# Patient Record
Sex: MALE | Race: BLACK OR AFRICAN AMERICAN | NOT HISPANIC OR LATINO | Employment: FULL TIME | URBAN - METROPOLITAN AREA
[De-identification: names, ages, dates, MRNs, and addresses within clinical notes are randomized per-mention and may not be internally consistent; named-entity substitution may affect disease eponyms.]

---

## 2017-04-11 ENCOUNTER — OFFICE VISIT (OUTPATIENT)
Dept: SLEEP MEDICINE | Facility: CLINIC | Age: 37
End: 2017-04-11
Payer: COMMERCIAL

## 2017-04-11 VITALS
HEART RATE: 71 BPM | WEIGHT: 315 LBS | SYSTOLIC BLOOD PRESSURE: 154 MMHG | HEIGHT: 69 IN | DIASTOLIC BLOOD PRESSURE: 94 MMHG | BODY MASS INDEX: 46.65 KG/M2

## 2017-04-11 DIAGNOSIS — G47.30 SLEEP APNEA, UNSPECIFIED TYPE: Primary | ICD-10-CM

## 2017-04-11 PROCEDURE — 99204 OFFICE O/P NEW MOD 45 MIN: CPT | Mod: S$GLB,,, | Performed by: PSYCHIATRY & NEUROLOGY

## 2017-04-11 PROCEDURE — 99999 PR PBB SHADOW E&M-NEW PATIENT-LVL III: CPT | Mod: PBBFAC,,, | Performed by: PSYCHIATRY & NEUROLOGY

## 2017-04-11 RX ORDER — OLMESARTAN MEDOXOMIL 40 MG/1
TABLET ORAL
COMMUNITY
Start: 2017-04-04 | End: 2017-10-09 | Stop reason: ALTCHOICE

## 2017-04-11 NOTE — MR AVS SNAPSHOT
"    [facility]  [address]  [facility] [address]  Phone: [phone]                  Nain Jain   2017 9:20 AM   Office Visit    Description:  Male : 1980   Provider:  Sandra Olivia MD   Department:  [facility]           Reason for Visit     Sleep Apnea     Snoring           Diagnoses this Visit        Comments    Sleep apnea, unspecified type    -  Primary            To Do List           Goals (5 Years of Data)     None      [facility]     [facility] Care [facility] -  Assistance  Unless otherwise directed by your provider, please contact [facility], our nurse care line that is available for  assistance.     Registered nurses in the [facility] provide: appointment scheduling, clinical advisement, health education, and other advisory services.  Call: [phone] (toll free)               Medications           Message regarding Medications     Verify the changes and/or additions to your medication regime listed below are the same as discussed with your clinician today.  If any of these changes or additions are incorrect, please notify your healthcare provider.             Verify that the below list of medications is an accurate representation of the medications you are currently taking.  If none reported, the list may be blank. If incorrect, please contact your healthcare provider. Carry this list with you in case of emergency.           Current Medications     olmesartan (BENICAR) 40 MG tablet            Clinical Reference Information           Your Vitals Were     BP Pulse Height Weight BMI    154/94 (BP Location: Left arm, Patient Position: Sitting, BP Method: Automatic) 71 5' 9" (1.753 m) 158.9 kg (350 lb 5 oz) 51.73 kg/m2      Blood Pressure          Most Recent Value    BP  (!)  154/94      Allergies as of 2017     No Known Allergies      Immunizations Administered on Date of Encounter - 2017  "    None      Orders Placed During Today's Visit     Future Labs/Procedures Expected by Expires    Home Sleep Studies  As directed 4/11/2018      [facility] Sign-Up     Activating your [facility] account is as easy as 1-2-3!     1) Visit [email or web address], select Sign Up Now, enter this activation code and your date of birth, then select Next.  [email or web address]  Expires: 5/26/2017 10:39 AM      2) Create a username and password to use when you visit [facility] in the future and select a security question in case you lose your password and select Next.    3) Enter your e-mail address and click Sign Up!    Additional Information  If you have questions, please e-mail [email or web address] or call [phone] to talk to our [facility] staff. Remember, [facility] is NOT to be used for urgent needs. For medical emergencies, dial 911.         [place]    [facility] ([facility]) will contact you to schedule HOME the sleep study. Their number is [phone] (ext 1). Please call them if you do not hear from them in 10 business days from now.  [facility] is located on 7th floor of the [facility].    SLEEP CLINIC (my assistant) will call you when the sleep study results are ready - if you have not heard from us by 2 weeks from the date of the study, please call [phone] (ext 2) or you can use [facility] to contact me.    You are advised to abstain from driving should you feel sleepy or drowsy.         Language Assistance Services     ATTENTION: Language assistance services are available, free of charge. Please call [phone].      ATENCIÓN: Si habla español, tiene a johansen disposición servicios gratuitos de asistencia lingüística. Llame al [phone].     CHÚ Ý: N?u b?n nói Ti?ng Vi?t, có các d?ch v? h? tr? ngôn ng? mi?n phí dành cho b?n. G?i s? [phone].         [facility] complies with applicable Federal civil rights laws and does not discriminate on the basis of race, color,  national origin, age, disability, or sex.

## 2017-04-11 NOTE — PROGRESS NOTES
Nain Jain  was seen at the request of  Self, Aaareferral for sleep evaluation.    04/11/2017:  INITIAL HISTORY OF PRESENT ILLNESS:  Nain Jain is a 36 y.o. male is here to be evaluated for a sleep disorder.       CHIEF COMPLAINT:      The patient's complaints include snoring and interrupted sleep. Denied fatigue, sleepiness, witnessed apneas.    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    Denies  dry mouth and sore throat  Denies nasal congestion   Denies  morning headaches  Denies  interrupted sleep  Denies frequent leg movements  Denies symptoms concerning for parasomnia    The ESS ([place] Sleepiness Score) taken on initial visit is 1 /24    The patient never had tonsillectomy, adenoidectomy or UPPP      SLEEP ROUTINE AND LIFESTYLE 04/11/2017 :    Occupation:    Bed partner:      Time to bed - wake up time on a workday : 1 AM to 8 AM  Time to bed - wake up time on a day off: 11pm to  8AM  Sleep onset latency: 30 min  Disruptions or awakenings: 1  Time to fall back into sleep: 10 min   Perceived sleep quality: 4/5  Perceived total sleep time:  7  hours.  Daytime naps: 0    Exercise routine: sometimes       PREVIOUS SLEEP STUDIES:   None      DME:       PAST MEDICAL HISTORY:    Active Ambulatory Problems     Diagnosis Date Noted    No Active Ambulatory Problems     Resolved Ambulatory Problems     Diagnosis Date Noted    No Resolved Ambulatory Problems     No Additional Past Medical History                PAST SURGICAL HISTORY:    No past surgical history on file.      FAMILY HISTORY:                No family history on file.    SOCIAL HISTORY:          Tobacco:   History   Smoking Status    Never Smoker   Smokeless Tobacco    Not on file       alcohol use:    History   Alcohol use Not on file                   ALLERGIES:  Review of patient's allergies indicates:  No Known Allergies    CURRENT MEDICATIONS:    Current Outpatient Prescriptions   Medication  "Sig Dispense Refill    olmesartan (BENICAR) 40 MG tablet        No current facility-administered medications for this visit.                       REVIEW OF SYSTEMS:   Sleep related symptoms as per HPI    denies weight gain  Denies dyspnea  Denies palpitations  Denies acid reflux   Reports polyuria  Denies  mood diturbance  Denies  anemia  Denies  muscle pain  Denies  Gait imbalance    Otherwise, a balance of 10 systems reviewed is negative.    PHYSICAL EXAM:  BP (!) 154/94 (BP Location: Left arm, Patient Position: Sitting, BP Method: Automatic)  Pulse 71  Ht 5' 9" (1.753 m)  Wt (!) 158.9 kg (350 lb 5 oz)  BMI 51.73 kg/m2  GENERAL: Normal development, well groomed.  HEENT:   HEENT:  Conjunctivae are non-erythematous; Pupils equal, round, and reactive to light; Nose is symmetrical; Nasal mucosa is pink and moist; Septum is midline; Inferior turbinates are hypertrophied; Nasal airflow is diminished on the right; Posterior pharynx is pink; Modified Mallampati:IV; Posterior palate is very low; Tonsils +2 - +3; Uvula is wide and elongated;Tongue is enlarged; Dentition is fair; No TMJ tenderness; Jaw opening and protrusion without click and without discomfort.  NECK: Supple. Neck circumference is 20 inches. No thyromegaly. No palpable nodes.     SKIN: On face and neck: No abrasions, no rashes, no lesions.  No subcutaneous nodules are palpable.  RESPIRATORY: Chest is clear to auscultation.  Normal chest expansion and non-labored breathing at rest.  CARDIOVASCULAR: Normal S1, S2.  No murmurs, gallops or rubs. No carotid bruits bilaterally.  No edema. No clubbing. No cyanosis.    NEURO: Oriented to time, place and person. Normal attention span and concentration. Gait normal.    PSYCH: Affect is full. Mood is normal  MUSCULOSKELETAL: Moves 4 extremities. Gait normal.         Using [facility]:      ASSESSMENT:    1. Sleep Apnea NEC. The patient symptomatically has  snoring and interrupted sleep  with exam findings of "a " crowded oral airway and elevated body mass index. The patient has medical co-morbidities of elevated blood pressure,  which can be worsened by WALTER. This warrants further investigation for possible obstructive sleep apnea. Scored 5/8 on STOP BANG scale.          PLAN:    Diagnostic: Polysomnogram HOME (BCBS).        More than 25 minutes of this 45 minutes visit was spent in counseling: during our discussion today, we talked about the etiology of  WALTER as well as the potential ramifications of untreated sleep apnea, which could include daytime sleepiness, hypertension, heart disease and/or stroke.  We discussed potential treatment options, which could include weight loss, body positioning, continuous positive airway pressure (CPAP), or referral for surgical consideration. Meanwhile, he  is urged to avoid supine sleep, weight gain and alcoholic beverages since all of these can worsen WALTER.     Precautions: The patient was advised to abstain from driving should he feel sleepy or drowsy.    Follow up: MD/NP  after the sleep study has been completed.     Thank you for allowing me the opportunity to participate in the care of your patient.    This visit summary will be sent to referring provider via inbasket

## 2017-04-11 NOTE — PATIENT INSTRUCTIONS
SLEEP LAB ([facility] or [facility]) will contact you to schedule HOME the sleep study. Their number is [phone] (ext 1). Please call them if you do not hear from them in 10 business days from now.  [facility] is located on 7th floor of the [facility].    SLEEP CLINIC (my assistant) will call you when the sleep study results are ready - if you have not heard from us by 2 weeks from the date of the study, please call [phone] (ext 2) or you can use [facility] to contact me.    You are advised to abstain from driving should you feel sleepy or drowsy.

## 2017-04-13 ENCOUNTER — TELEPHONE (OUTPATIENT)
Dept: SLEEP MEDICINE | Facility: OTHER | Age: 37
End: 2017-04-13

## 2017-04-18 ENCOUNTER — DOCUMENTATION ONLY (OUTPATIENT)
Dept: BARIATRICS | Facility: CLINIC | Age: 37
End: 2017-04-18

## 2017-04-18 NOTE — PROGRESS NOTES
Bariatric Surgery Online Course Form Submission  Someone has submitted a Bariatric Surgery Online Course Form Submission on this page.  Date: 2017-04-17 - 14:28  Patient's Name: Nain Jain  YOB: 1980 Email: [email or web address]  Phone: [phone]   Patient Address: [address] [place] [id number]  Preferred Surgical Location: [facility]   I certify that I am 18 years of age or older:   Confirmation Code: Inspire [id number]  Verification of Bariatric Seminar: y  Insurance Information  Insurance or Self Pay? Insurance - Fill out fields below  Insurance Company Name: Blue Cross Blue Shield   Type of Coverage/Coverage Plan (i.e. PPO, HMO): PPO   Group Name:   Subscriber Name: Nain Jain   Member Name (patient's name)   Member ID/Policy #:[id number]   Plan Effective Date: 01/24/2016  Card Issuance date:

## 2017-04-20 ENCOUNTER — HOSPITAL ENCOUNTER (OUTPATIENT)
Dept: CARDIOLOGY | Facility: CLINIC | Age: 37
Discharge: HOME | End: 2017-04-20
Attending: SURGERY
Payer: COMMERCIAL

## 2017-04-20 ENCOUNTER — DOCUMENTATION ONLY (OUTPATIENT)
Dept: BARIATRICS | Facility: CLINIC | Age: 37
End: 2017-04-20

## 2017-04-20 ENCOUNTER — HOSPITAL ENCOUNTER (OUTPATIENT)
Dept: RADIOLOGY | Facility: HOSPITAL | Age: 37
Discharge: HOME | End: 2017-04-20
Attending: SURGERY
Payer: COMMERCIAL

## 2017-04-20 ENCOUNTER — INITIAL CONSULT (OUTPATIENT)
Dept: BARIATRICS | Facility: CLINIC | Age: 37
End: 2017-04-20
Payer: COMMERCIAL

## 2017-04-20 VITALS
HEIGHT: 70 IN | DIASTOLIC BLOOD PRESSURE: 90 MMHG | BODY MASS INDEX: 45.1 KG/M2 | SYSTOLIC BLOOD PRESSURE: 140 MMHG | WEIGHT: 315 LBS | HEART RATE: 78 BPM

## 2017-04-20 DIAGNOSIS — E66.9 DIABETES MELLITUS TYPE 2 IN OBESE: ICD-10-CM

## 2017-04-20 DIAGNOSIS — E66.01 MORBID OBESITY WITH BMI OF 50.0-59.9, ADULT: ICD-10-CM

## 2017-04-20 DIAGNOSIS — R06.02 SOB (SHORTNESS OF BREATH): ICD-10-CM

## 2017-04-20 DIAGNOSIS — I10 ESSENTIAL HYPERTENSION: ICD-10-CM

## 2017-04-20 DIAGNOSIS — E78.5 HYPERLIPIDEMIA, UNSPECIFIED HYPERLIPIDEMIA TYPE: ICD-10-CM

## 2017-04-20 DIAGNOSIS — E11.69 DIABETES MELLITUS TYPE 2 IN OBESE: ICD-10-CM

## 2017-04-20 DIAGNOSIS — E66.01 MORBID OBESITY WITH BMI OF 50.0-59.9, ADULT: Primary | ICD-10-CM

## 2017-04-20 PROCEDURE — 3080F DIAST BP >= 90 MM HG: CPT | Mod: S$GLB,,, | Performed by: PHYSICIAN ASSISTANT

## 2017-04-20 PROCEDURE — 4010F ACE/ARB THERAPY RXD/TAKEN: CPT | Mod: S$GLB,,, | Performed by: PHYSICIAN ASSISTANT

## 2017-04-20 PROCEDURE — 99999 PR PBB SHADOW E&M-EST. PATIENT-LVL III: CPT | Mod: PBBFAC,,, | Performed by: PHYSICIAN ASSISTANT

## 2017-04-20 PROCEDURE — 3046F HEMOGLOBIN A1C LEVEL >9.0%: CPT | Mod: 8P,S$GLB,, | Performed by: PHYSICIAN ASSISTANT

## 2017-04-20 PROCEDURE — 1160F RVW MEDS BY RX/DR IN RCRD: CPT | Mod: S$GLB,,, | Performed by: PHYSICIAN ASSISTANT

## 2017-04-20 PROCEDURE — 99205 OFFICE O/P NEW HI 60 MIN: CPT | Mod: S$GLB,,, | Performed by: PHYSICIAN ASSISTANT

## 2017-04-20 PROCEDURE — 93000 ELECTROCARDIOGRAM COMPLETE: CPT | Mod: S$GLB,,, | Performed by: INTERNAL MEDICINE

## 2017-04-20 PROCEDURE — 71020 XR CHEST PA AND LATERAL: CPT | Mod: 26,,, | Performed by: RADIOLOGY

## 2017-04-20 PROCEDURE — 71020 XR CHEST PA AND LATERAL: CPT | Mod: TC

## 2017-04-20 PROCEDURE — 3077F SYST BP >= 140 MM HG: CPT | Mod: S$GLB,,, | Performed by: PHYSICIAN ASSISTANT

## 2017-04-20 RX ORDER — METFORMIN HYDROCHLORIDE EXTENDED-RELEASE TABLETS 500 MG/1
500 TABLET, FILM COATED, EXTENDED RELEASE ORAL
COMMUNITY
End: 2018-01-31 | Stop reason: SDUPTHER

## 2017-04-20 RX ORDER — ROSUVASTATIN CALCIUM 40 MG/1
10 TABLET, COATED ORAL NIGHTLY
COMMUNITY
End: 2018-01-31 | Stop reason: SDUPTHER

## 2017-04-20 NOTE — PROGRESS NOTES
BARIATRIC NEW PATIENT EVALUATION    CHIEF COMPLAINT:   Morbid obesity, Body mass index is 50.36 kg/(m^2). and inability to lose weight.    HPI:  Nain Jani is a 36 y.o. male presenting for morbid obesity, Body mass index is 50.36 kg/(m^2). and inability to lose weight. The patient has tried diet modification and juicing.  The most weight lost was 10 lbs with diet modification.  He has been overweight since his late 20's after a divorce.  His highest weight is his current weight, 350 lbs.  He has already cut out fast food and beer.  He is not able to be as active as he wants.  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  He eats very large portionS.  He wants a Sleeve Gastrectomy with Dr. Massey.    PAST MEDICAL HISTORY:  Past Medical History:   Diagnosis Date    Diabetes mellitus     Hyperlipidemia     Hypertension          PAST SURGICAL HISTORY:  History reviewed. No pertinent surgical history.    FAMILY HISTORY:  Family History   Problem Relation Age of Onset    Diabetes Mother     Obesity Mother     Diabetes Maternal Grandmother           SOCIAL HISTORY:   reports that he has never smoked. He has never used smokeless tobacco. He reports that he drinks about 1.2 oz of alcohol per week  He reports that he does not use illicit drugs.  He works full time as a  for [employer].  He is  with 6 kids and 1 grandchild.      MEDICATIONS:  Medications have been reviewed.    ALLERGIES:  Allergies have been reviewed.    Review of Systems   Constitutional: Negative for chills, fever and malaise/fatigue.   Eyes: Negative for blurred vision and double vision.   Respiratory: Positive for shortness of breath (every once in a while with extra exertion, can do 3 flights of stairs without sob). Negative for cough and hemoptysis.    Cardiovascular: Positive for leg swelling. Negative for chest pain and palpitations.   Gastrointestinal: Positive for blood in stool  (hemrrhoids, constipation). Negative for abdominal pain, constipation, diarrhea, heartburn, melena, nausea and vomiting.   Genitourinary: Negative for dysuria and hematuria.   Musculoskeletal: Negative for back pain, falls, joint pain, myalgias and neck pain.   Skin: Negative for rash.   Neurological: Negative for dizziness, tingling, weakness and headaches.   Endo/Heme/Allergies: Negative for environmental allergies. Does not bruise/bleed easily.   Psychiatric/Behavioral: Negative for depression, hallucinations, memory loss, substance abuse and suicidal ideas. The patient is not nervous/anxious and does not have insomnia.        Vitals:    04/20/17 1352   BP: (!) 140/90   Pulse: 78       Physical Exam   Constitutional: He is oriented to person, place, and time and well-developed, well-nourished, and in no distress.   Morbidly obese   HENT:   Head: Normocephalic and atraumatic.   Eyes: No scleral icterus.   Cardiovascular: Normal rate, regular rhythm, normal heart sounds and intact distal pulses.  Exam reveals no gallop and no friction rub.    No murmur heard.  Pulmonary/Chest: Effort normal and breath sounds normal. No respiratory distress. He has no wheezes. He has no rales. He exhibits no tenderness.   Abdominal: Soft. Bowel sounds are normal. He exhibits no distension and no mass. There is no tenderness. There is no rebound and no guarding.   Musculoskeletal: He exhibits no edema.   Neurological: He is alert and oriented to person, place, and time.   Skin: Skin is warm and dry. No rash noted. No erythema. No pallor.   Psychiatric: Mood, memory, affect and judgment normal.   Nursing note and vitals reviewed.      DIAGNOSIS:  1. Morbid Obesity, Body mass index is 50.36 kg/(m^2). and inability to lose weight.  2. Co-morbidities: diabetes mellitus, dyslipidemia, hypertension and obstructive sleep apnea    PLAN:  The patient is a good candidate for Bariatric Surgery. The patient is interested in sleeve gastrectomy.  The surgery and post-op care was discussed in detail with the patient. All questions were answered.    The patient understands that bariatric surgery is a tool to aid in weight loss and that they need to be committed to the diet and exercise post-operatively for successful weight loss. Discussed with patient that bariatric surgery is not the easy way out and that it will take plenty of dedication on the patient's part to be successful. Also discussed the possibility of weight regain if the patient strays from the diet guidelines or exercise requirements. Patient verbalized understanding and wishes to proceed with the work-up.    Estimated Goal weight is 256 lbs, approximately 50% of their excess weight.      **I certify that I have personally reviewed the patient's blue intake packet with the patient.  All information has been added into epic.        ORDERS:  1. Sleep Study, Chest X-Ray, EKG and Stress Echo  2. Psychological Consult, Bariatric Dietician Consult, PCP Clearance and Sleep Medicine Consult  3. Bariatric Labs: BMP, CBC, Folate Serum, H. pylori, HgA1C, Hepatic Panel/LFT, Iron & TIBC, Lipid Profile, Magnesium, Phosphate, T3, T4, TSH, Free T4, Vitamin B12, and Vitamin B1.    Referring Physician: Primary Doctor No  RTC: As scheduled.

## 2017-04-20 NOTE — PATIENT INSTRUCTIONS
The following tests will be required for your Bariatric Surgery work-up.  There may be additional tests added.      - Lab work  - Chest X-ray  - EKG  - Cardiac Stress Test  - Nutrition Consult and Clearance.  3 months with Nutrition per insurance.    - 2015 & 2016 DOT Physical to give to insurance.  - Psychological Consult Online ([email or web address])  - Primary Care Doctor Clearance (This needs to be done within 30 days of surgery date)      In preparation for bariatric surgery, please complete the following:   · Discuss your current medications with your primary care provider, remember your medications will need to be crushed, chewable, or in liquid form for the first 3 months after a Sleeve.    · If you take a blood thinner such as: Coumadin (warfarin), Pradaxa (dabigatran), or Plavix (clopidogrel), you will need to speak with your prescribing provider on how or if this medication can be stopped before surgery.   · If you take a medication for depression or anxiety, you will need to begin crushing or opening the capsule 1-3 months prior to surgery.  Remember to discuss this with the psychologist or psychiatrist that you see.   · If you take medication for arthritis on a daily basis that is considered a non-steroidal anti-inflammatory (NSAID), please discuss with your prescribing physician an alternative medication.  After having gastric bypass or gastric sleeve, this group of medications is not appropriate to take due to increased risk of bleeding stomach ulcers.      NO SHOW POLICY      DEFINITIONS  Appointments: Pre-scheduled meetings or consultations with any physician, advanced practice provider, dietitian, or psychologist, and labs, imaging studies, sleep studies, etc.   Late cancellation: Cancelling an appointment 24-48 hours prior to scheduled time.  No-Show appointment:  is when    You do NOT arrive to your appointment at the time its scheduled.   You call to cancel or cancel via  [facility] less than 24 hours in advance of your scheduled appointment   You show up 15 minutes AFTER your scheduled appointment time without any notification of being late.     POLICY  1. You are allowed up to 3 cancellations for appointments.    After 3 cancellations your case will be placed on hold for 2 months and after that time you can resume the program.   2. You are allowed only 1 no-show for an appointment.    You will be re-scheduled one time and if there is a 2nd no-show at any point, your case will be placed on hold for 3 months.  After 3 months you can resume the program.     3. Upon resuming the program after being placed on hold for either above mentioned reasons, if you have a late cancel or no show for any appointment, the bariatric team will review if youre an appropriate candidate for surgery at the monthly meeting.

## 2017-04-20 NOTE — MR AVS SNAPSHOT
[facility] - Bariatric Surgery  [address]  [facility] [address]  Phone: [phone]  Fax: [phone]                  Nain Jain   2017 2:20 PM   Initial consult    Description:  Male : 1980   Provider:  Yessica Dejesus PA-C   Department:  [facility] - Bariatric Surgery           Reason for Visit     Consult           Diagnoses this Visit        Comments    Morbid obesity with BMI of 50.0-59.9, adult    -  Primary     Essential hypertension         Diabetes mellitus type 2 in obese         Hyperlipidemia, unspecified hyperlipidemia type         SOB (shortness of breath)                To Do List           Future Appointments        Provider Department Dept Phone    2017 5:30 PM ONLINE BARIATRIC SEMINAR [facility] - Bariatric Surgery [phone]    2017 1:00 PM HOME STUDY, SLEEP [facility] [place], operated [facility] [phone]      Goals (5 Years of Data)     None      [facility]     [facility] Nurse Care Line -  Assistance  Unless otherwise directed by your provider, please contact [facility], our nurse care line that is available for  assistance.     Registered nurses in the [facility] Center provide: appointment scheduling, clinical advisement, health education, and other advisory services.  Call: [phone] (toll free)               Medications           Message regarding Medications     Verify the changes and/or additions to your medication regime listed below are the same as discussed with your clinician today.  If any of these changes or additions are incorrect, please notify your healthcare provider.             Verify that the below list of medications is an accurate representation of the medications you are currently taking.  If none reported, the list may be blank. If incorrect, please contact your healthcare provider. Carry this list with you in case of emergency.           Current Medications     metformin (FORTAMET) 500 mg 24 hr  "tablet Take 500 mg by mouth daily with breakfast.    olmesartan (BENICAR) 40 MG tablet     rosuvastatin (CRESTOR) 40 MG Tab Take 10 mg by mouth every evening.           Clinical Reference Information           Your Vitals Were     BP Pulse Height Weight BMI    140/90 78 5' 10" (1.778 m) 159.2 kg (350 lb 15.6 oz) 50.36 kg/m2      Blood Pressure          Most Recent Value    BP  (!)  140/90      Allergies as of 4/20/2017     No Known Allergies      Immunizations Administered on Date of Encounter - 4/20/2017     None      Orders Placed During Today's Visit     Future Labs/Procedures Expected by Expires    BMP  4/20/2017 6/19/2018    CBC w/ Auto Differential  4/20/2017 6/19/2018    CXR  4/20/2017 4/20/2018    Folate Serum  4/20/2017 6/19/2018    Free T4  4/20/2017 6/19/2018    H. Pylori Antibody, IGG  4/20/2017 6/19/2018    Hepatic Panel  4/20/2017 6/19/2018    Hg A1c  4/20/2017 6/19/2018    Iron & TIBC  4/20/2017 6/19/2018    Lipid Profile  4/20/2017 6/19/2018    Magnesium  4/20/2017 6/19/2018    Phosphorus  4/20/2017 6/19/2018    T3  4/20/2017 6/19/2018    T4  4/20/2017 6/19/2018    TSH  4/20/2017 6/19/2018    Vitamin B12  4/20/2017 6/19/2018    Vitamin B1  4/20/2017 6/19/2018    Vitamin D 25 Hydroxy  4/20/2017 6/19/2018    EKG  As directed 4/20/2018    Pharmacological stress echo  As directed 4/20/2018      [facility] Sign-Up     Activating your [facility] account is as easy as 1-2-3!     1) Visit [email or web address], select Sign Up Now, enter this activation code and your date of birth, then select Next.  [email or web address]  Expires: 5/26/2017 10:39 AM      2) Create a username and password to use when you visit [facility] in the future and select a security question in case you lose your password and select Next.    3) Enter your e-mail address and click Sign Up!    Additional Information  If you have questions, please e-mail [email or web address] or call [phone] to talk to our [facility] staff. Remember, [facility] is " NOT to be used for urgent needs. For medical emergencies, dial 911.         Instructions    The following tests will be required for your Bariatric Surgery work-up.  There may be additional tests added.      - Lab work  - Chest X-ray  - EKG  - Cardiac Stress Test  - Nutrition Consult and Clearance.  3 months with Nutrition per insurance.    - 2015 & 2016 DOT Physical to give to insurance.  - Psychological Consult Online ([email or web address])  - Primary Care Doctor Clearance (This needs to be done within 30 days of surgery date)      In preparation for bariatric surgery, please complete the following:   · Discuss your current medications with your primary care provider, remember your medications will need to be crushed, chewable, or in liquid form for the first 3 months after a Sleeve.    · If you take a blood thinner such as: Coumadin (warfarin), Pradaxa (dabigatran), or Plavix (clopidogrel), you will need to speak with your prescribing provider on how or if this medication can be stopped before surgery.   · If you take a medication for depression or anxiety, you will need to begin crushing or opening the capsule 1-3 months prior to surgery.  Remember to discuss this with the psychologist or psychiatrist that you see.   · If you take medication for arthritis on a daily basis that is considered a non-steroidal anti-inflammatory (NSAID), please discuss with your prescribing physician an alternative medication.  After having gastric bypass or gastric sleeve, this group of medications is not appropriate to take due to increased risk of bleeding stomach ulcers.      NO SHOW POLICY      DEFINITIONS  Appointments: Pre-scheduled meetings or consultations with any physician, advanced practice provider, dietitian, or psychologist, and labs, imaging studies, sleep studies, etc.   Late cancellation: Cancelling an appointment 24-48 hours prior to scheduled time.  No-Show appointment:  is when    You do NOT arrive  to your appointment at the time its scheduled.   You call to cancel or cancel via [facility] less than 24 hours in advance of your scheduled appointment   You show up 15 minutes AFTER your scheduled appointment time without any notification of being late.     POLICY  1. You are allowed up to 3 cancellations for appointments.    After 3 cancellations your case will be placed on hold for 2 months and after that time you can resume the program.   2. You are allowed only 1 no-show for an appointment.    You will be re-scheduled one time and if there is a 2nd no-show at any point, your case will be placed on hold for 3 months.  After 3 months you can resume the program.     3. Upon resuming the program after being placed on hold for either above mentioned reasons, if you have a late cancel or no show for any appointment, the bariatric team will review if youre an appropriate candidate for surgery at the monthly meeting.           Language Assistance Services     ATTENTION: Language assistance services are available, free of charge. Please call [phone].      ATENCIÓN: Si habla carlieañol, tiene a johansen disposición servicios gratuitos de asistencia lingüística. Llame al [phone].     CHÚ Ý: N?u b?n nói Ti?ng Vi?t, có các d?ch v? h? tr? ngôn ng? mi?n phí dành cho b?n. G?i s? [phone].         [facility] complies with applicable Federal civil rights laws and does not discriminate on the basis of race, color, national origin, age, disability, or sex.

## 2017-04-21 ENCOUNTER — TELEPHONE (OUTPATIENT)
Dept: BARIATRICS | Facility: CLINIC | Age: 37
End: 2017-04-21

## 2017-04-21 DIAGNOSIS — E66.01 MORBID OBESITY WITH BMI OF 50.0-59.9, ADULT: Primary | ICD-10-CM

## 2017-04-21 NOTE — TELEPHONE ENCOUNTER
----- Message from Yi Ross sent at 4/21/2017  9:26 AM CDT -----  Contact: nancy ext [phone]  Nancy States that they need a call returned by a nurse in reference to a referral being put in for a evaluation . Please call Nancy @ ext [phone]    . Thanks :)

## 2017-05-03 ENCOUNTER — TELEPHONE (OUTPATIENT)
Dept: INTERNAL MEDICINE | Facility: CLINIC | Age: 37
End: 2017-05-03

## 2017-05-03 ENCOUNTER — PATIENT OUTREACH (OUTPATIENT)
Dept: ADMINISTRATIVE | Facility: HOSPITAL | Age: 37
End: 2017-05-03

## 2017-05-03 NOTE — TELEPHONE ENCOUNTER
Attempted to contact patient to clarify if patient is calling about his lab appt on 05/10/2017 because patient is already scheduled for a fasting lab. Left message for patient to return call.

## 2017-05-03 NOTE — TELEPHONE ENCOUNTER
----- Message from Lakia Davenport sent at 5/3/2017  4:12 PM CDT -----  Contact: pt  _  1st Request  _  2nd Request  _  3rd Request        Who: pt    Why: pt received email regarding needing some labs but no orders yet in the system. Please call the pt regarding labs    What Number to Call Back:[phone]    When to Expect a call back: (Before the end of the day)   -- if the call is after 12:00, the call back will be tomorrow.

## 2017-05-09 ENCOUNTER — TELEPHONE (OUTPATIENT)
Dept: BARIATRICS | Facility: CLINIC | Age: 37
End: 2017-05-09

## 2017-05-09 ENCOUNTER — OFFICE VISIT (OUTPATIENT)
Dept: PSYCHIATRY | Facility: CLINIC | Age: 37
End: 2017-05-09
Payer: COMMERCIAL

## 2017-05-09 DIAGNOSIS — Z01.818 PREOP EXAMINATION: Primary | ICD-10-CM

## 2017-05-09 DIAGNOSIS — R06.02 SOB (SHORTNESS OF BREATH): ICD-10-CM

## 2017-05-09 DIAGNOSIS — I10 ESSENTIAL HYPERTENSION: ICD-10-CM

## 2017-05-09 DIAGNOSIS — E66.9 DIABETES MELLITUS TYPE 2 IN OBESE: ICD-10-CM

## 2017-05-09 DIAGNOSIS — E66.01 MORBID OBESITY DUE TO EXCESS CALORIES: ICD-10-CM

## 2017-05-09 DIAGNOSIS — E11.69 DIABETES MELLITUS TYPE 2 IN OBESE: ICD-10-CM

## 2017-05-09 PROCEDURE — 96102 PR PSYCHOLOGIC TESTING BY TECHNICIAN: CPT | Mod: 59,S$GLB,, | Performed by: PSYCHOLOGIST

## 2017-05-09 PROCEDURE — 90791 PSYCH DIAGNOSTIC EVALUATION: CPT | Mod: S$GLB,,, | Performed by: PSYCHOLOGIST

## 2017-05-09 PROCEDURE — 96101 PR PSYCHOLOGIC TESTING BY PSYCH/PHYS: CPT | Mod: S$GLB,,, | Performed by: PSYCHOLOGIST

## 2017-05-09 NOTE — Clinical Note
Psychological Testing and Psychiatry Initial Visit reports are now available in the Notes tab of Chart Review in Epic.  These reports are confidential.    There are no overt psychological contraindications for bariatric surgery.  He has limited awareness of dietary and behavioral changes, and meets with Tyler tomorrow.  He currently drinks 2 bottles of wine per week, but did not know that he should cut back for surgery and seemed very willing to do so.  Please do not hesitate to contact me with any questions or concerns.   Best Regards,  Enma Ellis, Ph.D. Clinical Psychologist

## 2017-05-09 NOTE — PSYCH TESTING
[facility] [address] [place]  [address]  [phone]    REPORT OF PSYCHOLOGICAL TESTING    NAME:  Nain Jain  OC #:  [id number]  :  1980    REFERRED BY:  Todd Massey Jr., M.D.    EVALUATED BY:  Enma Ellis, Ph.D., Clinical Psychologist  Philippe Hayes M.S., Psychometrician    DATES OF EVALUATION:  2017, 2017    EVALUATION PROCEDURES AND TIMES:  Conducted by Psychologist:  Interpretation and report of test data  Integration of information from diagnostic interview, medical record, and testing data  Conducted by Technician:  [place] Multiphasic Personality Inventory - 2 Restructured Form (MMPI-2RF)  [facility] Behavioral Medicine Diagnostic (MBMD)  CPT Codes and Time:  25157 - 2 hours; 54767 - 1 hour    EVALUATION FINDINGS:  Before this evaluation was initiated, the purposes and process of the assessment and the limits of confidentiality were discussed with the patient who expressed understanding of these issues and orally consented to proceed with the evaluation.    Mr. Nain Jain is a 36-year-old Black  male who is pursuing bariatric surgery to improve his health and quality of life.  Mr. Jain has struggled with weight since ten years ago after his first wife started cheating on him.  Factors that have contributed to his weight gain over the years include:  emotional eating, unhealthy food, and dining out.  In addition, Mr. Jain acknowledges that he is an emotional eater, with stress as his primary emotional trigger to overeat.  He is working on increasing his coping mechanisms for stress, including playing with his remote control car (3-4x/week).  He has tried many weight loss methods on his own (i.e., juicing and diet modification) with some success (10 lb. weight loss), and believes that his biggest weight loss challenge is lack of exercise.  He notes that it gets expensive to continuously purchase produce.  His motivation for  seeking surgery now is to improve self-image and health.  His postsurgical goals include shopping at regular stores, decreasing medical problems, and improving work performance.    Mr. Jain denied past psychiatric history and treatment.  He denied any history of suicidality or non-suicidal self-injury.  He did not report current psychiatric problems, adjustment issues, or eating disorder symptoms.    At his initial consultation with Yessica Dejesus PA-C, on 04/20/2017, his BMI was 50.36. His medical comorbidities include: Diabetes mellitus, dyslipidemia, hypertension and obstructive sleep apnea.  Mr. Jain has an upcoming appointment with bariatric nutritionist Tyler Alejandre RD, on 05/10/17.  He reports that he has not yet made changes to his diet since beginning the program.  He must continue meeting with Ms. Pjin until he is able to demonstrate the implementation of lifestyle changes prior to clearance for bariatric surgery.  He has a good understanding regarding the risks and benefits of the procedure and appears motivated for change.  He was fully cooperative and engaged in the assessment process.     Mr. Jain produced an interpretable MMPI-2-RF profile. Of note, validity scale results suggest Mr. Jain tended to portray himself in an extremely positive light by denying many minor faults and shortcomings that most people acknowledge, which may indicate an underestimation of problems assessed by this test.  This reported level of psychological adjustment is relatively rare in the general population.  Still, this type of responding is not an uncommon style for candidates to assume given the demands of the testing situation.  His profile will be interpreted despite this caution, and any absence of elevation on the scales is considered uninterpretable.  He does not report any somatic, cognitive, emotional, thought, or behavioral dysfunction.  He describes others as well-intentioned and trustworthy and  disavows cynical beliefs about them.  He is possibly overly trusting.    The MBMD indicated that Mr. Jain is not reporting any significant psychiatric distress at this time. He typically demonstrates conformity to medical regimens and treatment. He may deny disturbing feelings and unpleasant responsibilities.  He will likely be easy and cooperative during treatment, but there may be issues when directions and guidance are not explicit and forthcoming.  Still, testing suggests that he is quite capable of handling the psychological discomfort of surgery.  He reports good functioning despite pain and strong support from family and friends.  According to test data, psychological factors are unlikely to contribute to excessive medical complications for Mr. Jain.  His responses suggest that, compared to other patients seeking bariatric surgery, he is likely to experience an improved quality of life and functioning after surgery, and he is likely to follow through on making behavioral changes that will lead to optimal surgery results.    DIAGNOSTIC IMPRESSIONS:    ICD-10-CM ICD-9-CM   1. Preop examination Z01.818 V72.84   2. Morbid obesity due to excess calories E66.01 278.01   3. Diabetes mellitus type 2 in obese E11.9 250.00    E66.9 278.00   4. Essential hypertension I10 401.9   5. SOB (shortness of breath) R06.02 786.05   6. BMI 50.0-59.9, adult Z68.43 V85.43       SUMMARY AND RECOMMENDATIONS:  Mr. Jain has a long history of weight problems and is pursuing bariatric surgery at this time in an effort to improve his health and quality of life. Test results should be considered valid, and indicate that he reports no current psychiatric symptoms or adjustment problems. He reports good social support, demonstrates several characteristics that make him a good candidate for surgery, and testing suggests that he will benefit in multiple ways from bariatric surgery.  Mr. Jain has an upcoming appointment with Tyler  CRYSTAL Alejandre, on 05/10/17.  Due to his lack of lifestyle changes and limited nutritional awareness, it is recommended that he demonstrate commitment to the necessary behavioral changes with Ms. Pjin prior to clearance for surgery.  Given this condition has been met, test results show NO overt psychological contraindications for surgery.
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
## 2017-05-10 ENCOUNTER — CLINICAL SUPPORT (OUTPATIENT)
Dept: BARIATRICS | Facility: CLINIC | Age: 37
End: 2017-05-10
Payer: COMMERCIAL

## 2017-05-10 ENCOUNTER — DOCUMENTATION ONLY (OUTPATIENT)
Dept: BARIATRICS | Facility: CLINIC | Age: 37
End: 2017-05-10

## 2017-05-10 ENCOUNTER — HOSPITAL ENCOUNTER (OUTPATIENT)
Dept: CARDIOLOGY | Facility: CLINIC | Age: 37
Discharge: HOME | End: 2017-05-10
Payer: COMMERCIAL

## 2017-05-10 ENCOUNTER — DOCUMENTATION ONLY (OUTPATIENT)
Dept: CARDIOLOGY | Facility: CLINIC | Age: 37
End: 2017-05-10

## 2017-05-10 VITALS — BODY MASS INDEX: 49.35 KG/M2 | WEIGHT: 315 LBS

## 2017-05-10 DIAGNOSIS — E66.01 MORBID OBESITY WITH BMI OF 45.0-49.9, ADULT: ICD-10-CM

## 2017-05-10 DIAGNOSIS — I10 ESSENTIAL HYPERTENSION: ICD-10-CM

## 2017-05-10 DIAGNOSIS — E66.9 DIABETES MELLITUS TYPE 2 IN OBESE: ICD-10-CM

## 2017-05-10 DIAGNOSIS — Z71.3 DIETARY COUNSELING AND SURVEILLANCE: ICD-10-CM

## 2017-05-10 DIAGNOSIS — E11.69 DIABETES MELLITUS TYPE 2 IN OBESE: ICD-10-CM

## 2017-05-10 DIAGNOSIS — R06.02 SOB (SHORTNESS OF BREATH): ICD-10-CM

## 2017-05-10 DIAGNOSIS — E66.01 MORBID OBESITY DUE TO EXCESS CALORIES: ICD-10-CM

## 2017-05-10 LAB
DIASTOLIC DYSFUNCTION: NO
MITRAL VALVE MOBILITY: NORMAL
RETIRED EF AND QEF - SEE NOTES: 60 (ref 55–65)

## 2017-05-10 PROCEDURE — 97803 MED NUTRITION INDIV SUBSEQ: CPT | Mod: S$GLB,,, | Performed by: DIETITIAN, REGISTERED

## 2017-05-10 PROCEDURE — 93351 STRESS TTE COMPLETE: CPT | Mod: S$GLB,,, | Performed by: INTERNAL MEDICINE

## 2017-05-10 PROCEDURE — 99999 PR PBB SHADOW E&M-EST. PATIENT-LVL II: CPT | Mod: PBBFAC,,, | Performed by: DIETITIAN, REGISTERED

## 2017-05-10 PROCEDURE — 93352 ADMIN ECG CONTRAST AGENT: CPT | Mod: S$GLB,,, | Performed by: INTERNAL MEDICINE

## 2017-05-10 PROCEDURE — 99499 UNLISTED E&M SERVICE: CPT | Mod: S$GLB,,, | Performed by: DIETITIAN, REGISTERED

## 2017-05-10 PROCEDURE — 93321 DOPPLER ECHO F-UP/LMTD STD: CPT | Mod: S$GLB,,, | Performed by: INTERNAL MEDICINE

## 2017-05-10 NOTE — MR AVS SNAPSHOT
[facility]  [address]  [facility] [address]  Phone: [phone]  Fax: [phone]                  Nain Jain   5/10/2017 9:00 AM   Clinical Support    Description:  Male : 1980   Provider:  Tyler Alejandre RD   Department:  [facility]           Diagnoses this Visit        Comments    Diabetes mellitus type 2 in obese         Essential hypertension         Dietary counseling and surveillance         Morbid obesity due to excess calories         Morbid obesity with BMI of 45.0-49.9, adult                To Do List           Future Appointments        Provider Department Dept Phone    5/15/2017 8:00 AM Obed Gallardo MD [facility] [phone]    2017 1:00 PM HOME STUDY, SLEEP [facility] [phone]    2017 9:00 AM Tyler Alejandre RD [facility] [phone]      Goals (5 Years of Data)     None      Follow-Up and Disposition     Return in about 4 weeks (around 2017).      [facility]     [facility] -  Assistance  Unless otherwise directed by your provider, please contact [facility], our nurse care line that is available for  assistance.     Registered nurses in the [facility] provide: appointment scheduling, clinical advisement, health education, and other advisory services.  Call: [phone] (toll free)               Medications           Message regarding Medications     Verify the changes and/or additions to your medication regime listed below are the same as discussed with your clinician today.  If any of these changes or additions are incorrect, please notify your healthcare provider.             Verify that the below list of medications is an accurate representation of the medications you are currently taking.  If none reported, the list may be blank. If incorrect, please contact your healthcare provider. Carry this list  with you in case of emergency.           Current Medications     metformin (FORTAMET) 500 mg 24 hr tablet Take 500 mg by mouth daily with breakfast.    olmesartan (BENICAR) 40 MG tablet     rosuvastatin (CRESTOR) 40 MG Tab Take 10 mg by mouth every evening.           Clinical Reference Information           Your Vitals Were     Weight BMI             156 kg (343 lb 14.7 oz) 49.35 kg/m2         Allergies as of 5/10/2017     No Known Allergies      Immunizations Administered on Date of Encounter - 5/10/2017     None      Instructions    1200- 1500 calorie Sample meal plan   80-120g protein per day.   Protein drinks and bars: 0-4 grams sugar   Drink lots of water throughout the day and exercise!   MENU # 1   Breakfast: 2 eggs, 1 turkey sausage abigail, 1 apple   Snack: P3 protein pack  Lunch: 2 roll-ups (sliced turkey, low-fat sliced cheese, mustard), 12 baby carrots dipped in 1 Tbsp natural peanut butter   Mid-Day Snack: ¼ cup unsalted almonds, ½ cup fruit   Dinner: 1 chicken thigh simmered in tomato sauce + 2 Tbsp mozzarella cheese, ½ cup black beans, 1/2 cup steamed carrots   Evening Snack: 1/2 cup grapes with 4 cubes low-fat cheddar cheese   ___________________________________________________   MENU # 2   Breakfast: 200 calorie protein drink   Mid-morning snack : ¼ cup unsalted nuts, medium banana   Lunch: 3oz tuna or chicken salad made with 2 tbsp light guardado, over salad with tomatoes and cucumbers.   Snack: low-fat cheese stick   Dinner: 3oz hamburger abigail, slice of low-fat cheese, 1 cup yellow squash and zucchini sauteed in nonstick cookspray  Snack: 6oz light yogurt   _______________________________________________________   Menu #3   Breakfast: 6oz plain Greek yogurt + fruit (½ banana OR ½ cup fruit - pineapple, blueberries, strawberries, peach), may add Splenda to tacho.   Lunch: Grilled chicken breast w/ slice low-fat pepper alyce cheese, 1/2 cup grilled/baked asparagus and small salad with Salad Spritzer.    Mid-Day snack: 200 calorie protein drink   Dinner: 4oz Grilled fish, ½ cup white beans, ½ cup cooked spinach   Evening Snack: fudgsicle no-sugar-added   Menu # 4   Breakfast: 1 scoop vanilla protein powder + 4oz skim milk + 4oz coffee   Snack: Pure protein bar   Lunch: 2 Lettuce tacos: 3oz seasoned ground turkey wrapped in a Minesh lettuce leaves with 1 Tbsp shredded cheese and dollop low-fat sour cream   Snack: ½ cup cottage cheese, ½ cup pineapple chunks   Dinner: Shrimp omelet: 2 eggs, ½ cup shrimp, green onions, and shredded cheese   ______________________________________________________   Menu #5   Breakfast: 1 cup low-fat cottage cheese, ½ cup peaches (no sugar added)   Snack: 1 apple, 4 cubes cheese   Lunch: 3oz baked pork chop, 1 cup okra and tomato stew   Snack: 1/2 cup black beans + salsa + dollop light sour cream   Dinner: Caprese chicken salad: 3 oz chicken breast, 1oz fresh mozzarella, sliced tomato, 1 Tbsp olive oil, basil   Snack: sugar-free popsicle   _______________________________________________________  Menu #6   Breakfast: ½ cup part-skim ricotta cheese, 2 Tbsp sugar-free strawberry preserves, sprinkle of slivered almonds   Snack: 1 orange + string cheese  Lunch: 3 oz canned chicken, 1oz shredded cheddar cheese, ½ cup black beans, 2 Tbsp salsa   Snack: 200 calorie Protein drink   Dinner: 4 oz grilled salmon steak, over mixed green salad with 2 tbsp light dressing   _______________________________________________________  Menu #7  Breakfast: crust-less quiche (bake 1 egg mixed w/ low fat cheese, broccoli and low sodium ham in a muffin cup until cooked inside)  Snack: 1 light yogurt  Lunch: protein shake (1 scoop powder + 8 oz skim milk, blended w/ ice)  Snack: small apple w/ 2 tbsp PB2 (powdered peanut butter)  Dinner: 2 Turkey meatballs w/ low sugar marinara sauce, 1/2 cup spiralized zucchini (sauteed on stove top w/ nonstick cookspray)      Meal Ideas for Regular Bariatric Diet  *Recipes and  products available at [email or web address].[email or web address]      Breakfast: (15-20g protein)    - Egg white omelet: 2 egg whites or ½ cup Egg Beaters. (Optional proteins: cheese, shrimp, black beans, chicken, sliced turkey) (Optional veggies: tomatoes, salsa, spinach, mushrooms, onions, green peppers, or small slice avocado)     - Egg and sausage: 1 egg or ¼ cup Egg Beaters (any variety), with 1 abigail or 2 links of Turkey sausage or Veggie breakfast sausage (Kira Talent or CloudWalk)    - Crust-less breakfast quiche: To make a glass pie dish, mix 4oz part skim Ricotta, 1 cup skim milk, and 2 eggs as your base. Add protein: shredded cheese, sliced lean ham or turkey, turkey woods/sausage. Add veggies: tomato, onion, green onion, mushroom, green pepper, spinach, etc.    - Yogurt parfait: Mix 1 - 6oz container Dannon Light N Fit vanilla yogurt, with ¼ cup crushed unsalted nuts    - Cottage cheese and fruit: ½ cup part-skim cottage cheese or ricotta cheese topped with fresh fruit or sugar free preserves     - Dinora Pedraza's Vanilla Egg custard* (add 2 Tbsp instant coffee granules to make Cappuccino Custard*)    - Hi-Protein café latte (skim milk, decaf coffee, 1 scoop protein powder). Optional to add Sugar free syrup or extract flavoring.    - Breakfast Lox: spread fat free cream cheese on slices of smoked salmon. Serve over scrambled or egg over easy (sauteed with nonstick cookspray) OR on a cucumber slice    - Eggwhich: Scramble or cook 1 large egg over easy using nonstick cookspray. Place between 2 slices of [place] woods and low fat cheese.     Lunch: (20-30g protein)    - ½ cup Black bean soup (Homemade or Progresso), with ¼ cup shredded low-fat cheese. Top with chopped tomato or fresh salsa.     - Lean deli turkey breast and low-fat sliced cheese, mustard or light guardado to moisten, rolled up together, or wrapped in a Minesh lettuce leaf    - Chicken salad made from dinner leftovers, moisten with low-fat salad dressing or  light guardado. Also try leftover salmon, shrimp, tuna or boiled eggs. Serve ½ cup over dark green salad    - Fat-free canned refried beans, topped with ¼ cup shredded low-fat cheese. Top with chopped tomato or fresh salsa.     - Greek salad: Top mixed greens with 1-2oz grilled chicken, tomatoes, red onions, 2-3 kalamata olives, and sprinkle lightly with feta cheese. Spritz with Balsamic vinegar to taste.     - Crust-less lunch quiche: To make a glass pie dish, mix 4oz part skim Ricotta, 1 cup skim milk, and 2 eggs as your base. Add protein: shredded cheese, sliced lean ham or turkey, shrimp, chicken. Add veggies: tomato, onion, green onion, mushroom, green pepper, spinach, artichoke, broccoli, etc.    - Pizza bake: spread a  juma julius mushroom with tomato sauce, low-fat shredded mozzarella and turkey pepperoni or [place] woods. Add any veggies. Roast for 10-15 minutes, until cheese melted.     - Cucumber crab bites: Spread ¼ cup crab dip (lump crabmeat + light cream cheese and green onions) over sliced cucumber.     - Chicken with light spinach and artichoke dip*: Puree in : 6oz cooked and drained spinach, 2 cloves garlic, 1 can cannelloni beans, ½ cup chopped green onions, 1 can drained artichoke hearts (not marinated in oil), lemon juice and basil. Mix in 2oz chopped up chicken.    Supper: (20-30g protein)    - Serve grilled fish over dark green salad tossed with low-fat dressing, served with grilled asparagus doherty     - Rotisserie chicken salad: served with sliced strawberries, walnuts, fat-free feta cheese crumbles and 1 tbsp Linns Own Light Raspberry [place] Vinaigrette    - Shrimp cocktail: Dip cold boiled shrimp in homemade low-sugar cocktail sauce (1/2 cup Micaela One Carb ketchup, 2 tbsp horseradish, 1/4 tsp hot sauce, 1 tsp Worcestershire sauce, 1 tbsp freshly-squeezed lemon juice). Serve with dark green salad, walnuts, and crumbled blue cheese drizzled with olive oil and Balsamic  vinegar    - Tuna Melt: Spread tuna salad onto 2 thick slices of tomato. Top with low-fat cheese and broil until cheese is melted. May also be made with chicken salad of shrimp salad. [place] with different types of cheeses.    - Chicken or beef fajitas (no tortilla, rice, beans, chips). Top meat and veggies w/ fresh salsa, fat free sour cream.     - Homemade low-fat Chili using extra lean ground beef or ground turkey. Top with shredded cheese and salsa as desired. May add dollop fat-free sour cream if desired    - Chicken parmesan: Top chicken breast w/ low sugar marinara sauce, mozzarella cheese and bake until chicken reaches 165*.  Serve w/ spaghetti SQUASH or [place] cut green beans    - Dinner Omelet with shrimp or chicken and onion, green peppers and chives.    - No noodle lasagna: Use sliced zucchini or eggplant in place of noodles.  Layer with part skim ricotta cheese and low sugar meat sauce (use very lean ground beef or ground turkey).    - Mexican chicken bake: Bake chunks of chicken breast or thigh with taco seasoning, Pace brand enchilada sauce, green onions and low-fat cheese. Serve with ¼ cup black beans or fat free refried beans topped with chopped tomatoes or salsa.    - Yodit frozen meatballs, simmered in Classico Marinara sauce. Different flavors of salsa or spaghetti sauce create different dishes! Sprinkle with parmesan cheese. Serve with grilled or steamed veggies, or a dark green salad.    - Simmer boneless skinless chicken thigh chunks in Classico Marinara sauce or roasted salsa until tender with chopped onion, bell pepper, garlic, mushrooms, spinach, etc.     - Hamburger or veggie burger, without the bun, dressed the way you like. Served with grilled or steamed veggies.    - Eggplant parmesan: Bake slices of eggplant at 350 degrees for 15 minutes. Layer tomato sauce, sliced eggplant and low-fat mozzarella cheese in a baking dish and cover with foil. Bake 30-40 more minutes or until  bubbly. Uncover and bake at 400 degrees for about 15 more minutes, or until top is slightly crisp.    - Fish tacos: grilled/baked white fish, wrapped in Minesh lettuce leaf, topped with salsa, shredded low-fat cheese, and light coleslaw.    - Chicken rick: Sprinkle chicken w/ 1 tsp of hidden [place] ranch dip mix. Then grill chicken and top with black beans, salsa and 1 tsp fat free sour cream.     - Cauliflower pizza crust: Use cauliflower as crust (see recipe on pinterest, no flour!). Top w/ low fat cheese, turkey pepperoni and veggies and bake again    - chicken or turkey crust pizza: use ground chicken or turkey instead of cauliflower, spread in [place] and bake at 350 for about 20-30 minutes(may want to add garlic, black pepper, oregano and other herbs to ground meat mixture).  Remove and top w/ low fat cheese, turkey pepperoni and veggies and bake again for another 10 minutes or until cheese is browned.     Snacks: (100-200 calories; >5g protein)    - 1 low-fat cheese stick with 8 cherry tomatoes or 1 serving fresh fruit  - 4 thin slices fat-free turkey breast and 1 slice low-fat cheese  - 4 thin slices fat-free honey ham with wedge of melon  - 6-8 edamame pods (equivalent to about 1/4 cup edamame without pods).   - 1/4 cup unsalted nuts with ½ cup fruit  - 6-oz container Dannon Light n Fit vanilla yogurt, topped with 1oz unsalted nuts         - apple, celery or baby carrots spread with 2 Tbsp PB2  - apple slices with 1 oz slice low-fat cheese  - Apple slices dipped in 2 Tbsp of PB2  - celery, cucumber, bell pepper or baby carrots dipped in ¼ cup hummus bean spread or light spinach and artichoke dip (*recipe in lunch section)  - celery, cucumber, baby carrots dipped in high protein greek yogurt (Mix 16 oz plain greek yogurt + 1 packet of hidden valley ranch dip mix)  - Anthony Links Beef Steak - 14g protein! (similar to beef jerky)  - 2 wedges Laughing Cow - Light Herb & Garlic Cheese with sliced cucumber or  green bell pepper  - 1/2 cup low-fat cottage cheese with ¼ cup fruit or ¼ cup salsa  - RTD Protein drinks: Atkins, Low Carb Slim Fast, EAS light, Muscle Milk Light, etc.  - Homemade Protein drinks: GNC Soy95, Isopure, Nectar, UNJURY, Whey Gourmet, etc. Mix 1 scoop powder with 8oz skim/1% milk or light soymilk.  - Protein bars: Atkins, EAS, Pure Protein, Think Thin, Detour, etc. Must have 0-4 grams sugar - Read the label.    Takeout Options: No more than twice/week  Deli - Salads (no pasta or rice), meats, cheeses. Roasted chicken. Lox (salmon)    Mexican - Platters which don't include tortillas, chips, or rice. Go easy on the beans. Example: Fajitas without the tortillas. Ask the  not to bring chips to the table if they are too tempting.    Greek - Meat or fish and vegetable, but no bread or rice. Including hummus, baba ganoush, etc, is OK. Most sit-down Greek restaurants can provide you with cucumber slices for dipping instead of rosaline bread.    Fast Food (Avoid as much as possible) - Salads (no croutons and limit salad dressing to 2 tbsp), grilled chicken sandwich without the bun and ask for no guardado. Tabathas low fat chili or Taco Bell pintos and cheese.    BBQ - The meats are fine if you ask for sauces on the side, but most of the traditional side dishes are loaded with carbs. Man slaw, baked beans and BBQ sauce are typically made with sugar.    Chinese - Nothing deep-fried, no rice or noodles. Many Chinese sauces have starch and sugar in them, so you'll have to use your judgement. If you find that these sauces trigger cravings, or cause Dumping, you can ask for the sauce to be made without sugar or just use soy sauce.         Language Assistance Services     ATTENTION: Language assistance services are available, free of charge. Please call [phone].      ATENCIÓN: Si habla carlieañol, tiene a johansen disposición servicios gratuitos de asistencia lingüística. Llame al [phone].     JUAN JOSE Ý: N?u b?n nói  Ti?ng Vi?t, có các d?ch v? h? tr? ngôn ng? mi?n phí dành cho b?n. G?i s? [phone].         [facility] complies with applicable Federal civil rights laws and does not discriminate on the basis of race, color, national origin, age, disability, or sex.

## 2017-05-10 NOTE — PROGRESS NOTES
Patient verified by 2 identifiers and allergies reviewed.    Denies previous reactions to blood transfusions, and no known holes in heart.  22g IV placed to Rt Hand.  DSE & Echo Contrast explained to patient, consents obtained & testing completed.  IV flushed w/ 10cc NS pre & post contrast administration.  3cc Optison administered.  Pt tolerated testing well. IV removed post testing.  Post study discharge instructions reviewed with patient, patient verbalized understanding.  Patient discharged to home in stable condition.

## 2017-05-10 NOTE — PATIENT INSTRUCTIONS
1200- 1500 calorie Sample meal plan   80-120g protein per day.   Protein drinks and bars: 0-4 grams sugar   Drink lots of water throughout the day and exercise!   MENU # 1   Breakfast: 2 eggs, 1 turkey sausage abigail, 1 apple   Snack: P3 protein pack  Lunch: 2 roll-ups (sliced turkey, low-fat sliced cheese, mustard), 12 baby carrots dipped in 1 Tbsp natural peanut butter   Mid-Day Snack: ¼ cup unsalted almonds, ½ cup fruit   Dinner: 1 chicken thigh simmered in tomato sauce + 2 Tbsp mozzarella cheese, ½ cup black beans, 1/2 cup steamed carrots   Evening Snack: 1/2 cup grapes with 4 cubes low-fat cheddar cheese   ___________________________________________________   MENU # 2   Breakfast: 200 calorie protein drink   Mid-morning snack : ¼ cup unsalted nuts, medium banana   Lunch: 3oz tuna or chicken salad made with 2 tbsp light guardado, over salad with tomatoes and cucumbers.   Snack: low-fat cheese stick   Dinner: 3oz hamburger abigail, slice of low-fat cheese, 1 cup yellow squash and zucchini sauteed in nonstick cookspray  Snack: 6oz light yogurt   _______________________________________________________   Menu #3   Breakfast: 6oz plain Greek yogurt + fruit (½ banana OR ½ cup fruit - pineapple, blueberries, strawberries, peach), may add Splenda to tacho.   Lunch: Grilled chicken breast w/ slice low-fat pepper alyce cheese, 1/2 cup grilled/baked asparagus and small salad with Salad Spritzer.   Mid-Day snack: 200 calorie protein drink   Dinner: 4oz Grilled fish, ½ cup white beans, ½ cup cooked spinach   Evening Snack: fudgsicle no-sugar-added   Menu # 4   Breakfast: 1 scoop vanilla protein powder + 4oz skim milk + 4oz coffee   Snack: Pure protein bar   Lunch: 2 Lettuce tacos: 3oz seasoned ground turkey wrapped in a Minesh lettuce leaves with 1 Tbsp shredded cheese and dollop low-fat sour cream   Snack: ½ cup cottage cheese, ½ cup pineapple chunks   Dinner: Shrimp omelet: 2 eggs, ½ cup shrimp, green onions, and shredded  cheese   ______________________________________________________   Menu #5   Breakfast: 1 cup low-fat cottage cheese, ½ cup peaches (no sugar added)   Snack: 1 apple, 4 cubes cheese   Lunch: 3oz baked pork chop, 1 cup okra and tomato stew   Snack: 1/2 cup black beans + salsa + dollop light sour cream   Dinner: Caprese chicken salad: 3 oz chicken breast, 1oz fresh mozzarella, sliced tomato, 1 Tbsp olive oil, basil   Snack: sugar-free popsicle   _______________________________________________________  Menu #6   Breakfast: ½ cup part-skim ricotta cheese, 2 Tbsp sugar-free strawberry preserves, sprinkle of slivered almonds   Snack: 1 orange + string cheese  Lunch: 3 oz canned chicken, 1oz shredded cheddar cheese, ½ cup black beans, 2 Tbsp salsa   Snack: 200 calorie Protein drink   Dinner: 4 oz grilled salmon steak, over mixed green salad with 2 tbsp light dressing   _______________________________________________________  Menu #7  Breakfast: crust-less quiche (bake 1 egg mixed w/ low fat cheese, broccoli and low sodium ham in a muffin cup until cooked inside)  Snack: 1 light yogurt  Lunch: protein shake (1 scoop powder + 8 oz skim milk, blended w/ ice)  Snack: small apple w/ 2 tbsp PB2 (powdered peanut butter)  Dinner: 2 Turkey meatballs w/ low sugar marinara sauce, 1/2 cup spiralized zucchini (sauteed on stove top w/ nonstick cookspray)      Meal Ideas for Regular Bariatric Diet  *Recipes and products available at [email or web address].com      Breakfast: (15-20g protein)    - Egg white omelet: 2 egg whites or ½ cup Egg Beaters. (Optional proteins: cheese, shrimp, black beans, chicken, sliced turkey) (Optional veggies: tomatoes, salsa, spinach, mushrooms, onions, green peppers, or small slice avocado)     - Egg and sausage: 1 egg or ¼ cup Egg Beaters (any variety), with 1 abigail or 2 links of Turkey sausage or Veggie breakfast sausage ([employer] or [employer])    - Crust-less breakfast quiche: To make a glass pie dish,  mix 4oz part skim Ricotta, 1 cup skim milk, and 2 eggs as your base. Add protein: shredded cheese, sliced lean ham or turkey, turkey woods/sausage. Add veggies: tomato, onion, green onion, mushroom, green pepper, spinach, etc.    - Yogurt parfait: Mix 1 - 6oz container Dannon Light N Fit vanilla yogurt, with ¼ cup crushed unsalted nuts    - Cottage cheese and fruit: ½ cup part-skim cottage cheese or ricotta cheese topped with fresh fruit or sugar free preserves     - Dinora Pedraza's Vanilla Egg custard* (add 2 Tbsp instant coffee granules to make Cappuccino Custard*)    - Hi-Protein café latte (skim milk, decaf coffee, 1 scoop protein powder). Optional to add Sugar free syrup or extract flavoring.    - Breakfast Lox: spread fat free cream cheese on slices of smoked salmon. Serve over scrambled or egg over easy (sauteed with nonstick cookspray) OR on a cucumber slice    - Eggwhich: Scramble or cook 1 large egg over easy using nonstick cookspray. Place between 2 slices of [place] woods and low fat cheese.     Lunch: (20-30g protein)    - ½ cup Black bean soup (Homemade or Progresso), with ¼ cup shredded low-fat cheese. Top with chopped tomato or fresh salsa.     - Lean deli turkey breast and low-fat sliced cheese, mustard or light guardado to moisten, rolled up together, or wrapped in a Minesh lettuce leaf    - Chicken salad made from dinner leftovers, moisten with low-fat salad dressing or light guardado. Also try leftover salmon, shrimp, tuna or boiled eggs. Serve ½ cup over dark green salad    - Fat-free canned refried beans, topped with ¼ cup shredded low-fat cheese. Top with chopped tomato or fresh salsa.     - Greek salad: Top mixed greens with 1-2oz grilled chicken, tomatoes, red onions, 2-3 kalamata olives, and sprinkle lightly with feta cheese. Spritz with Balsamic vinegar to taste.     - Crust-less lunch quiche: To make a glass pie dish, mix 4oz part skim Ricotta, 1 cup skim milk, and 2 eggs as your base. Add  protein: shredded cheese, sliced lean ham or turkey, shrimp, chicken. Add veggies: tomato, onion, green onion, mushroom, green pepper, spinach, artichoke, broccoli, etc.    - Pizza bake: spread a  juma julius mushroom with tomato sauce, low-fat shredded mozzarella and turkey pepperoni or Horatio woods. Add any veggies. Roast for 10-15 minutes, until cheese melted.     - Cucumber crab bites: Spread ¼ cup crab dip (lump crabmeat + light cream cheese and green onions) over sliced cucumber.     - Chicken with light spinach and artichoke dip*: Puree in : 6oz cooked and drained spinach, 2 cloves garlic, 1 can cannelloni beans, ½ cup chopped green onions, 1 can drained artichoke hearts (not marinated in oil), lemon juice and basil. Mix in 2oz chopped up chicken.    Supper: (20-30g protein)    - Serve grilled fish over dark green salad tossed with low-fat dressing, served with grilled asparagus doherty     - Rotisserie chicken salad: served with sliced strawberries, walnuts, fat-free feta cheese crumbles and 1 tbsp Linns Own Light Raspberry [place] Vinaigrette    - Shrimp cocktail: Dip cold boiled shrimp in homemade low-sugar cocktail sauce (1/2 cup Micaela One Carb ketchup, 2 tbsp horseradish, 1/4 tsp hot sauce, 1 tsp Worcestershire sauce, 1 tbsp freshly-squeezed lemon juice). Serve with dark green salad, walnuts, and crumbled blue cheese drizzled with olive oil and Balsamic vinegar    - Tuna Melt: Spread tuna salad onto 2 thick slices of tomato. Top with low-fat cheese and broil until cheese is melted. May also be made with chicken salad of shrimp salad. [place] with different types of cheeses.    - Chicken or beef fajitas (no tortilla, rice, beans, chips). Top meat and veggies w/ fresh salsa, fat free sour cream.     - Homemade low-fat Chili using extra lean ground beef or ground turkey. Top with shredded cheese and salsa as desired. May add dollop fat-free sour cream if desired    - Chicken parmesan:  Top chicken breast w/ low sugar marinara sauce, mozzarella cheese and bake until chicken reaches 165*.  Serve w/ spaghetti SQUASH or Danish cut green beans    - Dinner Omelet with shrimp or chicken and onion, green peppers and chives.    - No noodle lasagna: Use sliced zucchini or eggplant in place of noodles.  Layer with part skim ricotta cheese and low sugar meat sauce (use very lean ground beef or ground turkey).    - Mexican chicken bake: Bake chunks of chicken breast or thigh with taco seasoning, Pace brand enchilada sauce, green onions and low-fat cheese. Serve with ¼ cup black beans or fat free refried beans topped with chopped tomatoes or salsa.    - Yodit frozen meatballs, simmered in Classico Marinara sauce. Different flavors of salsa or spaghetti sauce create different dishes! Sprinkle with parmesan cheese. Serve with grilled or steamed veggies, or a dark green salad.    - Simmer boneless skinless chicken thigh chunks in Classico Marinara sauce or roasted salsa until tender with chopped onion, bell pepper, garlic, mushrooms, spinach, etc.     - Hamburger or veggie burger, without the bun, dressed the way you like. Served with grilled or steamed veggies.    - Eggplant parmesan: Bake slices of eggplant at 350 degrees for 15 minutes. Layer tomato sauce, sliced eggplant and low-fat mozzarella cheese in a baking dish and cover with foil. Bake 30-40 more minutes or until bubbly. Uncover and bake at 400 degrees for about 15 more minutes, or until top is slightly crisp.    - Fish tacos: grilled/baked white fish, wrapped in Minesh lettuce leaf, topped with salsa, shredded low-fat cheese, and light coleslaw.    - Chicken rick: Sprinkle chicken w/ 1 tsp of hidden [place] ranch dip mix. Then grill chicken and top with black beans, salsa and 1 tsp fat free sour cream.     - Cauliflower pizza crust: Use cauliflower as crust (see recipe on anitha no flour!). Top w/ low fat cheese, turkey pepperoni and  veggies and bake again    - chicken or turkey crust pizza: use ground chicken or turkey instead of cauliflower, spread in [place] and bake at 350 for about 20-30 minutes(may want to add garlic, black pepper, oregano and other herbs to ground meat mixture).  Remove and top w/ low fat cheese, turkey pepperoni and veggies and bake again for another 10 minutes or until cheese is browned.     Snacks: (100-200 calories; >5g protein)    - 1 low-fat cheese stick with 8 cherry tomatoes or 1 serving fresh fruit  - 4 thin slices fat-free turkey breast and 1 slice low-fat cheese  - 4 thin slices fat-free honey ham with wedge of melon  - 6-8 edamame pods (equivalent to about 1/4 cup edamame without pods).   - 1/4 cup unsalted nuts with ½ cup fruit  - 6-oz container Dannon Light n Fit vanilla yogurt, topped with 1oz unsalted nuts         - apple, celery or baby carrots spread with 2 Tbsp PB2  - apple slices with 1 oz slice low-fat cheese  - Apple slices dipped in 2 Tbsp of PB2  - celery, cucumber, bell pepper or baby carrots dipped in ¼ cup hummus bean spread or light spinach and artichoke dip (*recipe in lunch section)  - celery, cucumber, baby carrots dipped in high protein greek yogurt (Mix 16 oz plain greek yogurt + 1 packet of hidden [place] ranch dip mix)  - Anthony Links Beef Steak - 14g protein! (similar to beef jerky)  - 2 wedges [place] - Light Herb & Garlic Cheese with sliced cucumber or green bell pepper  - 1/2 cup low-fat cottage cheese with ¼ cup fruit or ¼ cup salsa  - RTD Protein drinks: Atkins, Low Carb Slim Fast, EAS light, Muscle Milk Light, etc.  - Homemade Protein drinks: GNC Soy95, Isopure, Nectar, UNJURY, Whey Gourmet, etc. Mix 1 scoop powder with 8oz skim/1% milk or light soymilk.  - Protein bars: Atkins, EAS, Pure Protein, Think Thin, Detour, etc. Must have 0-4 grams sugar - Read the label.    Takeout Options: No more than twice/week  Deli - Salads (no pasta or rice), meats, cheeses. Roasted chicken.  Lox (salmon)    Mexican - Platters which don't include tortillas, chips, or rice. Go easy on the beans. Example: Fajitas without the tortillas. Ask the  not to bring chips to the table if they are too tempting.    Greek - Meat or fish and vegetable, but no bread or rice. Including hummus, baba ganoush, etc, is OK. Most sit-down Greek restaurants can provide you with cucumber slices for dipping instead of rosaline bread.    Fast Food (Avoid as much as possible) - Salads (no croutons and limit salad dressing to 2 tbsp), grilled chicken sandwich without the bun and ask for no guardado. Tabathas low fat chili or Taco Bell pintos and cheese.    BBQ - The meats are fine if you ask for sauces on the side, but most of the traditional side dishes are loaded with carbs. Man slaw, baked beans and BBQ sauce are typically made with sugar.    Chinese - Nothing deep-fried, no rice or noodles. Many Chinese sauces have starch and sugar in them, so you'll have to use your judgement. If you find that these sauces trigger cravings, or cause Dumping, you can ask for the sauce to be made without sugar or just use soy sauce.

## 2017-05-10 NOTE — PROGRESS NOTES
"NUTRITIONAL CONSULT    Referring Physician: Todd Massey M.D.  Reason for MNT Referral: Initial assessment for sleeve gastrectomy work-up    PAST MEDICAL HISTORY:   36 y.o. male presents with a BMI of Body mass index is 49.35 kg/(m^2)..  Weight history includes being overweight since his late 20's after a divorce. His highest weight is his current weight, 350 lbs. He has already cut out fast food and beer. He is not able to be as active as he wants. 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 He eats very large portions.   Dieting attempts include diet modification and juicing. The most weight lost was 10 lbs with diet modification. He reports losing about 30 lbs with juicing.     Past Medical History:   Diagnosis Date    Diabetes mellitus     Hyperlipidemia     Hypertension        CLINICAL DATA:  36 y.o.-year-old Unknown male.  Height: 5'10"   Weight: 343 lbs  IBW: 156 lbs  BMI: 49.35  Weight at Initial Consult: 350 lbs  The patient's goal weight (50% EBW): 250 lbs  Personal goal weight: 190-200 lbs    Goal for Bariatric Surgery: to improve health, to improve quality of life, to lose weight and to prevent future medical conditions "check engine light is on" Wants to be able to run with his grandson.     NUTRITION & HEALTH HISTORY:  Greatest challenge: dining out frequency, sweets, starchy CHO, portion control, irregular meal patterns, emotional eating and high-fat diet    Current diet recall:     Day #1  B: [employer]aff[employer]-- eggs + 2 servings of hashbrowns + water  Sn: BBQ Lays Chips + water  L: salad + water  Sn: peanuts + water  D: grilled fish + water    Day #2  B: bowl of cereal + water  Sn: peanuts + water  L: Burger Vasyl-- chicken sandwich + water  D: Taco Bell--2 tacos + water    Day #3  B: bowl of cereal + milk  Sn: 1 bag of chips   D: 3 pieces of chicken with fries + 1 bottle of water    Patient stated that he can go days without any sweets but when he does have " cookies that he will eat the whole box or he can go days without eating fried chicken but then when he does indulge he will eat a 5 piece box.     Current Diet:  Meal pattern: 2-3 meals a day; 1-2 snacks (mostly junk foods)  Protein supplements: Premier 2/day (one in the morning/afternoon)  Snackin-2 / day (junk foods)  Vegetables: Likes a few. Eats 2-3 times per week.  Fruits: Likes a variety. Eats 2-3 times per week.  Beverages: water, sugary beverages, sweet tea, coffee without sugar and 2% milk  Dining out: Daily. Mostly fast food and restaurants. Taco Bell, Waffle House, [employer], ticckle  Cooking at home: 4x Weekly. Mostly baked and grilled meat, fish and vegetables.    Exercise:  Past exercise: Adequate   Walking and weight lifting x 3 times a week for about 8 months    Current exercise: None  Restrictions to exercise: none    Vitamins / Minerals / Herbs:   Vitamin D-3 5000 IU  MVI  B-complex  Vitamin C     Food Allergies:   none    Social:  Works day and/or night shifts. Works as a  at night time  Lives with wife and step-daughter (9).  Grocery shopping and food prep performed by wife.  Patient believes the household will be supportive after surgery.  Alcohol: Twice a week. Merlot. .  Smoking: None.    ASSESSMENT:  · Patient reports attempts at weight loss, only to regain lost weight.  · Patient demonstrated knowledge of healthy eating behaviors and exercise patterns; admits to not eating healthy and not exercising at this point.  · Patient states willingness to change lifestyle and make behavior modifications.  · Expect good  compliance after surgery at this time.    Insurance requires 3 medically supervised diet prior to consideration for bariatric surgery.    BARIATRIC DIET DISCUSSION:  Discussed diet after surgery and related to patients food record.  Reviewed diet progression before and after surgery.  Reinforced that surgery is not a magic bullet and importance of low fat foods and no  snacking.  Stressed importance of exercise and its role in achieving weight loss goals.  Answered all questions.    RECOMMENDATIONS:  Patient is a potential candidate for bariatric surgery.    Needs 2 additional visit(s) with RD.    PLAN:  Continue to review Bariatric Nutrition Guidebook at home and call with any questions.  Work on Bariatric Nutrition Checklist.  Work on expanding variety of vegetables.  Work on gradually cutting back on starchy CHO in the diet.  Begin trying various protein supplements to determine preference.  1500-calorie diet.  5-6 meals per day.  Start including protein supplements in the diet plan daily.  Reduce frequency of dining out.  More grocery shopping and meal preparation at home.  Increase exercise.  Return to clinic.    SESSION TIME:  60 minutes

## 2017-05-11 ENCOUNTER — DOCUMENTATION ONLY (OUTPATIENT)
Dept: BARIATRICS | Facility: CLINIC | Age: 37
End: 2017-05-11

## 2017-05-15 ENCOUNTER — LAB VISIT (OUTPATIENT)
Dept: LAB | Facility: OTHER | Age: 37
End: 2017-05-15
Attending: INTERNAL MEDICINE
Payer: COMMERCIAL

## 2017-05-15 ENCOUNTER — OFFICE VISIT (OUTPATIENT)
Dept: INTERNAL MEDICINE | Facility: CLINIC | Age: 37
End: 2017-05-15
Payer: COMMERCIAL

## 2017-05-15 VITALS
HEART RATE: 80 BPM | HEIGHT: 69 IN | SYSTOLIC BLOOD PRESSURE: 130 MMHG | BODY MASS INDEX: 46.65 KG/M2 | DIASTOLIC BLOOD PRESSURE: 96 MMHG | WEIGHT: 315 LBS

## 2017-05-15 DIAGNOSIS — E78.00 PURE HYPERCHOLESTEROLEMIA: ICD-10-CM

## 2017-05-15 DIAGNOSIS — D64.9 NORMOCYTIC ANEMIA: ICD-10-CM

## 2017-05-15 DIAGNOSIS — E66.01 MORBID OBESITY WITH BMI OF 50.0-59.9, ADULT: ICD-10-CM

## 2017-05-15 DIAGNOSIS — E11.9 CONTROLLED TYPE 2 DIABETES MELLITUS WITHOUT COMPLICATION, WITHOUT LONG-TERM CURRENT USE OF INSULIN: Primary | ICD-10-CM

## 2017-05-15 DIAGNOSIS — I10 ESSENTIAL HYPERTENSION: ICD-10-CM

## 2017-05-15 LAB
BASOPHILS # BLD AUTO: 0.02 K/UL
BASOPHILS NFR BLD: 0.2 %
DIFFERENTIAL METHOD: ABNORMAL
EOSINOPHIL # BLD AUTO: 0.4 K/UL
EOSINOPHIL NFR BLD: 5 %
ERYTHROCYTE [DISTWIDTH] IN BLOOD BY AUTOMATED COUNT: 12.8 %
FERRITIN SERPL-MCNC: 74 NG/ML
HCT VFR BLD AUTO: 38.3 %
HGB BLD-MCNC: 13 G/DL
LYMPHOCYTES # BLD AUTO: 3.4 K/UL
LYMPHOCYTES NFR BLD: 40.7 %
MCH RBC QN AUTO: 30.7 PG
MCHC RBC AUTO-ENTMCNC: 33.9 %
MCV RBC AUTO: 91 FL
MONOCYTES # BLD AUTO: 0.7 K/UL
MONOCYTES NFR BLD: 8.4 %
NEUTROPHILS # BLD AUTO: 3.8 K/UL
NEUTROPHILS NFR BLD: 45.3 %
PLATELET # BLD AUTO: 363 K/UL
PMV BLD AUTO: 9.2 FL
RBC # BLD AUTO: 4.23 M/UL
RETICS/RBC NFR AUTO: 1.6 %
WBC # BLD AUTO: 8.33 K/UL

## 2017-05-15 PROCEDURE — 99205 OFFICE O/P NEW HI 60 MIN: CPT | Mod: S$GLB,,, | Performed by: INTERNAL MEDICINE

## 2017-05-15 PROCEDURE — 85045 AUTOMATED RETICULOCYTE COUNT: CPT

## 2017-05-15 PROCEDURE — 83020 HEMOGLOBIN ELECTROPHORESIS: CPT

## 2017-05-15 PROCEDURE — 4010F ACE/ARB THERAPY RXD/TAKEN: CPT | Mod: S$GLB,,, | Performed by: INTERNAL MEDICINE

## 2017-05-15 PROCEDURE — 1160F RVW MEDS BY RX/DR IN RCRD: CPT | Mod: S$GLB,,, | Performed by: INTERNAL MEDICINE

## 2017-05-15 PROCEDURE — 3044F HG A1C LEVEL LT 7.0%: CPT | Mod: S$GLB,,, | Performed by: INTERNAL MEDICINE

## 2017-05-15 PROCEDURE — 36415 COLL VENOUS BLD VENIPUNCTURE: CPT

## 2017-05-15 PROCEDURE — 3075F SYST BP GE 130 - 139MM HG: CPT | Mod: S$GLB,,, | Performed by: INTERNAL MEDICINE

## 2017-05-15 PROCEDURE — 3080F DIAST BP >= 90 MM HG: CPT | Mod: S$GLB,,, | Performed by: INTERNAL MEDICINE

## 2017-05-15 PROCEDURE — 82728 ASSAY OF FERRITIN: CPT

## 2017-05-15 PROCEDURE — 85025 COMPLETE CBC W/AUTO DIFF WBC: CPT

## 2017-05-15 PROCEDURE — 99999 PR PBB SHADOW E&M-EST. PATIENT-LVL III: CPT | Mod: PBBFAC,,, | Performed by: INTERNAL MEDICINE

## 2017-05-15 RX ORDER — AMLODIPINE BESYLATE 10 MG/1
10 TABLET ORAL DAILY
Qty: 90 TABLET | Refills: 3 | Status: SHIPPED | OUTPATIENT
Start: 2017-05-15 | End: 2018-01-31 | Stop reason: SDUPTHER

## 2017-05-15 NOTE — PROGRESS NOTES
"Subjective:       Patient ID: Nain Jain is a 36 y.o. male.    Chief Complaint: Establish Care    HPI Comments:   New pt.    Here to establish care.    Feeling well.  No c/o.    Pt is planning on getting a gastric sleeve done soon.      PMH  -DM2, dx'd approx 2 yrs ago.   -HTN, dx'd also approx 2 yrs ago.        Review of Systems   Constitutional: Negative.    HENT: Negative.    Eyes: Negative.    Respiratory: Negative.    Cardiovascular: Negative.    Gastrointestinal: Negative.    Genitourinary: Negative.    Musculoskeletal: Negative.    Skin: Negative.    Neurological: Negative.    Psychiatric/Behavioral: Negative.        Past Medical History:   Diagnosis Date    Diabetes mellitus     Hyperlipidemia     Hypertension     Sleep apnea        History reviewed. No pertinent surgical history.    Family History   Problem Relation Age of Onset    Diabetes Mother     Obesity Mother     Hypertension Mother     Diabetes Maternal Grandmother     No Known Problems Father     No Known Problems Sister     No Known Problems Brother     No Known Problems Brother     No Known Problems Sister     No Known Problems Sister        Social History     Social History    Marital status:      Spouse name: N/A    Number of children: N/A    Years of education: N/A     Occupational History          Social History Main Topics    Smoking status: Never Smoker    Smokeless tobacco: Never Used    Alcohol use 1.2 oz/week     2 Glasses of wine per week    Drug use: No    Sexual activity: Not Asked     Other Topics Concern    None     Social History Narrative    Lives w/wife and 1 child.       Objective:       Vitals:    05/15/17 0747   BP: (!) 130/96   BP Location: Left arm   Patient Position: Sitting   BP Method: Manual   Pulse: 80   Weight: (!) 158.5 kg (349 lb 6.9 oz)   Height: 5' 9" (1.753 m)     Physical Exam   Constitutional: He is oriented to person, place, and time. He appears well-developed and " well-nourished.   HENT:   Head: Normocephalic and atraumatic.   Right Ear: Tympanic membrane, external ear and ear canal normal.   Left Ear: Tympanic membrane, external ear and ear canal normal.   Mouth/Throat: Uvula is midline and oropharynx is clear and moist. No oropharyngeal exudate or posterior oropharyngeal erythema.   Eyes: Conjunctivae and EOM are normal. Pupils are equal, round, and reactive to light.   Neck: Normal range of motion. Neck supple. No thyromegaly present.   Cardiovascular: Normal rate, regular rhythm and normal heart sounds.  Exam reveals no gallop and no friction rub.    No murmur heard.  Pulmonary/Chest: Effort normal and breath sounds normal. He has no wheezes. He has no rhonchi. He has no rales.   Abdominal: Soft. Bowel sounds are normal. He exhibits no distension. There is no tenderness. There is no rebound and no guarding.   Musculoskeletal: Normal range of motion. He exhibits no edema or tenderness.   Lymphadenopathy:     He has no cervical adenopathy.   Neurological: He is alert and oriented to person, place, and time. He has normal strength and normal reflexes. No cranial nerve deficit or sensory deficit. He exhibits normal muscle tone. Coordination normal.   Skin: Skin is warm and dry. No rash noted.   Psychiatric: He has a normal mood and affect. His behavior is normal. Thought content normal.       Assessment:       1. Controlled type 2 diabetes mellitus without complication, without long-term current use of insulin    2. Normocytic anemia    3. Essential hypertension    4. Pure hypercholesterolemia    5. Morbid obesity with BMI of 50.0-59.9, adult        Plan:           DM2 - Controlled on metformin.  Cont this.     HTN - Above goal.  Cont current tx and add amlodipine. Hopefully amlodipine can be dropped after pt loses weight from surgery.      Anemia - Incidentally found on labs.  Normocytic.  Pt thinks a sister has sickle disease or trait - he isn't sure.  Will repeat CBC and  also check ferritin, retics and measure for HbS.      HLD - HDL is low.  Pt encouraged to exercise more.  Cont statin.     Obesity - Pt is planning on having gastric sleeve and is already established w/Bariatrics.

## 2017-05-15 NOTE — MR AVS SNAPSHOT
[facility]  [address]  [place] [address]  Phone: [phone]  Fax: [phone]                  Nain Jain   5/15/2017 8:00 AM   Office Visit    Description:  Male : 1980   Provider:  Obed Gallardo MD   Department:  [facility]           Reason for Visit     Establish Care           Diagnoses this Visit        Comments    Controlled type 2 diabetes mellitus without complication, without long-term current use of insulin    -  Primary     Normocytic anemia         Essential hypertension                To Do List           Future Appointments        Provider Department Dept Phone    5/15/2017 9:45 AM LAB, SAME DAY [facility] [phone]    2017 8:45 AM Saira Harrington OD [facility] [phone]    2017 1:00 PM HOME STUDY, SLEEP [facility] [phone]    2017 9:00 AM Tyler Alejandre RD [facility] - Bariatric Surgery [phone]      Goals (5 Years of Data)     None      Follow-Up and Disposition     Return in about 1 month (around 6/15/2017).       These Medications        Disp Refills Start End    amlodipine (NORVASC) 10 MG tablet 90 tablet 3 5/15/2017 5/15/2018    Take 1 tablet (10 mg total) by mouth once daily. - Oral    Pharmacy: [id number] - [place], [place] - [address] Ph #: [phone]         [facility]     [facility] Nurse Care Line -  Assistance  Unless otherwise directed by your provider, please contact [facility], our nurse care line that is available for / assistance.     Registered nurses in the [facility] provide: appointment scheduling, clinical advisement, health education, and other advisory services.  Call: [phone] (toll free)               Medications           Message regarding Medications     Verify the changes and/or additions to your medication regime listed below are the same as discussed  "with your clinician today.  If any of these changes or additions are incorrect, please notify your healthcare provider.        START taking these NEW medications        Refills    amlodipine (NORVASC) 10 MG tablet 3    Sig: Take 1 tablet (10 mg total) by mouth once daily.    Class: Normal    Route: Oral           Verify that the below list of medications is an accurate representation of the medications you are currently taking.  If none reported, the list may be blank. If incorrect, please contact your healthcare provider. Carry this list with you in case of emergency.           Current Medications     metformin (FORTAMET) 500 mg 24 hr tablet Take 500 mg by mouth daily with breakfast.    olmesartan (BENICAR) 40 MG tablet     rosuvastatin (CRESTOR) 40 MG Tab Take 10 mg by mouth every evening.    amlodipine (NORVASC) 10 MG tablet Take 1 tablet (10 mg total) by mouth once daily.           Clinical Reference Information           Your Vitals Were     BP Pulse Height Weight BMI    130/96 (BP Location: Left arm, Patient Position: Sitting, BP Method: Manual) 80 5' 9" (1.753 m) 158.5 kg (349 lb 6.9 oz) 51.6 kg/m2      Blood Pressure          Most Recent Value    BP  (!)  130/96      Allergies as of 5/15/2017     No Known Allergies      Immunizations Administered on Date of Encounter - 5/15/2017     None      Orders Placed During Today's Visit      Normal Orders This Visit    Ambulatory Referral to Optometry     Future Labs/Procedures Expected by Expires    CBC auto differential  5/15/2017 7/14/2018    Ferritin  5/15/2017 7/14/2018    HEMOGLOBIN S QUANTITATION BY ELECTROPHORESIS  5/15/2017 7/14/2018    Reticulocytes  5/15/2017 7/14/2018      Language Assistance Services     ATTENTION: Language assistance services are available, free of charge. Please call [phone].      ATENCIÓN: Si habla jaden, tiene a johansen disposición servicios gratuitos de asistencia lingüística. Llame al [phone].     JUAN JOSE Ý: N?u b?n nói Ti?ng " Vi?t, có các d?ch v? h? tr? ngôn ng? mi?n phí kevenh cho b?n. G?i s? [phone].         [facility] complies with applicable Federal civil rights laws and does not discriminate on the basis of race, color, national origin, age, disability, or sex.

## 2017-05-17 ENCOUNTER — PATIENT MESSAGE (OUTPATIENT)
Dept: INTERNAL MEDICINE | Facility: CLINIC | Age: 37
End: 2017-05-17

## 2017-05-17 ENCOUNTER — TELEPHONE (OUTPATIENT)
Dept: INTERNAL MEDICINE | Facility: CLINIC | Age: 37
End: 2017-05-17

## 2017-05-17 NOTE — TELEPHONE ENCOUNTER
----- Message from Bina Jaziel sent at 5/17/2017 11:42 AM CDT -----  _  1st Request  _  2nd Request  _  3rd Request        Who: CONSTANCE ROWLAND [[id number]]    Why: Pt called to get his lab results. Please call him     What Number to Call Back: [phone]    When to Expect a call back: (Before the end of the day)   -- if the call is after 12:00, the call back will be tomorrow.

## 2017-05-18 ENCOUNTER — PATIENT MESSAGE (OUTPATIENT)
Dept: INTERNAL MEDICINE | Facility: CLINIC | Age: 37
End: 2017-05-18

## 2017-05-18 ENCOUNTER — TELEPHONE (OUTPATIENT)
Dept: SLEEP MEDICINE | Facility: OTHER | Age: 37
End: 2017-05-18

## 2017-05-18 ENCOUNTER — OFFICE VISIT (OUTPATIENT)
Dept: OPTOMETRY | Facility: CLINIC | Age: 37
End: 2017-05-18
Payer: COMMERCIAL

## 2017-05-18 DIAGNOSIS — D64.9 NORMOCYTIC ANEMIA: Primary | ICD-10-CM

## 2017-05-18 DIAGNOSIS — H35.9 RETINAL LESION: ICD-10-CM

## 2017-05-18 DIAGNOSIS — E11.9 TYPE 2 DIABETES MELLITUS WITHOUT RETINOPATHY: Primary | ICD-10-CM

## 2017-05-18 LAB
HGB FRACT BLD ELPH-IMP: NORMAL
HGB S MFR BLD ELPH: 0 %

## 2017-05-18 PROCEDURE — 92250 FUNDUS PHOTOGRAPHY W/I&R: CPT | Mod: S$GLB,,, | Performed by: OPTOMETRIST

## 2017-05-18 PROCEDURE — 99999 PR PBB SHADOW E&M-EST. PATIENT-LVL II: CPT | Mod: PBBFAC,,, | Performed by: OPTOMETRIST

## 2017-05-18 PROCEDURE — 92004 COMPRE OPH EXAM NEW PT 1/>: CPT | Mod: S$GLB,,, | Performed by: OPTOMETRIST

## 2017-05-18 NOTE — PATIENT INSTRUCTIONS
Your eyes are very healthy; there are no signs ocular complications from diabetes.  Please continue working with your primary care doctor to manage the diabetes.  It is important you have your eyes checked every year to evaluate the health of your eyes. We will send you a reminder in 1 year for your next examination, or please contact us sooner if you need us.    Kathy Casas OD

## 2017-05-18 NOTE — TELEPHONE ENCOUNTER
Results reviewed.  Patient notified of results via [employer] message.      Please assist w/referral

## 2017-05-18 NOTE — Clinical Note
Dear Dr. Gallardo,  Thank you for referring Mr. Jain for a diabetic eye examination; there is no retinopathy. However, he has an unusual retinal lesion in the left eye and I have sent him for additional testing. Please let me know if you have questions.  Sincerely, Kathy Casas OD

## 2017-05-18 NOTE — MR AVS SNAPSHOT
[facility]  [address]  [facility] [address]  Phone: [phone]                  Nain Jain   2017 10:40 AM   Office Visit    Description:  Male : 1980   Provider:  Kathy Casas OD   Department:  [facility]           Reason for Visit     Diabetic Eye Exam                To Do List           Future Appointments        Provider Department Dept Phone    2017 1:00 PM HOME STUDY, SLEEP [facility] [place] [phone]    2017 9:00 AM CRYSTAL Vizcarra [facility] - Bariatric Surgery [phone]      Goals (5 Years of Data)     None      [facility]     [facility] Nurse Care Line -  Assistance  Unless otherwise directed by your provider, please contact [facility], our nurse care line that is available for  assistance.     Registered nurses in the [facility] provide: appointment scheduling, clinical advisement, health education, and other advisory services.  Call: [phone] (toll free)               Medications           Message regarding Medications     Verify the changes and/or additions to your medication regime listed below are the same as discussed with your clinician today.  If any of these changes or additions are incorrect, please notify your healthcare provider.             Verify that the below list of medications is an accurate representation of the medications you are currently taking.  If none reported, the list may be blank. If incorrect, please contact your healthcare provider. Carry this list with you in case of emergency.           Current Medications     amlodipine (NORVASC) 10 MG tablet Take 1 tablet (10 mg total) by mouth once daily.    metformin (FORTAMET) 500 mg 24 hr tablet Take 500 mg by mouth daily with breakfast.    olmesartan (BENICAR) 40 MG tablet     rosuvastatin (CRESTOR) 40 MG Tab Take 10 mg by mouth every evening.           Clinical Reference Information            Allergies as of 5/18/2017     No Known Allergies      Immunizations Administered on Date of Encounter - 5/18/2017     None      Instructions    Your eyes are very healthy; there are no signs ocular complications from diabetes.  Please continue working with your primary care doctor to manage the diabetes.  It is important you have your eyes checked every year to evaluate the health of your eyes. We will send you a reminder in 1 year for your next examination, or please contact us sooner if you need us.    Kathy Casas, OD        Language Assistance Services     ATTENTION: Language assistance services are available, free of charge. Please call [phone].      ATENCIÓN: Si marcla jaden, tiene a johansen disposición servicios gratuitos de asistencia lingüística. Llame al [phone].     CHÚ Ý: N?u b?n nói Ti?ng Vi?t, có các d?ch v? h? tr? ngôn ng? mi?n phí dành cho b?n. G?i s? [phone].         [facility] complies with applicable Federal civil rights laws and does not discriminate on the basis of race, color, national origin, age, disability, or sex.

## 2017-05-18 NOTE — LETTER
May 18, 2017      Obed Gallardo MD  [address]  [facility] [address]           [facility]  [address]  [facility] [address]  Phone: [phone]          Patient: Nain Jain   MR Number: [id number]   YOB: 1980   Date of Visit: 5/18/2017       Dear Dr. Obed Gallardo:    Thank you for referring Nain Jain to me for evaluation. Attached you will find relevant portions of my assessment and plan of care.    If you have questions, please do not hesitate to call me. I look forward to following Nain Jain along with you.    Sincerely,    Kathy Casas, OD    Enclosure  CC:  No Recipients    If you would like to receive this communication electronically, please contact [email or web address] or [phone] to request more information on [employer] access.    For providers and/or their staff who would like to refer a patient to [facility], please contact us through our one-stop-shop provider referral line, [facility] , at [phone].    If you feel you have received this communication in error or would no longer like to receive these types of communications, please e-mail [email or web address]

## 2017-05-18 NOTE — PROGRESS NOTES
HPI     Mr. Nain Jain was referred by Obed Gallardo MD for a diabetic   eye exam     Pt has no complaints about eyes or vision today, pt states he does not   wear any glasses. He reports clear vision all ranges without glasses and   declines refraction today.    (-)drops  (-)flashes  (-)floaters  (-)diplopia    Diabetic yes   Hemoglobin A1C       Date                     Value               Ref Range             Status                05/10/2017               6.5 (H)             4.5 - 6.2 %           Final              OCULAR HISTORY  Last Eye Exam 18 months ago   (-)eye surgery   H/o keratitis OD      FAMILY HISTORY  (-)Glaucoma none          Last edited by Kathy Casas, OD on 5/18/2017  1:11 PM.         Assessment /Plan     For exam results, see Encounter Report.    Type 2 diabetes mellitus without retinopathy   No retinopathy noted OU. Continue management of DM as directed by PCP. Monitor with DFE in 1 year.     Retinal lesion   Hypopigmented lesions superior-temporal to disc, possible slight elevation. Unknown etiology. Recommended Macula and Posterior Pole OCTs, pt agrees (unable to stay today, so scheduled for 05/26/17). Will call pt with results and refer to retina specialist if needed.   -     Posterior Segment OCT Retina-Both eyes; Future  -     Color Fundus Photography - OU - Both Eyes      RTC 05/26/17 for Macula and Posterior Pole OCTs  Next DFE due in 1 year (pending results of OCTs)

## 2017-05-19 ENCOUNTER — HOSPITAL ENCOUNTER (OUTPATIENT)
Dept: SLEEP MEDICINE | Facility: OTHER | Age: 37
Discharge: HOME | End: 2017-05-19
Attending: PSYCHIATRY & NEUROLOGY
Payer: COMMERCIAL

## 2017-05-19 DIAGNOSIS — G47.30 SLEEP APNEA, UNSPECIFIED TYPE: ICD-10-CM

## 2017-05-19 DIAGNOSIS — G47.33 OSA (OBSTRUCTIVE SLEEP APNEA): ICD-10-CM

## 2017-05-19 PROCEDURE — 95806 SLEEP STUDY UNATT&RESP EFFT: CPT | Mod: 26,,, | Performed by: PSYCHIATRY & NEUROLOGY

## 2017-05-19 PROCEDURE — 95800 SLP STDY UNATTENDED: CPT

## 2017-05-26 ENCOUNTER — CLINICAL SUPPORT (OUTPATIENT)
Dept: OPHTHALMOLOGY | Facility: CLINIC | Age: 37
End: 2017-05-26
Attending: OPTOMETRIST
Payer: COMMERCIAL

## 2017-05-26 DIAGNOSIS — H31.8 CHOROIDAL LESION: Primary | ICD-10-CM

## 2017-05-26 DIAGNOSIS — H35.9 RETINAL LESION: ICD-10-CM

## 2017-05-26 PROCEDURE — 92134 CPTRZ OPH DX IMG PST SGM RTA: CPT | Mod: S$GLB,,, | Performed by: OPTOMETRIST

## 2017-05-29 ENCOUNTER — TELEPHONE (OUTPATIENT)
Dept: OPTOMETRY | Facility: CLINIC | Age: 37
End: 2017-05-29

## 2017-05-29 DIAGNOSIS — H31.8 CHOROIDAL LESION: Primary | ICD-10-CM

## 2017-05-29 NOTE — TELEPHONE ENCOUNTER
Pt returned my call and I spoke with him directly regarding results of OCT.    OD normal, OS shows possible subretinal lesion. Recommended referral to retina specialist. Pt commented about the number of doctors appointments and referrals he has had since starting care at [facility] when all he originally came [facility] for was bariatric surgery. However, I explained to him the importance of definitively ruling out ocular pathology and he agreed to referral. Recommended he see retina specialist within 1-2 months.     Pt was scheduled with retina specialist on 07/03/17.    Kathy Casas, OD   5/29/2017

## 2017-05-29 NOTE — PROGRESS NOTES
"ADDENDUM 5/29/2017     Interpretation of Macula and Posterior Pole OCTs (05/26/17)  OD: . Normal foveal contour and macular morphology.  OS: . Normal foveal contour and macular morphology. In superior arcades (superior-temporal to macula) area of choroidal thinning/compression with associated anterior bulging of retina (overlying retina appears normal).     ASSESSMENT: Possible choroidal lesion OS.  PLAN: Will call pt with results and recommend referral to retina specialist (see Telephone Encounter 5/29/2017).    Kathy Casas, JENNIFER   5/29/2017     ------------------    ADDENDUM 7/3/2017     Pt had an appointment scheduled 7/3/2017 with retina specialist. However, pt cancelled this appointment because "My eyesight is fine. I don't want any further testing done."    Kathy Casas, OD   7/3/2017   "

## 2017-06-05 ENCOUNTER — TELEPHONE (OUTPATIENT)
Dept: SLEEP MEDICINE | Facility: CLINIC | Age: 37
End: 2017-06-05

## 2017-06-05 NOTE — TELEPHONE ENCOUNTER
----- Message from Chirag Cervantes sent at 6/5/2017  1:41 PM CDT -----  Contact: pt  x_ 1st Request   _ 2nd Request   _ 3rd Request     Who: CONSTANCE ROWLAND [[id number]]    Why: pt is requesting a call back in reference to getting his sleep study results.    What Number to Call Back: [phone]    When to Expect a call back: (Before the end of the day)   -- if call after 3:00 call back will be tomorrow.

## 2017-06-05 NOTE — TELEPHONE ENCOUNTER
Patient was advised sleep study results are not in yet. Once results come in someone from the sleep clinic will call and schedule sleep study results follow up appointment.

## 2017-06-06 ENCOUNTER — TELEPHONE (OUTPATIENT)
Dept: BARIATRICS | Facility: CLINIC | Age: 37
End: 2017-06-06

## 2017-06-09 ENCOUNTER — TELEPHONE (OUTPATIENT)
Dept: BARIATRICS | Facility: CLINIC | Age: 37
End: 2017-06-09

## 2017-06-12 ENCOUNTER — CLINICAL SUPPORT (OUTPATIENT)
Dept: BARIATRICS | Facility: CLINIC | Age: 37
End: 2017-06-12
Payer: COMMERCIAL

## 2017-06-12 VITALS — BODY MASS INDEX: 51.15 KG/M2 | WEIGHT: 315 LBS

## 2017-06-12 PROCEDURE — 99999 PR PBB SHADOW E&M-EST. PATIENT-LVL II: CPT | Mod: PBBFAC,,,

## 2017-06-12 NOTE — PATIENT INSTRUCTIONS
Nutrition Focus Goals:  1. Keep drinking 1-2 protein shakes a day.  2. Continue avoiding starchy carbohydrates.  3. On your off days, aim for some type of exercise or 10,000 steps.   4. Record some meals on your phone for review.      Ask Sleep study to be sent to the clinic :)    Keep up the great work!

## 2017-06-12 NOTE — PROGRESS NOTES
NUTRITION NOTE    Referring Physician: Todd Massey M.D.  Reason for MNT Referral: Medically Supervised Diet pending Gastric Sleeve    PAST MEDICAL HISTORY:    Patient presents for 2nd visit for MSD with 4 lb weight loss over the past month; -4 lb total weight loss by making numerous dietary and lifestyle changes.    Past Medical History:   Diagnosis Date    Diabetes mellitus     Hyperlipidemia     Hypertension     Sleep apnea        CLINICAL DATA:  36 y.o. male.    There were no vitals filed for this visit.    Current Weight: 346 lbs  Weight Change Since Initial Visit: -4 lbs  Ideal Body Weight: 153 lbs  BMI: 51.15  Weight at Initial Visit: 350 lbs     CURRENT DIET:  Regular diet.  Diet Recall: Food records are not present.    B: premier  Sn:premier protein bar  L: grilled chicken salad   D: baked fish + protein shake     Diet Includes: No fried foods, a lot of baked foods. No soda. A lot of water and Powerade Zero. A lot of salads. 2 protein shakes (premier shake) each day.   Meal Pattern: Improved.  Protein Supplements: 2 per day. Premier RTD and Premier powder.   Snacking: Adequate. Snacks include healthy choices.    Vegetables: Likes a variety. Eats daily.  Fruits: Likes a variety. Eats never.  Beverages: water and sugar-free beverages  Dining Out: Dining out: Weekly. Mostly fast food and restaurants.  Cooking at home: Daily. Mostly baked and grilled meat, fish and vegetables.    CURRENT EXERCISE:  None    Vitamins / Minerals / Herbs:   Now Francisco Javier Men's Multivitamin  Vitamin D 3,000 IU daily  [employer] Foods B-Complex     Food Allergies:   none    Social:  Works day and/or night shifts.  Alcohol: Cutting back.  Smoking: None.    ASSESSMENT:  Patient demonstrates willingness to change lifestyle habits as evidenced by weight loss, no exercise, no food logs, dietary changes, including protein drinks, increased fruits, increased vegetables, reduced dining out, better choices when dining out, more food  preparation at [employer] and healthier cooking at home.    Doing well with working on greatest challenges (dining out frequency, sweets, starchy CHO, portion control, irregular meal patterns, emotional eating and high-fat diet).    Adequate calorie intake.  Adequate protein intake.    PLAN:    Diet: Maintain diet plan.    Exercise: Maintain.    Behavior Modification: Continue to document food & activity logs daily. May use [employer] for documentation.     Weight loss prior to surgery (5-10% TBW): 18-35 lbs    Return to clinic in one month.    SESSION TIME:  30 minutes

## 2017-06-14 ENCOUNTER — TELEPHONE (OUTPATIENT)
Dept: SLEEP MEDICINE | Facility: CLINIC | Age: 37
End: 2017-06-14

## 2017-06-14 NOTE — TELEPHONE ENCOUNTER
"----- Message from Marleni Hernández MA sent at 6/14/2017 11:28 AM CDT -----  Contact: Patient himself  Pt advised note on desk   ----- Message -----  From: Skye Fountain  Sent: 6/14/2017   9:46 AM  To: Ne Flores Staff    _  1st Request  _  2nd Request  X  3rd Request    Who: Nain Jain (mrn# [id number])    Why: Patient called very upset because he has not received his sleep study results.  Says, "it is imperative he gets those result ASAP before his DOT card expires."  Please give a call back at your earliest convenience and advise.     THANKS!    What Number to Call Back:  [phone]    When to Expect a call back: (Before the end of the day)   -- if the call is after 12:00, the call back will be tomorrow.                          "

## 2017-06-26 ENCOUNTER — OFFICE VISIT (OUTPATIENT)
Dept: SLEEP MEDICINE | Facility: CLINIC | Age: 37
End: 2017-06-26
Payer: COMMERCIAL

## 2017-06-26 VITALS
HEIGHT: 69 IN | HEART RATE: 80 BPM | SYSTOLIC BLOOD PRESSURE: 136 MMHG | BODY MASS INDEX: 46.65 KG/M2 | DIASTOLIC BLOOD PRESSURE: 80 MMHG | OXYGEN SATURATION: 97 % | WEIGHT: 315 LBS

## 2017-06-26 DIAGNOSIS — G47.33 OSA (OBSTRUCTIVE SLEEP APNEA): Primary | ICD-10-CM

## 2017-06-26 DIAGNOSIS — E66.01 MORBID OBESITY WITH BMI OF 50.0-59.9, ADULT: ICD-10-CM

## 2017-06-26 PROCEDURE — 99214 OFFICE O/P EST MOD 30 MIN: CPT | Mod: S$GLB,,, | Performed by: NURSE PRACTITIONER

## 2017-06-26 PROCEDURE — 99999 PR PBB SHADOW E&M-EST. PATIENT-LVL III: CPT | Mod: PBBFAC,,, | Performed by: NURSE PRACTITIONER

## 2017-06-26 NOTE — PROGRESS NOTES
Nain Jain seen in follow-up to discuss sleep study results and potential treatment options. Last seen in clinic by Dr. Olivia 04/11/2017. This is his initial visit with me.                                  04/11/2017 Dr. Olivia:  INITIAL HISTORY OF PRESENT ILLNESS:  Nain Jain is a 36 y.o. male is here to be evaluated for a sleep disorder.       CHIEF COMPLAINT:      The patient's complaints include snoring and interrupted sleep. Denied fatigue, sleepiness, witnessed apneas.    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    Denies  dry mouth and sore throat  Denies nasal congestion   Denies  morning headaches  Denies  interrupted sleep  Denies frequent leg movements  Denies symptoms concerning for parasomnia    The ESS (Surprise Sleepiness Score) taken on initial visit is 1 /24    The patient never had tonsillectomy, adenoidectomy or UPPP      SLEEP ROUTINE AND LIFESTYLE:    Occupation:    Bed partner:      Time to bed - wake up time on a workday : 1 AM to 8 AM  Time to bed - wake up time on a day off: 11pm to  8AM  Sleep onset latency: 30 min  Disruptions or awakenings: 1  Time to fall back into sleep: 10 min   Perceived sleep quality: 4/5  Perceived total sleep time:  7  hours.  Daytime naps: 0    Exercise routine: sometimes      INTERVAL HISTORY:    06/26/2017 SONYA Jaeger NP: Discussed 05/19/2017 home sleep study results in detail. Patient complaints of snoring and interrupted sleep remain the same. Pt DOT certificate () expires 07/10/2017 and pt is eager to start PAP therapy soon, otherwise certificate won't be renewed.    Baseline Sleep Study: 05/19/2017  lb. The overall AHI was 30 and overall RDI was 35. The oxygen tahmina was 83.3% and % time < 90% SpO2 was 2.1%.       PAST MEDICAL HISTORY:    Active Ambulatory Problems     Diagnosis Date Noted    Morbid obesity with BMI of 50.0-59.9, adult 04/20/2017    Essential hypertension 04/20/2017     Diabetes mellitus type 2 in obese 04/20/2017    Hyperlipidemia 04/20/2017    SOB (shortness of breath) 04/20/2017    Controlled type 2 diabetes mellitus without complication, without long-term current use of insulin 05/15/2017    WALTER (obstructive sleep apnea)      Resolved Ambulatory Problems     Diagnosis Date Noted    No Resolved Ambulatory Problems     Past Medical History:   Diagnosis Date    Diabetes mellitus     Hyperlipidemia     Hypertension     Sleep apnea                 PAST SURGICAL HISTORY:    No past surgical history on file.      FAMILY HISTORY:                Family History   Problem Relation Age of Onset    Diabetes Mother     Obesity Mother     Hypertension Mother     Diabetes Maternal Grandmother     No Known Problems Father     No Known Problems Sister     No Known Problems Brother     No Known Problems Brother     No Known Problems Sister     No Known Problems Sister        SOCIAL HISTORY:          Tobacco:   History   Smoking Status    Never Smoker   Smokeless Tobacco    Never Used       alcohol use:    History   Alcohol Use    1.2 oz/week    2 Glasses of wine per week                   ALLERGIES:  Review of patient's allergies indicates:  No Known Allergies    CURRENT MEDICATIONS:    Current Outpatient Prescriptions   Medication Sig Dispense Refill    amlodipine (NORVASC) 10 MG tablet Take 1 tablet (10 mg total) by mouth once daily. 90 tablet 3    metformin (FORTAMET) 500 mg 24 hr tablet Take 500 mg by mouth daily with breakfast.      olmesartan (BENICAR) 40 MG tablet       rosuvastatin (CRESTOR) 40 MG Tab Take 10 mg by mouth every evening.       No current facility-administered medications for this visit.                       REVIEW OF SYSTEMS:   Sleep related symptoms as per HPI    Denies weight gain  Denies dyspnea  Denies palpitations  Denies acid reflux   Reports polyuria  Denies  mood disturbance  Denies  anemia  Denies  muscle pain  Denies  Gait  "imbalance    Otherwise, a balance of 10 systems reviewed is negative.    PHYSICAL EXAM:  /80 (BP Location: Right arm, Patient Position: Sitting, BP Method: Manual)   Pulse 80   Ht 5' 9" (1.753 m)   Wt (!) 156 kg (343 lb 14.7 oz)   SpO2 97%   BMI 50.79 kg/m²   GENERAL: Obese body habitus, well groomed.  HEENT:   HEENT:  Conjunctivae are non-erythematous; Pupils equal, round, and reactive to light; Nose is symmetrical; Nasal mucosa is pink and moist; Septum is midline; Inferior turbinates are hypertrophied; Nasal airflow is diminished on the right; Posterior pharynx is pink; Modified Mallampati:IV; Posterior palate is very low; Tonsils +2 - +3; Uvula is wide and elongated;Tongue is enlarged; Dentition is fair; No TMJ tenderness; Jaw opening and protrusion without click and without discomfort.  NECK: Supple. Neck circumference is 20 inches. No thyromegaly. No palpable nodes.     SKIN: On face and neck: No abrasions, no rashes, no lesions.  No subcutaneous nodules are palpable.  RESPIRATORY: Chest is clear to auscultation.  Normal chest expansion and non-labored breathing at rest.  CARDIOVASCULAR: Normal S1, S2.  No murmurs, gallops or rubs. No carotid bruits bilaterally.  No edema. No clubbing. No cyanosis.    NEURO: Oriented to time, place and person. Normal attention span and concentration. Gait normal.    PSYCH: Affect is full. Mood is normal  MUSCULOSKELETAL: Moves 4 extremities. Gait normal.       ASSESSMENT:    Obstructive sleep apnea, severe by AHI. The patient symptomatically has  snoring and interrupted sleep  with exam findings of "a crowded oral airway and elevated body mass index. Medical co-morbidities: HTN, DM2, HLD, morbid obesity. This warrants treatment. Ready to trial PAP therapy.     Morbid obesity, BMI 51.15      PLAN:    During our discussion today, we talked about the etiology of  WALTER as well as the potential ramifications of untreated sleep apnea, which could include daytime " sleepiness, hypertension, heart disease and/or stroke.  We discussed potential treatment options, which could include weight loss, body positioning, continuous positive airway pressure (CPAP), OA, EPAP,  or referral for surgical consideration. Meanwhile, he  is urged to avoid supine sleep, weight gain and alcoholic beverages since all of these can worsen WALTER.     Treatment: auto CPAP 8 - 20 cm. Pt needs to have compliance report by 7/10/17 in order to renew DOT certificate. Will see back in clinic 07/07/17 after CPAP set up either today or tomorrow at [facility], complete either specific DOT forms then or letter stating he has started PAP therapy and compliance so far. Then  RTC week of 08/07/17 for adherence monitoring.     Alternatively consider oral appliance fitted by a dentist specializing in these devices, or surgical consulation for uvulopalatopharyngoplasty (UPPP) for treatment of WALTER.   If patient has difficulty with CPAP adherence or ongoing WALTER symptoms or despite CPAP adherence, then consider an in-lab titration sleep study in order to determine optimal fixed CPAP setting.    Counseling regarding benefits of healthy eating and physical activity (150 minutes of moderate-intensity aerobic activity per week) for weight reduction which can improve overall health.     Precautions: The patient was advised to abstain from driving should he feel sleepy or drowsy.

## 2017-06-26 NOTE — PATIENT INSTRUCTIONS
Your prescription for CPAP has been sent through our system. You should receive a call from [facility] in the next few days regarding your CPAP set up.     For any questions regarding your machine or supplies, please contact [facility] at [phone] (Ext [phone]).

## 2017-06-27 ENCOUNTER — TELEPHONE (OUTPATIENT)
Dept: SLEEP MEDICINE | Facility: CLINIC | Age: 37
End: 2017-06-27

## 2017-06-27 NOTE — TELEPHONE ENCOUNTER
Spoke with pt, he stated that he was contacted by E and set to  CPAP for home use tomorrow 6/28/17

## 2017-06-27 NOTE — TELEPHONE ENCOUNTER
Please call pt to make sure that he got set up with [facility] for CPAP either yesterday or today.

## 2017-06-29 ENCOUNTER — TELEPHONE (OUTPATIENT)
Dept: SLEEP MEDICINE | Facility: CLINIC | Age: 37
End: 2017-06-29

## 2017-06-29 DIAGNOSIS — G47.33 OSA (OBSTRUCTIVE SLEEP APNEA): Primary | ICD-10-CM

## 2017-06-29 NOTE — TELEPHONE ENCOUNTER
----- Message from Britney Philip, CRT sent at 6/29/2017  8:53 AM CDT -----  Regarding: Britney Philip  Contact: [phone]  Good Morning! I set Mr. Jain up yesterday with his PAP unit and he just called me stating he is having issues with the FFM. He chose the Nina View mask, but he stated he is still feeling restricted. Can you put in a new RX for a nasal mask and I will fit him again. Thanks in advance.

## 2017-07-07 ENCOUNTER — OFFICE VISIT (OUTPATIENT)
Dept: SLEEP MEDICINE | Facility: CLINIC | Age: 37
End: 2017-07-07
Payer: COMMERCIAL

## 2017-07-07 ENCOUNTER — TELEPHONE (OUTPATIENT)
Dept: BARIATRICS | Facility: CLINIC | Age: 37
End: 2017-07-07

## 2017-07-07 VITALS
BODY MASS INDEX: 46.65 KG/M2 | SYSTOLIC BLOOD PRESSURE: 136 MMHG | HEART RATE: 73 BPM | HEIGHT: 69 IN | WEIGHT: 315 LBS | DIASTOLIC BLOOD PRESSURE: 76 MMHG

## 2017-07-07 DIAGNOSIS — G47.33 OSA (OBSTRUCTIVE SLEEP APNEA): Primary | ICD-10-CM

## 2017-07-07 PROCEDURE — 99999 PR PBB SHADOW E&M-EST. PATIENT-LVL III: CPT | Mod: PBBFAC,,, | Performed by: NURSE PRACTITIONER

## 2017-07-07 PROCEDURE — 99213 OFFICE O/P EST LOW 20 MIN: CPT | Mod: S$GLB,,, | Performed by: NURSE PRACTITIONER

## 2017-07-07 NOTE — PROGRESS NOTES
Nain Jain seen in follow-up for WALTER management and CPAP equipment check.     04/11/2017 Dr. Olivia:  INITIAL HISTORY OF PRESENT ILLNESS:  Nain Jain is a 36 y.o. male is here to be evaluated for a sleep disorder.       CHIEF COMPLAINT:      The patient's complaints include snoring and interrupted sleep. Denied fatigue, sleepiness, witnessed apneas.    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    Denies  dry mouth and sore throat  Denies nasal congestion   Denies  morning headaches  Denies  interrupted sleep  Denies frequent leg movements  Denies symptoms concerning for parasomnia    The ESS (Cromwell Sleepiness Score) taken on initial visit is 1 /24    The patient never had tonsillectomy, adenoidectomy or UPPP      SLEEP ROUTINE AND LIFESTYLE:    Occupation:    Bed partner:      Time to bed - wake up time on a workday : 1 AM to 8 AM  Time to bed - wake up time on a day off: 11pm to  8AM  Sleep onset latency: 30 min  Disruptions or awakenings: 1  Time to fall back into sleep: 10 min   Perceived sleep quality: 4/5  Perceived total sleep time:  7  hours.  Daytime naps: 0    Exercise routine: sometimes      INTERVAL HISTORY:    06/26/2017 SONYA Jaeger NP: Discussed 05/19/2017 home sleep study results in detail. Patient complaints of snoring and interrupted sleep remain the same. Pt DOT certificate () expires 07/10/2017 and pt is eager to start PAP therapy soon, otherwise certificate won't be renewed.    07/07/2017 SONYA Jaeger NP: Pt returns in short-interval f/u after set up of CPAP on 06/28/2017 because DOT certificate expiring 07/10/2017 and needs compliant report stating he has started PAP therapy and prove compliance. He is still acclimating to wearing mask at night, but finds nasal pillows mask relatively comfortable. Denies oral or nasal drying. Pressure is comfortable. He reports that sleep feels refreshing and he has improved energy. He has used CPAP  nightly at least 4 hours since set up.     CPAP Interrogation: DreamStation Auto CPAP 8 - 20. Set up 2017. Therapy hours: 46.2, Blower hours: 50.4, Days > 4 hours: , 7-day average: 5 hours. Mask fit: 99%, AHI (predicted): 4.8, Periodic breathin%, 90% pressure: 13.1. Machine condition: like new    Baseline Sleep Study: 2017  lb. The overall AHI was 30 and overall RDI was 35. The oxygen tahmina was 83.3% and % time < 90% SpO2 was 2.1%.       PAST MEDICAL HISTORY:    Active Ambulatory Problems     Diagnosis Date Noted    Morbid obesity with BMI of 50.0-59.9, adult 2017    Essential hypertension 2017    Diabetes mellitus type 2 in obese 2017    Hyperlipidemia 2017    SOB (shortness of breath) 2017    Controlled type 2 diabetes mellitus without complication, without long-term current use of insulin 05/15/2017    WALTER (obstructive sleep apnea)      Resolved Ambulatory Problems     Diagnosis Date Noted    No Resolved Ambulatory Problems     Past Medical History:   Diagnosis Date    Diabetes mellitus     Hyperlipidemia     Hypertension     Sleep apnea                 PAST SURGICAL HISTORY:    No past surgical history on file.      FAMILY HISTORY:                Family History   Problem Relation Age of Onset    Diabetes Mother     Obesity Mother     Hypertension Mother     Diabetes Maternal Grandmother     No Known Problems Father     No Known Problems Sister     No Known Problems Brother     No Known Problems Brother     No Known Problems Sister     No Known Problems Sister        SOCIAL HISTORY:          Tobacco:   History   Smoking Status    Never Smoker   Smokeless Tobacco    Never Used       alcohol use:    History   Alcohol Use    1.2 oz/week    2 Glasses of wine per week                   ALLERGIES:  Review of patient's allergies indicates:  No Known Allergies    CURRENT MEDICATIONS:    Current Outpatient Prescriptions   Medication Sig  "Dispense Refill    amlodipine (NORVASC) 10 MG tablet Take 1 tablet (10 mg total) by mouth once daily. 90 tablet 3    metformin (FORTAMET) 500 mg 24 hr tablet Take 500 mg by mouth daily with breakfast.      olmesartan (BENICAR) 40 MG tablet       rosuvastatin (CRESTOR) 40 MG Tab Take 10 mg by mouth every evening.       No current facility-administered medications for this visit.                       REVIEW OF SYSTEMS:   Sleep related symptoms as per HPI    Denies weight gain  Denies dyspnea  Denies palpitations  Denies acid reflux   Reports polyuria  Denies  mood disturbance  Denies  anemia  Denies  muscle pain  Denies  Gait imbalance    Otherwise, a balance of 10 systems reviewed is negative.    PHYSICAL EXAM:  /76 (BP Location: Left arm, Patient Position: Sitting, BP Method: Manual)   Pulse 73   Ht 5' 9" (1.753 m)   Wt (!) 162.1 kg (357 lb 5.9 oz)   BMI 52.77 kg/m²   GENERAL: Obese body habitus, well groomed.  HEENT:   HEENT:  Conjunctivae are non-erythematous; Pupils equal, round, and reactive to light; Nose is symmetrical; Nasal mucosa is pink and moist; Septum is midline; Inferior turbinates are hypertrophied; Nasal airflow is diminished on the right; Posterior pharynx is pink; Modified Mallampati:IV; Posterior palate is very low; Tonsils +2 - +3; Uvula is wide and elongated;Tongue is enlarged; Dentition is fair; No TMJ tenderness; Jaw opening and protrusion without click and without discomfort.  NECK: Supple. Neck circumference is 20 inches. No thyromegaly. No palpable nodes.     SKIN: On face and neck: No abrasions, no rashes, no lesions.  No subcutaneous nodules are palpable.  RESPIRATORY: Chest is clear to auscultation.  Normal chest expansion and non-labored breathing at rest.  CARDIOVASCULAR: Normal S1, S2.  No murmurs, gallops or rubs. No carotid bruits bilaterally.  No edema. No clubbing. No cyanosis.    NEURO: Oriented to time, place and person. Normal attention span and concentration. " Gait normal.    PSYCH: Affect is full. Mood is normal  MUSCULOSKELETAL: Moves 4 extremities. Gait normal.       ASSESSMENT:    Obstructive sleep apnea, severe by AHI. The patient symptomatically has  snoring and interrupted sleep now resolved with CPAP use. The patient is adherent on CPAP and experiencing symptomatic benefit. Medical co-morbidities: HTN, DM2, HLD, morbid obesity. This warrants treatment.    Morbid obesity, BMI 52.77      PLAN:    Treatment: continue auto CPAP 8 - 20 cm. RTC 08/08/2017 for adherence monitoring, then annually thereafter.     During our discussion today, we talked about the etiology of  WALTER as well as the potential ramifications of untreated sleep apnea, which could include daytime sleepiness, hypertension, heart disease and/or stroke.  We discussed potential treatment options, which could include weight loss, body positioning, continuous positive airway pressure (CPAP), OA, EPAP,  or referral for surgical consideration. Meanwhile, he  is urged to avoid supine sleep, weight gain and alcoholic beverages since all of these can worsen WALTER.     Alternatively consider oral appliance fitted by a dentist specializing in these devices, or surgical consulation for uvulopalatopharyngoplasty (UPPP) for treatment of WALTER.   If patient has difficulty with CPAP adherence or ongoing WALTER symptoms or despite CPAP adherence, then consider an in-lab titration sleep study in order to determine optimal fixed CPAP setting.    Counseling regarding benefits of healthy eating and physical activity (150 minutes of moderate-intensity aerobic activity per week) for weight reduction which can improve overall health.     Precautions: The patient was advised to abstain from driving should he feel sleepy or drowsy.

## 2017-07-07 NOTE — TELEPHONE ENCOUNTER
----- Message from Jeremy Kelly sent at 7/7/2017  9:10 AM CDT -----  Nicolas Scott would like to speak to you regarding his appt. Please call pt at [phone]

## 2017-07-07 NOTE — LETTER
[facility]  [address]  [facility] [address]  Phone: [phone]     July 7, 2017    RE:  Nain Jain      To whom it may concern:    I am writing this letter on behalf of my patient Nain Jain.  He has been diagnosed with obstructive sleep apnea (WALTER) associated with symptoms of snoring and interrupted sleep. WALTER was confirmed on his home sleep study on 05/19/2017.  His degree of sleep apnea falls within the severe severity category.  He was set up with CPAP therapy on 06/28/2017 and has been 100% compliant since. He reports restorative sleep and improved energy. He is due for another compliance follow-up with me on August 8, 2017.     If you have any questions or concerns, please contact my clinic.       Sincerely,      RAMAN Jaeger NP   Sleep Medicine - [facility]  [address]  [place] [address]

## 2017-07-10 ENCOUNTER — TELEPHONE (OUTPATIENT)
Dept: BARIATRICS | Facility: CLINIC | Age: 37
End: 2017-07-10

## 2017-07-10 ENCOUNTER — CLINICAL SUPPORT (OUTPATIENT)
Dept: BARIATRICS | Facility: CLINIC | Age: 37
End: 2017-07-10
Payer: COMMERCIAL

## 2017-07-10 VITALS — WEIGHT: 315 LBS | BODY MASS INDEX: 52.19 KG/M2

## 2017-07-10 NOTE — TELEPHONE ENCOUNTER
Left VM to call me. I wanted to discuss Tyler's note about his being upset regarding his having to return for more visits.

## 2017-07-10 NOTE — TELEPHONE ENCOUNTER
"----- Message from Tyler Alejandre RD sent at 7/10/2017 12:39 PM CDT -----  Patient came today for his third MSD required by his insurance. He gained 7 lbs in the last month and was eating starchy carbohydrates and popeyes again. When I told him that we would need to continue working on his diet prior to surgery clearance, he got very angry about the amount of appointments that he's had and that he did not want to continue to have to see a dietitian to get surgery. He was not receptive to discussions about lifestyle adoption and said "I'm getting mad, I have to leave" and left after 15 minutes. He was willing to schedule another appointment in 3 weeks but he wanted to know if it would be better for him to go to another clinic that didn't require so much from him. He's obviously not ready and was fairly rude to me.     "

## 2017-07-10 NOTE — PROGRESS NOTES
NUTRITION NOTE    Referring Physician: Todd Massey M.D.  Reason for MNT Referral: Medically Supervised Diet pending Gastric Sleeve    PAST MEDICAL HISTORY:    Patient presents for 3rd visit for MSD with weight {Weight change:38721} over the past month; total weight loss by making numerous dietary and lifestyle changes.    Past Medical History:   Diagnosis Date    Diabetes mellitus     Hyperlipidemia     Hypertension     Sleep apnea        CLINICAL DATA:  36 y.o. male.    There were no vitals filed for this visit.    Current Weight: *** lbs  Weight Change Since Initial Visit: *** lbs  Ideal Body Weight: 153 lbs  BMI: ***  Weight at Initial Visit: 350 lbs      CURRENT DIET:  {Diet Type:48239}  Diet Recall: Food records {Actions; are/are not:67702} present.    Diet Includes: ***  Meal Pattern: {Changes:27381}.  Protein Supplements: *** per day.  Snacking: {Adequate:37994}. Snacks include {Snacking Options:88328}.    Vegetables: Likes {Variety / Few / None:57895}. Eats {Frequency:80290}.  Fruits: Likes {Variety / Few / None:83337}. Eats {Frequency:00241}.  Beverages: {Beverages:68455}  Dining Out: Dining out: {Frequency:90842} Mostly {Dining out:16385}.  Cooking at home: {Frequency:78646} Mostly {Cookin} {Foods:64112}.    CURRENT EXERCISE:  {Adequate / Fair / None:89944}    Vitamins / Minerals / Herbs:   ***    Food Allergies:   ***    Social:  {Work :49494}  Alcohol: {Frequency:74076}.  Smoking: {Smokin}    ASSESSMENT:  Patient demonstrates {NO/SOME:03860} willingness to change lifestyle habits as evidenced by {Evidence of change:14517}.    Doing {DESC; WELL/FAIRLY WELL/POORLY:08917} with working on greatest challenges (***).    {Adequate:20806} calorie intake.  {Adequate:90807} protein intake.    PLAN:    Diet: {Maintain / Adjust:15093} diet plan.    Exercise: {Maintain / Increase / Decrease:88811}.    Behavior Modification: {Continue / Begin:91178} to document food & activity logs  daily.    Weight loss prior to surgery (5-10% TBW): *** lbs    Return to clinic in one month.    SESSION TIME:  {BAR DIETITIAN TIME:60071} minutes

## 2017-07-10 NOTE — PROGRESS NOTES
NUTRITION NOTE    Referring Physician: Todd Massey M.D.  Reason for MNT Referral: Medically Supervised Diet pending Gastric Sleeve    PAST MEDICAL HISTORY:    Patient presents for 3rd visit for [facility] with +7 lb weight gain over the past month; + 3 lb total weight gain by making numerous dietary and lifestyle changes. Patient eating lots of starchy carbohydrates and fast food this month. Patient adamant about receiving surgery date today. Discussed process of nutrition clearance. Patient reported that he thought that once he completed his three visits he would be done working with the dietitian. He expressed frustration over the amount of visits he has had to have in regards to bariatric surgery workup. Discussed the importance of adopting lifestyle changes prior to surgery for optimal success. Patient not interested at this time. States that he's mad and needs to leave. Patient left appointment after 15 minutes but was willing to schedule another appointment in 3 weeks.     Past Medical History:   Diagnosis Date    Diabetes mellitus     Hyperlipidemia     Hypertension     Sleep apnea        CLINICAL DATA:  36 y.o. male.    There were no vitals filed for this visit.    Current Weight: 353 lbs  Weight Change Since Initial Visit: +3 lbs  Ideal Body Weight: 153 lbs  BMI: 52.19  Weight at Initial Visit: 350 lbs    CURRENT DIET:  Regular diet.  Diet Recall: Food records are present.    Day #1  B: grits, eggs & woods  Sn: yogurt with cinnamon  L: turkey sandwich + chips  Sn: yogurt + cinnamon  D: Jesus [employer]'s chicken  Sn: yogurt + cinnamon    Day #2  B: captain crunch cereal  Sn: yogurt  L: pizza  Sn: yogurt  D: chicken + mac n' cheese  Sn: yogurt + cinnamon    Day #3  B: egg sandwich  Sn: yogurt + cinnamon  L: manwich sandwich  Sn: yogurt  D: red beans + rice & chicken  Sn: yogurt + cinnamon    Day #4  B: honey nut cereal  Sn: yogurt  L: turkey sandwich  Sn: yogurt  D: spaghetti & meatballs    Day #5  B: grits +  egg  Sn: yogurt  L: turkey sandwich  Sn: yogurt  D: spaghetti + meatballs  Sn: yogurt    Day #6  B: egg & rice  Sn: yogurt  L: fried rice  Sn: yogurt   D: red beans & rice    Day #7  B: cereal  SN: yogurt   L: chicken salad  Sn: yogurt  D: jambalaya + chicken  Sn: yogurt    Diet Includes: starchy carbohydrates, fast food, lean proteins, dairy products  Meal Pattern: Same.  Protein Supplements: 0-1 per day. Premier Protein powder.  Snacking: Adequate. Snacks include healthy choices.    Vegetables: Likes a variety. Eats once a week.  Fruits: Likes a variety. Eats once a week.  Beverages: Unable to assess as patient left appointment early.   Dining Out: Dining out: Weekly. Mostly fast food.  Cooking at home: Weekly. Mostly baked and grilled meat, starchy CHO and vegetables.    CURRENT EXERCISE:  Fair   Walking twice a week per food/activity logs.     Vitamins / Minerals / Herbs:   Unable to assess as patient left appointment early.     Food Allergies:   None     Social:  Works day and/or night shifts.  Alcohol: Unable to assess as patient left appointment early.   Smoking: None.    ASSESSMENT:  Patient demonstrates some willingness to change lifestyle habits as evidenced by weight gain, daily food logs, including protein drinks and healthier snacking at work.    Doing poorly with working on greatest challenges (dining out frequency, sweets, starchy CHO, portion control, irregular meal patterns, emotional eating and high-fat diet).    Excess calorie intake.  Inadequate protein intake.    PLAN:    Diet: Adjust diet plan.  Cut out starchy carbohydrates (bread, rice, pasta, potatoes, corn, peas, oatmeal, grits, tortillas, crackers, chips)  Continue to work towards eating  grams of protein daily.    Exercise: Increase.    Behavior Modification: Continue to document food & activity logs daily. Unable to give patient new foods logs as patient left appointment early.     Weight loss prior to surgery (5-10% TBW): 18-36  lbs    Return to clinic in 3 weeks.    SESSION TIME:  15 minutes

## 2017-07-11 NOTE — TELEPHONE ENCOUNTER
Discussed with patient his being upset at clinic with [facility]. I had a long talk with him and discussed the necessary changes he needs to make now because if not he will not be successful with his surgery. We discussed his diet, diet after surgery and in the end it's up to him to make the necessary life changes because if not the surgery won't help him. He understood and appreciated the info.  He states he was told he had 3 msd visits and he would get a surgery date at the end and this hasn't happened. He was told of the process and how it works and with the info in his chart the ins co wouldn't approve him because of the types of foods he's eating.  I looked through his chart and updated his db as well as it didn't reflect that the 2 yr wt hx had been scanned. He's aware the ins auth takes 2-6 weeks and we will set up the date once we get an auth.

## 2017-08-03 ENCOUNTER — CLINICAL SUPPORT (OUTPATIENT)
Dept: BARIATRICS | Facility: CLINIC | Age: 37
End: 2017-08-03
Payer: COMMERCIAL

## 2017-08-03 VITALS — WEIGHT: 315 LBS | BODY MASS INDEX: 52.12 KG/M2

## 2017-08-03 NOTE — PROGRESS NOTES
NUTRITION NOTE    Referring Physician: Todd Massey M.D.  Reason for MNT Referral: Medically Supervised Diet pending Gastric Sleeve    PAST MEDICAL HISTORY:    Patient presents for 4th visit for [facility] with -1 lb weight loss over the past month by making numerous dietary and lifestyle changes. Patient states that after his last appointment, he stopped eating starchy carbohydrates and avoids fruit due to raising his blood sugar. He is incorporating 2-3 premier protein shakes daily.     Past Medical History:   Diagnosis Date    Diabetes mellitus     Hyperlipidemia     Hypertension     Sleep apnea        CLINICAL DATA:  36 y.o. male.    There were no vitals filed for this visit.    Current Weight: 352 lbs  Weight Change Since Last Visit: -1 lbs  Ideal Body Weight: 160 lbs  BMI: 52.12  Weight at Initial Consult: 350 lbs  Last Visit: 353 lbs    CURRENT DIET:  Regular diet.  Diet Recall: Verbal recall.     B: scrambled eggs + grilled onions  Sn: premier protein   L: protein bar + protein shake  Sn: peanuts  D: baked fish + broccoli & asparagus    Drinking water, powerade zero and occasionally vitamin water zero.     Diet Includes: lean proteins, vegetables, protein shakes and bars. Avoiding fruits due to raising his blood sugar.   Meal Pattern: Improved.  Protein Supplements: 3 per day.  Snacking: Adequate. Snacks include healthy choices.    Vegetables: Likes a variety. Eats daily.  Fruits: Likes a variety. Eats rarely.  Beverages: water and sugar-free beverages  Powerade Zero  Dining Out: Dining out: Reduced lately. Mostly fast food.  Cooking at home: Daily. Mostly baked and grilled meat, fish and vegetables.    CURRENT EXERCISE:  Fair  Started cutting his grass himself. Plans to increase this month.     Vitamins / Minerals / Herbs:   Pablo Men's Multivitamin  Vitamin D3    B-complex     Food Allergies:   none    Social:  Works day and/or night shifts.  Alcohol: Socially. 2-3 glasses of red wine.   Smoking:  None.    ASSESSMENT:  Patient demonstrates willingness to change lifestyle habits as evidenced by weight loss, dietary changes, including protein drinks, increased fruits, increased vegetables, reduced dining out, more food preparation at home, healthier cooking at home, bringing snacks to work, healthier snacking at work and healthier snacking at home.    Doing well with working on greatest challenges (dining out frequency, sweets, starchy CHO, portion control, irregular meal patterns, emotional eating and high-fat diet).    Adequate calorie intake.  Adequate protein intake.    PLAN:    Diet: Maintain diet plan.  Continue drinking 1-2 shakes a day with mini protein meals.  Continue to avoid starchy carbohydrates (bread, rice, pasta, potatoes, corn, peas, oatmeal, grits, tortillas, crackers, chips)    Exercise: Maintain.    Behavior Modification: Continue to document food & activity logs daily.    Weight loss prior to surgery (5-10% TBW): 18-36 lbs    Patient is a good candidate for bariatric surgery--sleeve.     SESSION TIME:  30 minutes

## 2017-08-19 ENCOUNTER — HOSPITAL ENCOUNTER (EMERGENCY)
Facility: OTHER | Age: 37
Discharge: HOME | End: 2017-08-20
Attending: EMERGENCY MEDICINE
Payer: COMMERCIAL

## 2017-08-19 VITALS
DIASTOLIC BLOOD PRESSURE: 79 MMHG | HEIGHT: 69 IN | HEART RATE: 68 BPM | TEMPERATURE: 98 F | BODY MASS INDEX: 46.65 KG/M2 | OXYGEN SATURATION: 98 % | SYSTOLIC BLOOD PRESSURE: 140 MMHG | RESPIRATION RATE: 20 BRPM | WEIGHT: 315 LBS

## 2017-08-19 DIAGNOSIS — B00.52 HERPES KERATITIS: Primary | ICD-10-CM

## 2017-08-19 PROCEDURE — 25000003 PHARM REV CODE 250: Performed by: PHYSICIAN ASSISTANT

## 2017-08-19 PROCEDURE — 99283 EMERGENCY DEPT VISIT LOW MDM: CPT

## 2017-08-19 RX ORDER — PROPARACAINE HYDROCHLORIDE 5 MG/ML
2 SOLUTION/ DROPS OPHTHALMIC
Status: COMPLETED | OUTPATIENT
Start: 2017-08-19 | End: 2017-08-19

## 2017-08-19 RX ORDER — ACYCLOVIR 400 MG/1
400 TABLET ORAL
Qty: 35 TABLET | Refills: 0 | Status: SHIPPED | OUTPATIENT
Start: 2017-08-19 | End: 2017-08-22 | Stop reason: SDUPTHER

## 2017-08-19 RX ADMIN — PROPARACAINE HYDROCHLORIDE 2 DROP: 5 SOLUTION/ DROPS OPHTHALMIC at 11:08

## 2017-08-19 RX ADMIN — FLUORESCEIN SODIUM 1 STRIP: 1 STRIP OPHTHALMIC at 11:08

## 2017-08-20 NOTE — ED PROVIDER NOTES
"Encounter Date: 8/19/2017       History     Chief Complaint   Patient presents with    Conjunctivitis     history "herpes keratitis" every 6 months or so it acts up. 2 days of redness and itching to rt eye, needs acyclovir to clear it up     Patient is a 36 y.o. male with a past medical history of HTN, HLD, DM, presenting to the emergency department with complaints of pain and redness to the right eye.  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  He states that every 6-8 months, he has a flare during which he needs to take acyclovir by mouth.  He reports that he is now out of the medication and is currently having a flare.  He states it started yesterday.  He does report associated blurred vision which she states is consistent with his typical presentation.  He denies drainage from the eye.  He denies using contact lenses.  He states he is in the process of switching his care to [facility], and does not have an ophthalmologist that he sees.      The history is provided by the patient.     Review of patient's allergies indicates:  No Known Allergies  Past Medical History:   Diagnosis Date    Diabetes mellitus     Hyperlipidemia     Hypertension     Sleep apnea      History reviewed. No pertinent surgical history.  Family History   Problem Relation Age of Onset    Diabetes Mother     Obesity Mother     Hypertension Mother     Diabetes Maternal Grandmother     No Known Problems Father     No Known Problems Sister     No Known Problems Brother     No Known Problems Brother     No Known Problems Sister     No Known Problems Sister      Social History   Substance Use Topics    Smoking status: Never Smoker    Smokeless tobacco: Never Used    Alcohol use 1.2 oz/week     2 Glasses of wine per week     Review of Systems   Constitutional: Negative for activity change, chills, fatigue and fever.   HENT: Negative for congestion, rhinorrhea and sore " throat.    Eyes: Positive for pain, redness and visual disturbance. Negative for photophobia.   Respiratory: Negative for cough and shortness of breath.    Cardiovascular: Negative for chest pain.   Gastrointestinal: Negative for abdominal pain, diarrhea, nausea and vomiting.   Genitourinary: Negative for dysuria, hematuria and urgency.   Musculoskeletal: Negative for back pain, myalgias and neck pain.   Skin: Negative for color change and wound.   Neurological: Negative for weakness and headaches.   Psychiatric/Behavioral: Negative for agitation and confusion.       Physical Exam     Initial Vitals [08/19/17 2254]   BP Pulse Resp Temp SpO2   (!) 169/95 76 16 98.1 °F (36.7 °C) 98 %      MAP       119.67         Physical Exam    Nursing note and vitals reviewed.  Constitutional: He appears well-developed and well-nourished. He is not diaphoretic. He is cooperative.  Non-toxic appearance. He does not have a sickly appearance. He does not appear ill. No distress.   Well appearing, -American male unaccompanied in the emergency department.  He speaking clear and full sentences.  He is in no acute distress.   HENT:   Head: Normocephalic and atraumatic.   Right Ear: External ear normal.   Left Ear: External ear normal.   Nose: Nose normal.   Mouth/Throat: Oropharynx is clear and moist.   Eyes: EOM and lids are normal. Pupils are equal, round, and reactive to light. Right conjunctiva is injected.   Normal EOM. Examined using fluorescin dye with wood's lamp, increased uptake to the medial aspect.    Neck: Normal range of motion. Neck supple.   Cardiovascular: Normal rate, regular rhythm and normal heart sounds.   Pulmonary/Chest: Breath sounds normal. No respiratory distress. He has no wheezes.   Musculoskeletal: Normal range of motion.   Neurological: He is alert and oriented to person, place, and time. GCS eye subscore is 4. GCS verbal subscore is 5. GCS motor subscore is 6.   Skin: Skin is warm.   Psychiatric: He  has a normal mood and affect. His behavior is normal. Judgment and thought content normal.         ED Course   Procedures  Labs Reviewed - No data to display          Medical Decision Making:   Initial Assessment:   Urgent evaluation of a 36-year-old male past medical history of hypertension, hyperlipidemia, diabetes, herpetic keratosis, presenting to the emergency department with complaints of right eye pain and redness.  Patient is afebrile, nontoxic appearing, hemodynamically stable.  Physical exam reveals an injected right conjunctiva with normal extraocular movements, PERRLA.  Examined using fluorescin dye and Woods lamp, increased uptake noted on the medial aspect.    ED Management:  Visual acuity is assessed, 20/20 left eye, 20/30 right eye, 20/15 both. Given the patient's history and physical exam findings, I will provide him with a prescription for acyclovir.  I did explain to the patient the importance of obtaining close follow-up with both his primary care provider as well as his ophthalmologist.  He is counseled on symptomatic care and treatment.  He stable for discharge.  Other:   I have discussed this case with another health care provider.       <> Summary of the Discussion: Dr. Velasco  This note was created using Dragon Medical Dictation. There may be typographical errors secondary to dictation.                    ED Course     Clinical Impression:     1. Herpes keratitis         Disposition:   Disposition: Discharged  Condition: Stable                        Ladonna Mar PA-C  08/20/17 0101

## 2017-08-20 NOTE — ED NOTES
Pt has had redness to rt eye and itching since yesterday, diagnosed with herpes keratosis and get it about every 6 months. Needs acyclovir to clear it up.

## 2017-08-21 ENCOUNTER — OFFICE VISIT (OUTPATIENT)
Dept: INTERNAL MEDICINE | Facility: CLINIC | Age: 37
End: 2017-08-21
Payer: COMMERCIAL

## 2017-08-21 VITALS
HEIGHT: 69 IN | WEIGHT: 315 LBS | OXYGEN SATURATION: 97 % | DIASTOLIC BLOOD PRESSURE: 84 MMHG | HEART RATE: 76 BPM | SYSTOLIC BLOOD PRESSURE: 140 MMHG | BODY MASS INDEX: 46.65 KG/M2

## 2017-08-21 DIAGNOSIS — E11.9 CONTROLLED TYPE 2 DIABETES MELLITUS WITHOUT COMPLICATION, WITHOUT LONG-TERM CURRENT USE OF INSULIN: ICD-10-CM

## 2017-08-21 DIAGNOSIS — I10 ESSENTIAL HYPERTENSION: Primary | ICD-10-CM

## 2017-08-21 DIAGNOSIS — B00.52 HERPES KERATITIS: ICD-10-CM

## 2017-08-21 PROCEDURE — 99999 PR PBB SHADOW E&M-EST. PATIENT-LVL III: CPT | Mod: PBBFAC,,, | Performed by: INTERNAL MEDICINE

## 2017-08-21 PROCEDURE — 3008F BODY MASS INDEX DOCD: CPT | Mod: S$GLB,,, | Performed by: INTERNAL MEDICINE

## 2017-08-21 PROCEDURE — 4010F ACE/ARB THERAPY RXD/TAKEN: CPT | Mod: S$GLB,,, | Performed by: INTERNAL MEDICINE

## 2017-08-21 PROCEDURE — 3079F DIAST BP 80-89 MM HG: CPT | Mod: S$GLB,,, | Performed by: INTERNAL MEDICINE

## 2017-08-21 PROCEDURE — 3044F HG A1C LEVEL LT 7.0%: CPT | Mod: S$GLB,,, | Performed by: INTERNAL MEDICINE

## 2017-08-21 PROCEDURE — 3077F SYST BP >= 140 MM HG: CPT | Mod: S$GLB,,, | Performed by: INTERNAL MEDICINE

## 2017-08-21 PROCEDURE — 99214 OFFICE O/P EST MOD 30 MIN: CPT | Mod: S$GLB,,, | Performed by: INTERNAL MEDICINE

## 2017-08-21 RX ORDER — HYDROCHLOROTHIAZIDE 12.5 MG/1
12.5 TABLET ORAL DAILY
Qty: 90 TABLET | Refills: 1 | Status: SHIPPED | OUTPATIENT
Start: 2017-08-21 | End: 2018-01-31

## 2017-08-21 NOTE — PROGRESS NOTES
"Subjective:       Patient ID: Nain Jain is a 36 y.o. male.    Chief Complaint: Hypertension      Pt here for f/u after recent ED visit for herpes keratitis in R eye.  Since starting tx his blurry vision already '80%' improved.  He is not having any eye pain.      Pt has had similar episodes like this in the past and, as a , typically stays out of work 2-3d for this.           Review of Systems   Constitutional: Negative.    HENT: Negative.    Eyes: Negative.    Respiratory: Negative.        Objective:       Vitals:    08/21/17 1013   BP: (!) 140/84   Pulse: 76   SpO2: 97%   Weight: (!) 158.4 kg (349 lb 3.3 oz)   Height: 5' 9" (1.753 m)     Physical Exam   Constitutional: He is oriented to person, place, and time. He appears well-developed and well-nourished. No distress.   HENT:   Head: Normocephalic and atraumatic.   Eyes: Conjunctivae and EOM are normal. Pupils are equal, round, and reactive to light.   Neurological: He is alert and oriented to person, place, and time.   Skin: No rash noted. He is not diaphoretic.   Psychiatric: He has a normal mood and affect. His behavior is normal. Thought content normal.       Assessment:       1. Essential hypertension    2. Herpes keratitis    3. Controlled type 2 diabetes mellitus without complication, without long-term current use of insulin        Plan:           Herpes keratitis - Improving with acyclovir.  Cont this.  Pt advised to f/u with ophtho.    HTN - Above goal.  Cont current tx and start hctz.      DM2 - Controlled on metformin.  Cont this.       "

## 2017-08-22 ENCOUNTER — OFFICE VISIT (OUTPATIENT)
Dept: OPTOMETRY | Facility: CLINIC | Age: 37
End: 2017-08-22
Payer: COMMERCIAL

## 2017-08-22 DIAGNOSIS — B00.52 HERPES KERATITIS: Primary | ICD-10-CM

## 2017-08-22 PROCEDURE — 92012 INTRM OPH EXAM EST PATIENT: CPT | Mod: S$GLB,,, | Performed by: OPTOMETRIST

## 2017-08-22 PROCEDURE — 99999 PR PBB SHADOW E&M-EST. PATIENT-LVL II: CPT | Mod: PBBFAC,,, | Performed by: OPTOMETRIST

## 2017-08-22 RX ORDER — ACYCLOVIR 400 MG/1
400 TABLET ORAL 2 TIMES DAILY
Qty: 60 TABLET | Refills: 12 | Status: SHIPPED | OUTPATIENT
Start: 2017-08-22 | End: 2017-10-18 | Stop reason: SDUPTHER

## 2017-08-22 NOTE — PROGRESS NOTES
HPI     Mr. Nain Jain is here today for Herpes Keratitis OD follow up per   PCP    He was seen in ED on 08/19/17 for a recurrence of herpetic keratitis OD.   He was prescribed acyclovir 400mg 5x per day for 7 days which he started 2   days ago on 08/20/17. Since he started medication symptoms have fully   resolved.     Pt is also requesting completion of FMLA for his job to return to work   [place]. He saw PCP yesterday who recommended that optometry complete his   FMLA paperwork. Pt would like documentation for work stating that this   condition can flare up at any time and will prevent him from working (he   is a ).     Pt reports history of recurrent herpes keratitis OD. When flare-ups occur,   it is only the right eye that is affected. He experiences sudden onset of   blurred vision, eye pain, and light sensitivity. These symptoms usually   resolve after 3 days after starting oral acyclovir. He brought copies of   previous eye exam records documenting these flare-ups; these dates   include:   Jan. 5, 2016 at [facility]  Feb. 26, 2017 at [facility]   August 19, 2017 ED at [facility]     (-)drops  (-)flashes  (-)floaters  (-)diplopia     Diabetic yes   Hemoglobin A1C       Date                     Value               Ref Range             Status                05/10/2017               6.5 (H)             4.5 - 6.2 %           Final             ----------     OCULAR HISTORY  Last eye examination 05/18/17 with Dr. Casas  (-)eye surgery   H/o keratitis OD (01/05/16, 02/26/17, 08/19/17)  Choroidal lesion OS (first noted 05/2017, pt cancelled retina   consultation)     FAMILY HISTORY  (-)Glaucoma none     Last edited by Kathy Casas, OD on 8/22/2017 12:29 PM. (History)            Assessment /Plan     For exam results, see Encounter Report.    Herpes keratitis   Right eye. 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 Continue Acyclovir as prescribed (400mg  PO 5 times per day for 7 days), then continue Acyclovir 400mg PO BID x 1 year for prophylaxis. RTC if symptoms recur.   FMLA paperwork completed, copied, and sent for scanning. Copies of previous eye exams also copied and sent for scanning.    -     acyclovir (ZOVIRAX) 400 MG tablet; Take 1 tablet (400 mg total) by mouth 2 (two) times daily.  Dispense: 60 tablet; Refill: 12      Note: Reviewed photos and Macula OCT images from May 2017 with patient; again strongly recommended he see the retina specialist. Pt will be having bariatric surgery soon; he will call to schedule retina consult in the future.        RTC prn  Next annual diabetic DFE due in May 2018

## 2017-08-22 NOTE — PATIENT INSTRUCTIONS
Please continue the Acyclovir pills: 1 pill, 5 times per day, for 7 days.   After you complete the 7 days, then reduce: 1 pill (400mg) 2 times per day, for 1 year.     If you have any problems after hours or over the weekend, please call the [facility]  at [phone] and ask that the on-call Ophthalmologist be paged.

## 2017-08-25 ENCOUNTER — OFFICE VISIT (OUTPATIENT)
Dept: SLEEP MEDICINE | Facility: CLINIC | Age: 37
End: 2017-08-25
Payer: COMMERCIAL

## 2017-08-25 VITALS
SYSTOLIC BLOOD PRESSURE: 126 MMHG | DIASTOLIC BLOOD PRESSURE: 78 MMHG | BODY MASS INDEX: 46.65 KG/M2 | HEART RATE: 80 BPM | WEIGHT: 315 LBS | HEIGHT: 69 IN | OXYGEN SATURATION: 97 %

## 2017-08-25 DIAGNOSIS — R09.81 CHRONIC NASAL CONGESTION: ICD-10-CM

## 2017-08-25 DIAGNOSIS — G47.33 OSA (OBSTRUCTIVE SLEEP APNEA): Primary | ICD-10-CM

## 2017-08-25 DIAGNOSIS — E66.01 MORBID OBESITY WITH BMI OF 50.0-59.9, ADULT: ICD-10-CM

## 2017-08-25 PROCEDURE — 3008F BODY MASS INDEX DOCD: CPT | Mod: S$GLB,,, | Performed by: NURSE PRACTITIONER

## 2017-08-25 PROCEDURE — 3074F SYST BP LT 130 MM HG: CPT | Mod: S$GLB,,, | Performed by: NURSE PRACTITIONER

## 2017-08-25 PROCEDURE — 99214 OFFICE O/P EST MOD 30 MIN: CPT | Mod: S$GLB,,, | Performed by: NURSE PRACTITIONER

## 2017-08-25 PROCEDURE — 3078F DIAST BP <80 MM HG: CPT | Mod: S$GLB,,, | Performed by: NURSE PRACTITIONER

## 2017-08-25 PROCEDURE — 99999 PR PBB SHADOW E&M-EST. PATIENT-LVL III: CPT | Mod: PBBFAC,,, | Performed by: NURSE PRACTITIONER

## 2017-08-25 RX ORDER — MOMETASONE FUROATE 50 UG/1
2 SPRAY, METERED NASAL DAILY
Qty: 1 EACH | Refills: 11 | Status: SHIPPED | OUTPATIENT
Start: 2017-08-25 | End: 2017-10-18

## 2017-08-25 NOTE — PROGRESS NOTES
Nain Jain seen in follow-up for AWLTER management and CPAP equipment check.     04/11/2017 Dr. Olivia:  INITIAL HISTORY OF PRESENT ILLNESS:  Nain Jain is a 36 y.o. male is here to be evaluated for a sleep disorder.       CHIEF COMPLAINT:      The patient's complaints include snoring and interrupted sleep. Denied fatigue, sleepiness, witnessed apneas.    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    Denies  dry mouth and sore throat  Denies nasal congestion   Denies  morning headaches  Denies  interrupted sleep  Denies frequent leg movements  Denies symptoms concerning for parasomnia    The ESS (Nazareth Sleepiness Score) taken on initial visit is 1 /24    The patient never had tonsillectomy, adenoidectomy or UPPP      SLEEP ROUTINE AND LIFESTYLE:    Occupation:    Bed partner:      Time to bed - wake up time on a workday : 1 AM to 8 AM  Time to bed - wake up time on a day off: 11pm to  8AM  Sleep onset latency: 30 min  Disruptions or awakenings: 1  Time to fall back into sleep: 10 min   Perceived sleep quality: 4/5  Perceived total sleep time:  7  hours.  Daytime naps: 0    Exercise routine: sometimes      INTERVAL HISTORY:    06/26/2017 SONYA Jaeger NP: Discussed 05/19/2017 home sleep study results in detail. Patient complaints of snoring and interrupted sleep remain the same. Pt DOT certificate () expires 07/10/2017 and pt is eager to start PAP therapy soon, otherwise certificate won't be renewed.    07/07/2017 SONYA Jaeger NP: Pt returns in short-interval f/u after set up of CPAP on 06/28/2017 because DOT certificate expiring 07/10/2017 and needs compliant report stating he has started PAP therapy and prove compliance. He is still acclimating to wearing mask at night, but finds nasal pillows mask relatively comfortable. Denies oral or nasal drying. Pressure is comfortable. He reports that sleep feels refreshing and he has improved energy. He has used CPAP  nightly at least 4 hours since set up.     CPAP Interrogation: DreamStation Auto CPAP 8 - 20. Set up 2017. Therapy hours: 46.2, Blower hours: 50.4, Days > 4 hours: , 7-day average: 5 hours. Mask fit: 99%, AHI (predicted): 4.8, Periodic breathin%, 90% pressure: 13.1. Machine condition: like new    2017 SONYA Jaeger NP: Pt returns for adherence monitoring to meet insurance requirement. Previously seen in short-interval follow-up shortly after 2017 set up for DOT certificate renewal that was expiring 07/10/2017. Continues to use CPAP nightly. Denies breakthrough symptoms of snoring and interrupted sleep. Denies oral/nasal drying. Denies pressure intolerance. Reports occasional nasal congestion preventing adequate use of CPAP.  ESS 0. Awaiting bariatric surgery date for gastric sleeve at [facility].     CPAP Interrogation: DreamStation Auto CPAP 8  - 20 cm  Compliance Summary Days with Device Usage: 28 days Percentage of Days >=4 Hours: 80.0% Average Usage (Days Used): 5 hrs. 10 mins. 21 secs. Average Usage (All Days): 4 hrs. 49 mins. 39 secs.   Apnea Indices Average AHI: 2.0 Average OA Index: 0.8 Average CA Index: 0.1   Large Leak Average Time in Large Leak: 1 mins. 54 secs. Average % of Night in Large Leak: 0.6%   Periodic Breathing Average % of Night in PB: 0.2%   90%tile pressure: 14.8 cm     Baseline Sleep Study: 2017  lb. The overall AHI was 30 and overall RDI was 35. The oxygen tahmina was 83.3% and % time < 90% SpO2 was 2.1%.       PAST MEDICAL HISTORY:    Active Ambulatory Problems     Diagnosis Date Noted    Morbid obesity with BMI of 50.0-59.9, adult 2017    Essential hypertension 2017    Diabetes mellitus type 2 in obese 2017    Hyperlipidemia 2017    SOB (shortness of breath) 2017    Controlled type 2 diabetes mellitus without complication, without long-term current use of insulin 05/15/2017    WALTER (obstructive sleep apnea)      Resolved  Ambulatory Problems     Diagnosis Date Noted    No Resolved Ambulatory Problems     Past Medical History:   Diagnosis Date    Diabetes mellitus     Hyperlipidemia     Hypertension     Sleep apnea                 PAST SURGICAL HISTORY:    History reviewed. No pertinent surgical history.      FAMILY HISTORY:                Family History   Problem Relation Age of Onset    Diabetes Mother     Obesity Mother     Hypertension Mother     Diabetes Maternal Grandmother     No Known Problems Father     No Known Problems Sister     No Known Problems Brother     No Known Problems Brother     No Known Problems Sister     No Known Problems Sister        SOCIAL HISTORY:          Tobacco:   History   Smoking Status    Never Smoker   Smokeless Tobacco    Never Used       alcohol use:    History   Alcohol Use    1.2 oz/week    2 Glasses of wine per week                   ALLERGIES:  Review of patient's allergies indicates:  No Known Allergies    CURRENT MEDICATIONS:    Current Outpatient Prescriptions   Medication Sig Dispense Refill    acyclovir (ZOVIRAX) 400 MG tablet Take 1 tablet (400 mg total) by mouth 2 (two) times daily. 60 tablet 12    amlodipine (NORVASC) 10 MG tablet Take 1 tablet (10 mg total) by mouth once daily. 90 tablet 3    hydrochlorothiazide (HYDRODIURIL) 12.5 MG Tab Take 1 tablet (12.5 mg total) by mouth once daily. 90 tablet 1    metformin (FORTAMET) 500 mg 24 hr tablet Take 500 mg by mouth daily with breakfast.      olmesartan (BENICAR) 40 MG tablet       rosuvastatin (CRESTOR) 40 MG Tab Take 10 mg by mouth every evening.       No current facility-administered medications for this visit.                       REVIEW OF SYSTEMS:   Sleep related symptoms as per HPI    Denies weight gain  Denies dyspnea  Denies palpitations  Denies acid reflux   Reports polyuria  Denies  mood disturbance  Denies  anemia  Denies  muscle pain  Denies  Gait imbalance    Otherwise, a balance of 10 systems  reviewed is negative.    PHYSICAL EXAM:  There were no vitals taken for this visit.  GENERAL: Obese body habitus, well groomed.  HEENT:   HEENT:  Conjunctivae are non-erythematous; Pupils equal, round, and reactive to light; Nose is symmetrical; Nasal mucosa is pink and moist; Septum is midline; Inferior turbinates are hypertrophied; Nasal airflow is diminished on the right; Posterior pharynx is pink; Modified Mallampati:IV; Posterior palate is very low; Tonsils +2 - +3; Uvula is wide and elongated;Tongue is enlarged; Dentition is fair; No TMJ tenderness; Jaw opening and protrusion without click and without discomfort.  NECK: Supple. Neck circumference is 20 inches. No thyromegaly. No palpable nodes.     SKIN: On face and neck: No abrasions, no rashes, no lesions.  No subcutaneous nodules are palpable.  RESPIRATORY: Chest is clear to auscultation.  Normal chest expansion and non-labored breathing at rest.  CARDIOVASCULAR: Normal S1, S2.  No murmurs, gallops or rubs. No carotid bruits bilaterally.  No edema. No clubbing. No cyanosis.    NEURO: Oriented to time, place and person. Normal attention span and concentration. Gait normal.    PSYCH: Affect is full. Mood is normal  MUSCULOSKELETAL: Moves 4 extremities. Gait normal.       ASSESSMENT:    Obstructive sleep apnea, severe by AHI. The patient symptomatically has  snoring and interrupted sleep now resolved with CPAP use. The patient is adherent on CPAP and experiencing symptomatic benefit. Medical co-morbidities: HTN, DM2, HLD, morbid obesity. This warrants treatment.    Morbid obesity, BMI >50, having bariatric surgery within the next month, awaiting exact date    Nasal congestion, evaluated, not currently taking medication      PLAN:    Treatment: continue auto CPAP 8 - 20 cm. RTC 12 months for annual CPAP adherence monitoring, sooner if needed.   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   Start daily oral antihistamine (Allegra, Zyrtec, or Claritin) and Nasonex or Flonase (OTC), pt to price compare which nasal spray cost effective. May use only nasal spray for congestion post-bariatric surgery  Discussed re-qual sleep study after weight loss to re-eval WALTER and need for tx    During our discussion today, we talked about the etiology of  WALTER as well as the potential ramifications of untreated sleep apnea, which could include daytime sleepiness, hypertension, heart disease and/or stroke.  We discussed potential treatment options, which could include weight loss, body positioning, continuous positive airway pressure (CPAP), OA, EPAP,  or referral for surgical consideration. Meanwhile, he  is urged to avoid supine sleep, weight gain and alcoholic beverages since all of these can worsen WALTER.     Alternatively consider oral appliance fitted by a dentist specializing in these devices, or surgical consulation for uvulopalatopharyngoplasty (UPPP) for treatment of WALTER.   If patient has difficulty with CPAP adherence or ongoing WALTER symptoms or despite CPAP adherence, then consider an in-lab titration sleep study in order to determine optimal fixed CPAP setting.    Counseling regarding benefits of healthy eating and physical activity (150 minutes of moderate-intensity aerobic activity per week) for weight reduction which can improve overall health.     Precautions: The patient was advised to abstain from driving should he feel sleepy or drowsy.

## 2017-08-29 ENCOUNTER — DOCUMENTATION ONLY (OUTPATIENT)
Dept: BARIATRICS | Facility: CLINIC | Age: 37
End: 2017-08-29

## 2017-08-30 ENCOUNTER — TELEPHONE (OUTPATIENT)
Dept: BARIATRICS | Facility: CLINIC | Age: 37
End: 2017-08-30

## 2017-08-30 NOTE — TELEPHONE ENCOUNTER
Pt received letter in mail from ins. Advised him that when we receive we will call him to schedule

## 2017-08-30 NOTE — TELEPHONE ENCOUNTER
----- Message from Juan Mancilla sent at 8/30/2017 10:33 AM CDT -----  [phone]//pt states that he needs to speak with nurse in ref scheduling his surgery //please call//thank you

## 2017-08-30 NOTE — TELEPHONE ENCOUNTER
----- Message from Jeremy Kelly sent at 8/30/2017  1:44 PM CDT -----  Nicolas Barnhart needs to schedule surgery. Pt said he has received his approval letters and is ready to schedule surgery. Please call pt at [phone]

## 2017-09-20 ENCOUNTER — TELEPHONE (OUTPATIENT)
Dept: BARIATRICS | Facility: CLINIC | Age: 37
End: 2017-09-20

## 2017-09-20 NOTE — TELEPHONE ENCOUNTER
Returned patient call.  No answer.  Left message that I would review his chart and call him back to schedule surgery

## 2017-09-21 ENCOUNTER — TELEPHONE (OUTPATIENT)
Dept: BARIATRICS | Facility: CLINIC | Age: 37
End: 2017-09-21

## 2017-09-21 DIAGNOSIS — E11.8 TYPE 2 DIABETES MELLITUS WITH COMPLICATION, UNSPECIFIED LONG TERM INSULIN USE STATUS: ICD-10-CM

## 2017-09-21 DIAGNOSIS — G47.33 OSA ON CPAP: ICD-10-CM

## 2017-09-21 DIAGNOSIS — I10 ESSENTIAL HYPERTENSION: ICD-10-CM

## 2017-09-21 DIAGNOSIS — E78.5 HYPERLIPIDEMIA, UNSPECIFIED HYPERLIPIDEMIA TYPE: ICD-10-CM

## 2017-09-21 DIAGNOSIS — E66.01 MORBID OBESITY DUE TO EXCESS CALORIES: ICD-10-CM

## 2017-09-21 DIAGNOSIS — Z01.818 PRE-OP TESTING: ICD-10-CM

## 2017-09-21 DIAGNOSIS — Z98.84 STATUS POST LAPAROSCOPIC SLEEVE GASTRECTOMY: Primary | ICD-10-CM

## 2017-09-21 DIAGNOSIS — E66.01 MORBID OBESITY DUE TO EXCESS CALORIES: Primary | ICD-10-CM

## 2017-09-21 NOTE — TELEPHONE ENCOUNTER
Spoke with patient and scheduled preop appts/ surgery date/ post op appts. All dates and times agreed upon. Pt aware that must have PCP clearance within 30 days of surgery date signed and in chart for preop clearance. Pt aware that protein liquid diet start date is 10/10/17. Pt aware all appts can be seen in my [facility] patient portal at this time and appt reminders mailed to patient's address today by RN. Given office phone and fax number for any future questions/concerns.

## 2017-10-09 ENCOUNTER — OFFICE VISIT (OUTPATIENT)
Dept: INTERNAL MEDICINE | Facility: CLINIC | Age: 37
End: 2017-10-09
Payer: COMMERCIAL

## 2017-10-09 VITALS
SYSTOLIC BLOOD PRESSURE: 160 MMHG | BODY MASS INDEX: 46.65 KG/M2 | HEIGHT: 69 IN | DIASTOLIC BLOOD PRESSURE: 96 MMHG | HEART RATE: 80 BPM | WEIGHT: 315 LBS

## 2017-10-09 DIAGNOSIS — J06.9 VIRAL UPPER RESPIRATORY TRACT INFECTION: ICD-10-CM

## 2017-10-09 DIAGNOSIS — Z00.00 HEALTHCARE MAINTENANCE: Primary | ICD-10-CM

## 2017-10-09 DIAGNOSIS — I10 ESSENTIAL HYPERTENSION: ICD-10-CM

## 2017-10-09 DIAGNOSIS — B00.52 HERPES KERATITIS: ICD-10-CM

## 2017-10-09 DIAGNOSIS — E11.9 TYPE 2 DIABETES MELLITUS WITHOUT COMPLICATION, WITHOUT LONG-TERM CURRENT USE OF INSULIN: ICD-10-CM

## 2017-10-09 DIAGNOSIS — E66.01 MORBID OBESITY: ICD-10-CM

## 2017-10-09 DIAGNOSIS — R09.81 CHRONIC NASAL CONGESTION: ICD-10-CM

## 2017-10-09 DIAGNOSIS — E78.5 HYPERLIPIDEMIA, UNSPECIFIED HYPERLIPIDEMIA TYPE: ICD-10-CM

## 2017-10-09 DIAGNOSIS — J06.9 UPPER RESPIRATORY TRACT INFECTION, UNSPECIFIED TYPE: ICD-10-CM

## 2017-10-09 PROCEDURE — 99395 PREV VISIT EST AGE 18-39: CPT | Mod: S$GLB,,, | Performed by: INTERNAL MEDICINE

## 2017-10-09 PROCEDURE — 99999 PR PBB SHADOW E&M-EST. PATIENT-LVL III: CPT | Mod: PBBFAC,,, | Performed by: INTERNAL MEDICINE

## 2017-10-09 RX ORDER — BENZONATATE 100 MG/1
100 CAPSULE ORAL 3 TIMES DAILY PRN
Qty: 30 CAPSULE | Refills: 0 | Status: SHIPPED | OUTPATIENT
Start: 2017-10-09 | End: 2017-10-18 | Stop reason: ALTCHOICE

## 2017-10-09 RX ORDER — MOMETASONE FUROATE 50 UG/1
2 SPRAY, METERED NASAL DAILY
Qty: 1 EACH | Refills: 11 | Status: CANCELLED | OUTPATIENT
Start: 2017-10-09

## 2017-10-09 RX ORDER — METFORMIN HYDROCHLORIDE EXTENDED-RELEASE TABLETS 500 MG/1
500 TABLET, FILM COATED, EXTENDED RELEASE ORAL
Status: CANCELLED | OUTPATIENT
Start: 2017-10-09

## 2017-10-09 RX ORDER — AMLODIPINE BESYLATE 10 MG/1
10 TABLET ORAL DAILY
Qty: 90 TABLET | Refills: 3 | Status: CANCELLED | OUTPATIENT
Start: 2017-10-09 | End: 2018-10-09

## 2017-10-09 RX ORDER — ROSUVASTATIN CALCIUM 40 MG/1
10 TABLET, COATED ORAL NIGHTLY
Status: CANCELLED | OUTPATIENT
Start: 2017-10-09

## 2017-10-09 RX ORDER — LOSARTAN POTASSIUM 100 MG/1
100 TABLET ORAL DAILY
Qty: 30 TABLET | Refills: 11 | Status: SHIPPED | OUTPATIENT
Start: 2017-10-09 | End: 2018-01-31

## 2017-10-09 RX ORDER — OLMESARTAN MEDOXOMIL 40 MG/1
TABLET ORAL
Status: CANCELLED | OUTPATIENT
Start: 2017-10-09

## 2017-10-09 RX ORDER — ACYCLOVIR 400 MG/1
400 TABLET ORAL 2 TIMES DAILY
Qty: 60 TABLET | Refills: 12 | Status: CANCELLED | OUTPATIENT
Start: 2017-10-09 | End: 2017-11-08

## 2017-10-09 RX ORDER — HYDROCHLOROTHIAZIDE 12.5 MG/1
12.5 TABLET ORAL DAILY
Qty: 90 TABLET | Refills: 1 | Status: CANCELLED | OUTPATIENT
Start: 2017-10-09 | End: 2018-10-09

## 2017-10-09 NOTE — PATIENT INSTRUCTIONS
Hypertension:  Stop taking Benicar. Start taking losartan 100mg PO daily along with hydrochlorothiazide and norvasc.    Gastric Sleeve:  Bring in documents for medical clearance    Health Maintenance:   Flu shot today

## 2017-10-09 NOTE — PROGRESS NOTES
Subjective:       Patient ID: Nain Jain is a 36 y.o. male.    Chief Complaint: Establish Care    Mr. Jain is a 36y M w PMHx of DM2, HTN, HLD, morbid obesity here to establish care. Pt is scheduled for a gastric sleeve surgery on oct 24 and needs medical clearance. No hx of heart failure. 2D echo 5/2017 showed EF 60%, no diastolic dysfunction, no wall motion abn. Denies cp, orthopnea, PND, lower extremity edema.  Is complaining of a productive cough with greenish sputum, rhinorrhea and SOB since 4 days ago. Says his wife and step daughter have similar symptoms. Wife improved with dayquil. He has been taking dayquil as well. Denies fever, chills, nausea, vomiting, diarrhea or skin rash, wheezing or lightheadedness. DM2 is well controlled on metformin 500mg PO daily. Recent A1c (5/2017) was 6.5%,      Review of Systems   Constitutional: Positive for fatigue. Negative for activity change, appetite change, chills, diaphoresis, fever and unexpected weight change.   HENT: Positive for congestion and rhinorrhea. Negative for ear pain, facial swelling, mouth sores, sinus pain, sore throat and trouble swallowing.    Eyes: Positive for pain. Negative for visual disturbance.   Respiratory: Positive for cough and shortness of breath. Negative for chest tightness and wheezing.    Cardiovascular: Negative for chest pain, palpitations and leg swelling.   Gastrointestinal: Negative for abdominal pain, blood in stool, constipation, diarrhea, nausea and vomiting.   Endocrine: Negative for polydipsia, polyphagia and polyuria.   Genitourinary: Negative for dysuria, frequency and urgency.   Musculoskeletal: Negative for arthralgias, back pain and gait problem.   Skin: Negative for color change and rash.   Neurological: Negative for dizziness, light-headedness and headaches.   Psychiatric/Behavioral: Negative for confusion.       Objective:      Physical Exam   Constitutional: He is oriented to person, place, and time. He appears  well-developed. No distress.   obese   HENT:   Head: Normocephalic and atraumatic.   Nose: Nose normal.   Mouth/Throat: Oropharynx is clear and moist.   Eyes: Conjunctivae and EOM are normal. Pupils are equal, round, and reactive to light.   Neck: Normal range of motion. Neck supple.   Cardiovascular: Normal rate, regular rhythm, normal heart sounds and intact distal pulses.    No murmur heard.  Pulses:       Dorsalis pedis pulses are 2+ on the right side, and 2+ on the left side.        Posterior tibial pulses are 2+ on the right side, and 2+ on the left side.   Pulmonary/Chest: Effort normal and breath sounds normal. No respiratory distress. He has no wheezes. He has no rales.   Abdominal: Soft. Bowel sounds are normal. He exhibits no distension. There is no tenderness. There is no guarding.   Musculoskeletal: Normal range of motion. He exhibits no edema or tenderness.        Right foot: There is normal range of motion and no deformity.        Left foot: There is normal range of motion and no deformity.   Feet:   Right Foot:   Protective Sensation: 4 sites tested. 4 sites sensed.   Skin Integrity: Negative for ulcer, blister, skin breakdown, erythema, warmth, callus or dry skin.   Left Foot:   Protective Sensation: 4 sites tested. 4 sites sensed.   Skin Integrity: Negative for ulcer, blister, skin breakdown, erythema, warmth, callus or dry skin.   Lymphadenopathy:     He has no cervical adenopathy.   Neurological: He is alert and oriented to person, place, and time. No cranial nerve deficit.   Skin: Skin is warm. He is not diaphoretic.   Psychiatric: He has a normal mood and affect. His behavior is normal. Judgment and thought content normal.       Assessment:       1. Healthcare maintenance    2. Herpes keratitis    3. Chronic nasal congestion    4. Essential hypertension    5. Upper respiratory tract infection, unspecified type    6. Hyperlipidemia, unspecified hyperlipidemia type    7. Type 2 diabetes mellitus  without complication, without long-term current use of insulin    8. Morbid obesity        Plan:       Healthcare maintenance  -Flu shot today, administer in clinic    Herpes keratitis  - continue acyclovir  - continue to f/up with opthalmology    Chronic nasal congestion  -continue nasonex PRN     Upper respiratory infection  -likely viral vs bacterial   -will treat symptomatically with nasonex, tessalon pearls and OTC dayquil   -return to clinic if symptoms or worsen    WALTER  -CPAP qhs    Essential hypertension  -elevated today at 153/92 with manual repeat 160/96  -patient asymptomatic  -per clinic notes usually SBP equal or less than 140  -     losartan (COZAAR) 100 MG tablet; Take 1 tablet (100 mg total) by mouth once daily.  Dispense: 30 tablet; Refill: 11  - discontinue benicar 40mg PO daily  - continue norvasc 10mg PO daily and hydrochlotothiazide 12.5mg PO daily  - will re-evaluate in 6 mths after gastric sleeve surgery     Upper respiratory tract infection, unspecified type  -     benzonatate (TESSALON) 100 MG capsule; Take 1 capsule (100 mg total) by mouth 3 (three) times daily as needed for Cough.  Dispense: 30 capsule; Refill: 0    Hyperlipidemia, unspecified hyperlipidemia type  -continue crestor 10mg PO daily  -last lipid panel 5/2017    Type 2 diabetes mellitus without complication, without long-term current use of insulin  -well controlled  -HgA1c 6.5 on 5/2017  -continue metformin 500mg PO daily; well tolerated    Morbid obesity  Plan for gastric sleeve surgery on 10/24/17    Surgical Preoperative Risk Assessment:   Active cardiac issues:  Active decompensated heart failure? No   Unstable angina?  No   Significant uncontrolled arrhythmias? No   Severe valvular heart disease-Aortic or Mitral Stenosis? No   Recent MI or coronary revascularization < 30 days? No     Cardiac Risk Factors  High risk surgery? Yes   History of CAD/ischemic heart disease? No   History of cerebrovascular disease? No   History  of compensated heart failure? No   Type 2 diabetes requiring insulin? No   Serum Creatinine > 2? No   Total cardiac risk factors 1     Functional mets >4    < 4 METs -unable to walk > 2 blocks on level ground without stopping due to symptoms  - eating, dressing, toileting, walking indoors, light housework. POOR   > 4 METs -climbing > 1 flight of stairs without stopping  -walking up hill > 1-2 blocks  -scrubbing floors  -moving furniture  - golf, bowling, dancing or tennis  -running short distance MODERATE to EXCELLENT   Cleared for surgery.  Patient is moderate-excellent risk for surgery.    Case discussed with Dr. Borrero.  Candice Alarcon PGY-2  Internal Medicine  RTC in 3 mths to follow-up on BP, morbid obesity s/p gastric sleeve, DM2

## 2017-10-10 ENCOUNTER — TELEPHONE (OUTPATIENT)
Dept: BARIATRICS | Facility: CLINIC | Age: 37
End: 2017-10-10

## 2017-10-10 NOTE — TELEPHONE ENCOUNTER
----- Message from Shane To LPN sent at 9/21/2017  2:56 PM CDT -----  Regarding: protein liquid diet  2 week liquid diet starts 10/10/17  sleeve surgery scheduled 10/24/17  preop appt scheduled 10/18/17

## 2017-10-10 NOTE — TELEPHONE ENCOUNTER
Called patient to discuss liquid diet and vitamins.    Pt using Premier Protein RTD    Discussion:  -  gms of protein per day  - 600-800 calories per day   - Less than 4gm sugar per shake  - SF, Decaf, non-carbonated Fluids  - No Fruits, juices, yogurt or pudding on liquid diet  - No vitamins or minerals for 1 week prior to surgery    Reminded pt of pre-op and surgery dates.    Pt to call back with any questions.

## 2017-10-17 RX ORDER — POLYETHYLENE GLYCOL 3350 17 G/17G
17 POWDER, FOR SOLUTION ORAL DAILY
Qty: 1 BOTTLE | Refills: 3 | Status: SHIPPED | OUTPATIENT
Start: 2017-10-17 | End: 2018-01-31

## 2017-10-17 RX ORDER — ONDANSETRON 8 MG/1
8 TABLET, ORALLY DISINTEGRATING ORAL EVERY 6 HOURS PRN
Qty: 30 TABLET | Refills: 0 | Status: SHIPPED | OUTPATIENT
Start: 2017-10-17 | End: 2018-01-31

## 2017-10-17 RX ORDER — OMEPRAZOLE 40 MG/1
40 CAPSULE, DELAYED RELEASE ORAL EVERY MORNING
Qty: 30 CAPSULE | Refills: 2 | Status: SHIPPED | OUTPATIENT
Start: 2017-10-17 | End: 2018-01-31

## 2017-10-17 RX ORDER — PROMETHAZINE HYDROCHLORIDE 25 MG/1
SUPPOSITORY RECTAL
Qty: 15 SUPPOSITORY | Refills: 0 | Status: SHIPPED | OUTPATIENT
Start: 2017-10-17 | End: 2018-01-31

## 2017-10-17 RX ORDER — URSODIOL 500 MG/1
TABLET, FILM COATED ORAL
Qty: 90 TABLET | Refills: 1 | Status: SHIPPED | OUTPATIENT
Start: 2017-10-17 | End: 2018-01-31

## 2017-10-18 ENCOUNTER — HOSPITAL ENCOUNTER (OUTPATIENT)
Dept: CARDIOLOGY | Facility: CLINIC | Age: 37
Discharge: HOME | End: 2017-10-18
Payer: COMMERCIAL

## 2017-10-18 ENCOUNTER — DOCUMENTATION ONLY (OUTPATIENT)
Dept: BARIATRICS | Facility: CLINIC | Age: 37
End: 2017-10-18

## 2017-10-18 ENCOUNTER — OFFICE VISIT (OUTPATIENT)
Dept: BARIATRICS | Facility: CLINIC | Age: 37
End: 2017-10-18
Payer: COMMERCIAL

## 2017-10-18 ENCOUNTER — LAB VISIT (OUTPATIENT)
Dept: LAB | Facility: HOSPITAL | Age: 37
End: 2017-10-18
Attending: SURGERY
Payer: COMMERCIAL

## 2017-10-18 VITALS
DIASTOLIC BLOOD PRESSURE: 89 MMHG | HEIGHT: 70 IN | WEIGHT: 315 LBS | BODY MASS INDEX: 45.1 KG/M2 | SYSTOLIC BLOOD PRESSURE: 143 MMHG | HEART RATE: 68 BPM

## 2017-10-18 DIAGNOSIS — G47.33 OSA ON CPAP: ICD-10-CM

## 2017-10-18 DIAGNOSIS — E11.8 TYPE 2 DIABETES MELLITUS WITH COMPLICATION, UNSPECIFIED LONG TERM INSULIN USE STATUS: ICD-10-CM

## 2017-10-18 DIAGNOSIS — E78.5 HYPERLIPIDEMIA, UNSPECIFIED HYPERLIPIDEMIA TYPE: ICD-10-CM

## 2017-10-18 DIAGNOSIS — E66.01 MORBID OBESITY DUE TO EXCESS CALORIES: ICD-10-CM

## 2017-10-18 DIAGNOSIS — I10 ESSENTIAL HYPERTENSION: ICD-10-CM

## 2017-10-18 DIAGNOSIS — Z01.818 PRE-OP TESTING: ICD-10-CM

## 2017-10-18 DIAGNOSIS — E66.01 MORBID OBESITY WITH BMI OF 50.0-59.9, ADULT: Primary | ICD-10-CM

## 2017-10-18 DIAGNOSIS — G47.33 OSA (OBSTRUCTIVE SLEEP APNEA): ICD-10-CM

## 2017-10-18 DIAGNOSIS — E11.9 TYPE 2 DIABETES MELLITUS WITHOUT COMPLICATION, WITHOUT LONG-TERM CURRENT USE OF INSULIN: ICD-10-CM

## 2017-10-18 LAB
ALBUMIN SERPL BCP-MCNC: 3.3 G/DL
ALP SERPL-CCNC: 93 U/L
ALT SERPL W/O P-5'-P-CCNC: 30 U/L
ANION GAP SERPL CALC-SCNC: 10 MMOL/L
AST SERPL-CCNC: 19 U/L
BASOPHILS # BLD AUTO: 0.05 K/UL
BASOPHILS NFR BLD: 0.7 %
BILIRUB SERPL-MCNC: 0.4 MG/DL
BUN SERPL-MCNC: 11 MG/DL
CALCIUM SERPL-MCNC: 9.2 MG/DL
CHLORIDE SERPL-SCNC: 105 MMOL/L
CO2 SERPL-SCNC: 26 MMOL/L
CREAT SERPL-MCNC: 0.9 MG/DL
DIFFERENTIAL METHOD: ABNORMAL
EOSINOPHIL # BLD AUTO: 0.5 K/UL
EOSINOPHIL NFR BLD: 7 %
ERYTHROCYTE [DISTWIDTH] IN BLOOD BY AUTOMATED COUNT: 12.5 %
EST. GFR  (AFRICAN AMERICAN): >60 ML/MIN/1.73 M^2
EST. GFR  (NON AFRICAN AMERICAN): >60 ML/MIN/1.73 M^2
ESTIMATED AVG GLUCOSE: 140 MG/DL
GLUCOSE SERPL-MCNC: 127 MG/DL
HBA1C MFR BLD HPLC: 6.5 %
HCT VFR BLD AUTO: 35.9 %
HGB BLD-MCNC: 12 G/DL
IMM GRANULOCYTES # BLD AUTO: 0.02 K/UL
IMM GRANULOCYTES NFR BLD AUTO: 0.3 %
LYMPHOCYTES # BLD AUTO: 3.6 K/UL
LYMPHOCYTES NFR BLD: 49.5 %
MCH RBC QN AUTO: 30 PG
MCHC RBC AUTO-ENTMCNC: 33.4 G/DL
MCV RBC AUTO: 90 FL
MONOCYTES # BLD AUTO: 0.6 K/UL
MONOCYTES NFR BLD: 8.6 %
NEUTROPHILS # BLD AUTO: 2.4 K/UL
NEUTROPHILS NFR BLD: 33.9 %
NRBC BLD-RTO: 0 /100 WBC
PLATELET # BLD AUTO: 368 K/UL
PMV BLD AUTO: 9.3 FL
POTASSIUM SERPL-SCNC: 3.9 MMOL/L
PROT SERPL-MCNC: 7.4 G/DL
RBC # BLD AUTO: 4 M/UL
SODIUM SERPL-SCNC: 141 MMOL/L
WBC # BLD AUTO: 7.17 K/UL

## 2017-10-18 PROCEDURE — 93000 ELECTROCARDIOGRAM COMPLETE: CPT | Mod: S$GLB,,, | Performed by: INTERNAL MEDICINE

## 2017-10-18 PROCEDURE — 85025 COMPLETE CBC W/AUTO DIFF WBC: CPT

## 2017-10-18 PROCEDURE — 80053 COMPREHEN METABOLIC PANEL: CPT

## 2017-10-18 PROCEDURE — 36415 COLL VENOUS BLD VENIPUNCTURE: CPT

## 2017-10-18 PROCEDURE — 83036 HEMOGLOBIN GLYCOSYLATED A1C: CPT

## 2017-10-18 PROCEDURE — 99213 OFFICE O/P EST LOW 20 MIN: CPT | Mod: S$GLB,,, | Performed by: SURGERY

## 2017-10-18 PROCEDURE — 99999 PR PBB SHADOW E&M-EST. PATIENT-LVL III: CPT | Mod: PBBFAC,,, | Performed by: SURGERY

## 2017-10-18 RX ORDER — HEPARIN SODIUM 5000 [USP'U]/ML
5000 INJECTION, SOLUTION INTRAVENOUS; SUBCUTANEOUS ONCE
Status: CANCELLED | OUTPATIENT
Start: 2017-10-18 | End: 2017-10-18

## 2017-10-18 RX ORDER — ACYCLOVIR 400 MG/1
400 TABLET ORAL 2 TIMES DAILY
COMMUNITY
End: 2018-07-24 | Stop reason: SDUPTHER

## 2017-10-18 RX ORDER — HYDROCODONE BITARTRATE AND ACETAMINOPHEN 7.5; 325 MG/15ML; MG/15ML
15 SOLUTION ORAL 4 TIMES DAILY PRN
Qty: 473 ML | Refills: 0 | Status: SHIPPED | OUTPATIENT
Start: 2017-10-18 | End: 2018-01-31

## 2017-10-18 RX ORDER — FAMOTIDINE 10 MG/ML
20 INJECTION INTRAVENOUS ONCE
Status: CANCELLED | OUTPATIENT
Start: 2017-10-18 | End: 2017-10-18

## 2017-10-18 RX ORDER — METOCLOPRAMIDE HYDROCHLORIDE 5 MG/ML
10 INJECTION INTRAMUSCULAR; INTRAVENOUS ONCE
Status: CANCELLED | OUTPATIENT
Start: 2017-10-18 | End: 2017-10-18

## 2017-10-18 RX ORDER — SODIUM CITRATE AND CITRIC ACID MONOHYDRATE 334; 500 MG/5ML; MG/5ML
30 SOLUTION ORAL ONCE
Status: CANCELLED | OUTPATIENT
Start: 2017-10-18 | End: 2017-10-18

## 2017-10-18 RX ORDER — MUPIROCIN 20 MG/G
OINTMENT TOPICAL
Status: CANCELLED | OUTPATIENT
Start: 2017-10-18

## 2017-10-18 NOTE — PROGRESS NOTES
Subjective:  The patient is a 37 y.o. obese male who presents for pre op for gastric sleeve surgery.  The patient has tried diet modification and juicing.  The most weight lost was 10 lbs with diet modification.  He has been overweight since his late 20's after a divorce.  His highest weight is his current weight, 350 lbs.  He has already cut out fast food and beer.  He is not able to be as active as he wants.  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  He eats very large portionS.   All workup has been reviewed in clinic today and there is nothing on the review that would prevent us from proceeding with surgery.  All questions were answered in clinic today prior to leaving.  Body mass index is 50.23 kg/m².       Patient Active Problem List    Diagnosis Date Noted    Herpes keratitis 10/09/2017    Morbid obesity 10/09/2017    WALTER (obstructive sleep apnea)     Type 2 diabetes mellitus without complication, without long-term current use of insulin 05/15/2017    Morbid obesity with BMI of 50.0-59.9, adult 04/20/2017    Essential hypertension 04/20/2017    Diabetes mellitus type 2 in obese 04/20/2017    Hyperlipidemia 04/20/2017    SOB (shortness of breath) 04/20/2017     Past Medical History:   Diagnosis Date    BMI 50.0-59.9, adult     Diabetes mellitus     Hyperlipidemia     Hypertension     Morbid obesity     Sleep apnea       History reviewed. No pertinent surgical history.     (Not in a hospital admission)  Review of patient's allergies indicates:  No Known Allergies   Social History   Substance Use Topics    Smoking status: Never Smoker    Smokeless tobacco: Never Used    Alcohol use No      Family History   Problem Relation Age of Onset    Diabetes Mother     Obesity Mother     Hypertension Mother     Diabetes Maternal Grandmother     No Known Problems Father     No Known Problems Sister     No Known Problems Brother     No Known Problems  "Brother     No Known Problems Sister     No Known Problems Sister         Review of Systems  Constitutional: negative for anorexia, chills and fatigue  Eyes: negative for icterus, irritation and redness  Respiratory: negative for cough and dyspnea on exertion  Cardiovascular: negative for chest pain and chest pressure/discomfort  Gastrointestinal: negative for abdominal pain, change in bowel habits, constipation and diarrhea  Musculoskeletal:negative for arthralgias and back pain  Neurological: negative for coordination problems and dizziness  Behavioral/Psych: negative for anxiety and bad mood    Objective:  Vital signs in last 24 hours:  Vitals:    10/18/17 0731   BP: (!) 143/89   BP Location: Left arm   Patient Position: Sitting   BP Method: Large (Automatic)   Pulse: 68   Weight: (!) 158.8 kg (350 lb 1.5 oz)   Height: 5' 10" (1.778 m)       General appearance: alert, appears stated age and cooperative  Head: Normocephalic, without obvious abnormality, atraumatic  Eyes: negative findings: lids and lashes normal and conjunctivae and sclerae normal  Neck: supple, symmetrical, trachea midline and thyroid not enlarged, symmetric, no tenderness/mass/nodules  Lungs: clear to auscultation bilaterally  Heart: regular rate and rhythm, S1, S2 normal, no murmur, click, rub or gallop  Abdomen: soft, non-tender; bowel sounds normal; no masses,  no organomegaly  Extremities: extremities normal, atraumatic, no cyanosis or edema  Skin: Skin color, texture, turgor normal. No rashes or lesions  Neurologic: Grossly normal    Data Review:  Psych: Cleared  Nutrition: Cleared  PCP: Cleared  CXR: WNL  EKG: WNL  Sleep Study: WALTER  Stress: WNL  Labs: No abnormalities that would prevent proceeding with surgery.    Assessment/Plan:  Morbid obesity with failure of conservative therapy.    The patient was informed that risks include, but are not limited to: death, leak, obstruction, bleeding, and sepsis. Any of these could require further " surgery. Other risks include DVT, PE, pneumonia, wound dehiscence, hernia, wound infection, the need for dilatations and the inability to lose appropriate weight and keep it off.     We discussed that our goal is to ameliorate his medical problems and not to obtain a specific body mass index. He understands the risks and benefits and wishes to proceed with the procedure. He has signed a consent form.       Todd Massey MD

## 2017-10-18 NOTE — PROGRESS NOTES
Pre-op for Gastric sleeve:     Provided written/verbal information on pre- and post-bariatric surgery diet.  Reviewed pre-op liquid diet instructions.  Explained diet progression, stressing the importance of meeting protein & fluid needs.  Instructed on the importance of keeping a journal in order to monitor intake.  Provided and reviewed list of multivitamin/mineral supplementation to prevent deficiencies.  Provided written material and phone number to contact if any further questions.     Protein Powder/Shakes:  Premier Protein RTD, Body Fortress Protein Powder     Vitamins: Lion-melts MVI  Lion-melts Iron+Vitamin C  Lion-melts Vitamin B-12 1,000 mcg    Ordered SOM coenzyme B-complex and Calcet Calcium Citrate+Vit D-3       Comprehension:  Patient with good comprehension as evidenced by appropriate questions and concern expressed.

## 2017-10-19 NOTE — PROGRESS NOTES
I have personally taken the history and examined this patient and agree with the resident's note as stated above with the following thoughts:    continue CPAP.  DM seems well controled.

## 2017-10-23 ENCOUNTER — TELEPHONE (OUTPATIENT)
Dept: BARIATRICS | Facility: CLINIC | Age: 37
End: 2017-10-23

## 2017-10-23 ENCOUNTER — TELEPHONE (OUTPATIENT)
Dept: INTERNAL MEDICINE | Facility: CLINIC | Age: 37
End: 2017-10-23

## 2017-10-23 NOTE — PRE-PROCEDURE INSTRUCTIONS
Phone call to pt today with pre-op instructions given including NPO after MN except water up until 2 hrs before surgery time (per bariatric clinic), medications to take/hold the morning of surgery, arrival procedure and location, shower with antibacterial soap. Pts questions answered and concerns addressed; verbalized understanding.

## 2017-10-23 NOTE — TELEPHONE ENCOUNTER
Gave patient info regarding check in time and he understands he needs to be checked in by 0500. Reminded him of Hibiclens showers tonight and in am and his 8 oz glass of water. Pre op had gone over his meds.

## 2017-10-23 NOTE — TELEPHONE ENCOUNTER
Pt has appt w/ me for hosp f/u on 10/31/17. Please clarify. Pt likely should f/u w/ his PCP for hospital follow up.

## 2017-10-24 ENCOUNTER — SURGERY (OUTPATIENT)
Age: 37
End: 2017-10-24

## 2017-10-24 ENCOUNTER — HOSPITAL ENCOUNTER (INPATIENT)
Facility: HOSPITAL | Age: 37
LOS: 1 days | Discharge: HOME | DRG: 641 | End: 2017-10-24
Attending: SURGERY | Admitting: SURGERY
Payer: COMMERCIAL

## 2017-10-24 DIAGNOSIS — E66.01 MORBID OBESITY WITH BMI OF 50.0-59.9, ADULT: ICD-10-CM

## 2017-10-24 LAB — POCT GLUCOSE: 106 MG/DL (ref 70–110)

## 2017-10-24 PROCEDURE — 25000003 PHARM REV CODE 250: Performed by: SURGERY

## 2017-10-24 PROCEDURE — 63600175 PHARM REV CODE 636 W HCPCS: Performed by: SURGERY

## 2017-10-24 PROCEDURE — 12000002 HC ACUTE/MED SURGE SEMI-PRIVATE ROOM

## 2017-10-24 PROCEDURE — 82962 GLUCOSE BLOOD TEST: CPT | Performed by: SURGERY

## 2017-10-24 PROCEDURE — S0028 INJECTION, FAMOTIDINE, 20 MG: HCPCS | Performed by: SURGERY

## 2017-10-24 RX ORDER — FAMOTIDINE 10 MG/ML
20 INJECTION INTRAVENOUS ONCE
Status: COMPLETED | OUTPATIENT
Start: 2017-10-24 | End: 2017-10-24

## 2017-10-24 RX ORDER — SODIUM CITRATE AND CITRIC ACID MONOHYDRATE 334; 500 MG/5ML; MG/5ML
30 SOLUTION ORAL ONCE
Status: COMPLETED | OUTPATIENT
Start: 2017-10-24 | End: 2017-10-24

## 2017-10-24 RX ORDER — HEPARIN SODIUM 5000 [USP'U]/ML
5000 INJECTION, SOLUTION INTRAVENOUS; SUBCUTANEOUS ONCE
Status: COMPLETED | OUTPATIENT
Start: 2017-10-24 | End: 2017-10-24

## 2017-10-24 RX ORDER — MUPIROCIN 20 MG/G
OINTMENT TOPICAL
Status: DISCONTINUED | OUTPATIENT
Start: 2017-10-24 | End: 2017-10-24 | Stop reason: HOSPADM

## 2017-10-24 RX ORDER — METOCLOPRAMIDE HYDROCHLORIDE 5 MG/ML
10 INJECTION INTRAMUSCULAR; INTRAVENOUS ONCE
Status: COMPLETED | OUTPATIENT
Start: 2017-10-24 | End: 2017-10-24

## 2017-10-24 RX ADMIN — METOCLOPRAMIDE 10 MG: 5 INJECTION, SOLUTION INTRAMUSCULAR; INTRAVENOUS at 05:10

## 2017-10-24 RX ADMIN — SODIUM CITRATE AND CITRIC ACID MONOHYDRATE 30 ML: 500; 334 SOLUTION ORAL at 05:10

## 2017-10-24 RX ADMIN — FAMOTIDINE 20 MG: 10 INJECTION, SOLUTION INTRAVENOUS at 05:10

## 2017-10-24 RX ADMIN — HEPARIN SODIUM 5000 UNITS: 5000 INJECTION, SOLUTION INTRAVENOUS; SUBCUTANEOUS at 05:10

## 2017-10-24 NOTE — INTERVAL H&P NOTE
The patient has been examined and the H&P has been reviewed:    Patient has decided upon arrival to cancel his surgery.  I will have the office contact him about further options.    Anesthesia/Surgery risks, benefits and alternative options discussed and understood by patient/family.          Active Hospital Problems    Diagnosis  POA    Morbid obesity with BMI of 50.0-59.9, adult [E66.01, Z68.43]  Not Applicable      Resolved Hospital Problems    Diagnosis Date Resolved POA   No resolved problems to display.

## 2017-10-24 NOTE — PROGRESS NOTES
Patient is having second thoughts about having his surgery.  Patient is very nervous and does not want to have the surgery.  Patient states that he will just make some lifestyle changes at home to lose the weight.  Dr. Massey notified.  Patient's IV discontinued per his request at this time. Patient is dressed and waiting to speak with Dr. Massey before leaving.

## 2017-10-24 NOTE — PROGRESS NOTES
Dr. Massey spoke with patient.  Patient still wants to cancel surgery.  Orders received for discharge. Patient walked out.

## 2017-10-27 ENCOUNTER — DOCUMENTATION ONLY (OUTPATIENT)
Dept: BARIATRICS | Facility: CLINIC | Age: 37
End: 2017-10-27

## 2017-10-30 ENCOUNTER — TELEPHONE (OUTPATIENT)
Dept: BARIATRICS | Facility: CLINIC | Age: 37
End: 2017-10-30

## 2017-10-30 NOTE — TELEPHONE ENCOUNTER
Called patient to f/u and left vm as not available.  Left vm with my number calling to check on him.

## 2018-01-31 ENCOUNTER — IMMUNIZATION (OUTPATIENT)
Dept: INTERNAL MEDICINE | Facility: CLINIC | Age: 38
End: 2018-01-31
Payer: COMMERCIAL

## 2018-01-31 ENCOUNTER — OFFICE VISIT (OUTPATIENT)
Dept: INTERNAL MEDICINE | Facility: CLINIC | Age: 38
End: 2018-01-31
Payer: COMMERCIAL

## 2018-01-31 VITALS
HEART RATE: 68 BPM | DIASTOLIC BLOOD PRESSURE: 80 MMHG | SYSTOLIC BLOOD PRESSURE: 148 MMHG | RESPIRATION RATE: 18 BRPM | WEIGHT: 315 LBS | BODY MASS INDEX: 46.65 KG/M2 | HEIGHT: 69 IN

## 2018-01-31 DIAGNOSIS — I15.2 HYPERTENSION ASSOCIATED WITH DIABETES: ICD-10-CM

## 2018-01-31 DIAGNOSIS — E11.69 HYPERLIPIDEMIA ASSOCIATED WITH TYPE 2 DIABETES MELLITUS: ICD-10-CM

## 2018-01-31 DIAGNOSIS — E11.59 HYPERTENSION ASSOCIATED WITH DIABETES: ICD-10-CM

## 2018-01-31 DIAGNOSIS — E11.69 DIABETES MELLITUS TYPE 2 IN OBESE: ICD-10-CM

## 2018-01-31 DIAGNOSIS — D64.9 NORMOCYTIC ANEMIA: ICD-10-CM

## 2018-01-31 DIAGNOSIS — D75.839 THROMBOCYTOSIS: ICD-10-CM

## 2018-01-31 DIAGNOSIS — E78.5 HYPERLIPIDEMIA ASSOCIATED WITH TYPE 2 DIABETES MELLITUS: ICD-10-CM

## 2018-01-31 DIAGNOSIS — E66.9 DIABETES MELLITUS TYPE 2 IN OBESE: ICD-10-CM

## 2018-01-31 DIAGNOSIS — Z00.00 ANNUAL PHYSICAL EXAM: Primary | ICD-10-CM

## 2018-01-31 LAB
CREAT UR-MCNC: 65 MG/DL
MICROALBUMIN UR DL<=1MG/L-MCNC: <2.5 UG/ML
MICROALBUMIN/CREATININE RATIO: NORMAL UG/MG

## 2018-01-31 PROCEDURE — 90471 IMMUNIZATION ADMIN: CPT | Mod: S$GLB,,, | Performed by: INTERNAL MEDICINE

## 2018-01-31 PROCEDURE — 90686 IIV4 VACC NO PRSV 0.5 ML IM: CPT | Mod: S$GLB,,, | Performed by: INTERNAL MEDICINE

## 2018-01-31 PROCEDURE — 99395 PREV VISIT EST AGE 18-39: CPT | Mod: 25,S$GLB,, | Performed by: INTERNAL MEDICINE

## 2018-01-31 PROCEDURE — 82043 UR ALBUMIN QUANTITATIVE: CPT

## 2018-01-31 PROCEDURE — 99999 PR PBB SHADOW E&M-EST. PATIENT-LVL III: CPT | Mod: PBBFAC,,, | Performed by: INTERNAL MEDICINE

## 2018-01-31 RX ORDER — ROSUVASTATIN CALCIUM 40 MG/1
40 TABLET, COATED ORAL NIGHTLY
Qty: 90 TABLET | Refills: 3 | Status: SHIPPED | OUTPATIENT
Start: 2018-01-31 | End: 2019-09-27 | Stop reason: SDUPTHER

## 2018-01-31 RX ORDER — METFORMIN HYDROCHLORIDE EXTENDED-RELEASE TABLETS 500 MG/1
500 TABLET, FILM COATED, EXTENDED RELEASE ORAL
Qty: 90 TABLET | Refills: 3 | Status: SHIPPED | OUTPATIENT
Start: 2018-01-31 | End: 2018-12-28 | Stop reason: SDUPTHER

## 2018-01-31 RX ORDER — AMLODIPINE BESYLATE 10 MG/1
10 TABLET ORAL EVERY MORNING
Qty: 90 TABLET | Refills: 3 | Status: ON HOLD | OUTPATIENT
Start: 2018-01-31 | End: 2020-08-08 | Stop reason: HOSPADM

## 2018-01-31 RX ORDER — OLMESARTAN MEDOXOMIL AND HYDROCHLOROTHIAZIDE 40/12.5 40; 12.5 MG/1; MG/1
1 TABLET ORAL DAILY
Qty: 90 TABLET | Refills: 3 | Status: SHIPPED | OUTPATIENT
Start: 2018-01-31 | End: 2018-10-19

## 2018-01-31 NOTE — PROGRESS NOTES
INTERNAL MEDICINE INITIAL VISIT NOTE      CHIEF COMPLAINT     Est Care    HPI     Nain Jain is a 37 y.o. AA male who presents to est care.  Out of medicines x 2 weeks.    DM2 since 2015 - metformin xr 500MG QAM  Checks glucose daily - 100s-120s.  a1c - 6.5 10/18/17  Foot - 10/9/17  Eye - 8/22/17 Dr. Augusto BAUM - need one.  LDL - 123 5/10/17 on crestor 40mg daily.    HTN - amlodipine 10 mg daily, hydrochlorothiazide 12.5 mg daily, losartan 100 mg daily (reports losartan does not work); previously on benicar and that works better. Out of amlodipine and hctz.  Reports BPs have been high since  Pharm stress 5/2017 neg.    HLD - Crestor 40 mg nightly.     BMI 50.23 - was going to do the gastric sleeve but decided against it.  Walking more (walks from bus to streetcar). Drinking a lot more water. Eliminated some fast food and rice.   Lost 32lbs.     Severe WALTER 6/14/17 - CPAP 8-20cm auto-titrate  Using it nightly. No longer snoring. Helps w/ daytime sleepiness.     Thrombocytosis -  10/18/17. Normocytic anemia. Fe, B12, folate 5/10/17 WNL.    Past Medical History:  Past Medical History:   Diagnosis Date    BMI 50.0-59.9, adult     Diabetes mellitus     Hyperlipidemia     Hypertension     Morbid obesity     Sleep apnea        Past Surgical History:  History reviewed. No pertinent surgical history.    Allergies:  Review of patient's allergies indicates:  No Known Allergies    meds reviewed    Family History:  Family History   Problem Relation Age of Onset    Diabetes Mother     Obesity Mother     Hypertension Mother     Diabetes Maternal Grandmother     COPD Father      smoker    No Known Problems Sister     No Known Problems Brother     No Known Problems Brother     No Known Problems Sister     No Known Problems Sister        Social History:  Social History   Substance Use Topics    Smoking status: Never Smoker    Smokeless tobacco: Never Used    Alcohol use Yes      Comment: 2 bottles of wine a  "week - only drinks when he's off.       Review of Systems:  Review of Systems   Constitutional: Negative for chills and fever.   HENT: Negative.    Eyes: Negative for visual disturbance.   Respiratory: Negative for cough, shortness of breath and wheezing.    Cardiovascular: Negative for chest pain and palpitations.   Gastrointestinal: Negative.    Endocrine: Negative for polydipsia and polyuria.   Genitourinary: Negative.    Musculoskeletal: Negative.    Skin: Negative for rash.   Neurological: Negative for dizziness, weakness, light-headedness and numbness.   Psychiatric/Behavioral: Negative.        Health Maintainence:   Td 9/24/14  Flu - didn't get the flu vaccine  Pneumovax 1/26/15    PHYSICAL EXAM     BP (!) 148/80   Pulse 68   Resp 18   Ht 5' 9" (1.753 m)   Wt (!) 146.1 kg (322 lb 1.5 oz)   BMI 47.56 kg/m²     GEN - A+OX4, NAD   HEENT - PERRL, EOMI, OP clear and crowded. MMM.   Neck - No thyromegaly or cervical LAD. No thyroid masses felt.  CV - RRR, no m/r   Chest - CTAB, no wheezing or rhonchi  Abd - S/NT/ND/+BS.   Ext - 2+BDP and radial pulses. No LE edema.   Feet-normal nails.  Normal sensation to monofilament.  No open lesions.  Neuro - 5/5 BUE and BLE strength. Sensation to light touch intact throughout. 2+ DTRs. Normal gait.   MSK - No spinal tenderness to palpation. Normal gait.   Skin - No rash.    LABS     Previous labs reviewed.    ASSESSMENT/PLAN     Nain Jain is a 37 y.o. male with  Nain was seen today for establish care.    Diagnoses and all orders for this visit:    Annual physical exam - fasting labs in 4-5 weeks.  -     CBC auto differential; Future; Expected date: 02/28/2018  -     Comprehensive metabolic panel; Future; Expected date: 02/28/2018  -     Hemoglobin A1c; Future; Expected date: 02/28/2018  -     Lipid panel; Future; Expected date: 02/28/2018  -     TSH; Future; Expected date: 02/28/2018    Diabetes mellitus type 2 in obese  -     MICROALBUMIN / CREATININE RATIO " URINE  -     metFORMIN (FORTAMET) 500 mg 24 hr tablet; Take 1 tablet (500 mg total) by mouth daily with breakfast.  -     Comprehensive metabolic panel; Future; Expected date: 02/28/2018  -     Hemoglobin A1c; Future; Expected date: 02/28/2018    Hypertension associated with diabetes - not controlled but out of meds and stopped losartan as he felt like it doesn't work. benicar worked well for him.   -     olmesartan-hydrochlorothiazide (BENICAR HCT) 40-12.5 mg Tab; Take 1 tablet by mouth once daily.  -     amLODIPine (NORVASC) 10 MG tablet; Take 1 tablet (10 mg total) by mouth every morning.  -     Comprehensive metabolic panel; Future; Expected date: 02/28/2018  -     TSH; Future; Expected date: 02/28/2018    Hyperlipidemia associated with type 2 diabetes mellitus  -     rosuvastatin (CRESTOR) 40 MG Tab; Take 1 tablet (40 mg total) by mouth every evening.  -     Comprehensive metabolic panel; Future; Expected date: 02/28/2018  -     Lipid panel; Future; Expected date: 02/28/2018    BMI 47.56, adult - lost about 32lbs over the last 3 mo! Continue w/ diet and exercise. Reassess BP at next visit, may not need all 3 bp meds.    Thrombocytosis - repeat CBC    Normocytic anemia - fe, b12, folate WNL 5/2017. Chronic anemia. Stable hgb.     Flu vaccine today.     RTC in 1 month, sooner if needed and depending on labs.    Huyen Arias MD  Department of Internal Medicine - [facility]  7:23 AM

## 2018-03-07 ENCOUNTER — LAB VISIT (OUTPATIENT)
Dept: LAB | Facility: HOSPITAL | Age: 38
End: 2018-03-07
Attending: INTERNAL MEDICINE
Payer: COMMERCIAL

## 2018-03-07 DIAGNOSIS — E11.69 HYPERLIPIDEMIA ASSOCIATED WITH TYPE 2 DIABETES MELLITUS: ICD-10-CM

## 2018-03-07 DIAGNOSIS — Z00.00 ANNUAL PHYSICAL EXAM: ICD-10-CM

## 2018-03-07 DIAGNOSIS — E11.69 DIABETES MELLITUS TYPE 2 IN OBESE: ICD-10-CM

## 2018-03-07 DIAGNOSIS — E66.9 DIABETES MELLITUS TYPE 2 IN OBESE: ICD-10-CM

## 2018-03-07 DIAGNOSIS — I15.2 HYPERTENSION ASSOCIATED WITH DIABETES: ICD-10-CM

## 2018-03-07 DIAGNOSIS — E11.59 HYPERTENSION ASSOCIATED WITH DIABETES: ICD-10-CM

## 2018-03-07 DIAGNOSIS — E78.5 HYPERLIPIDEMIA ASSOCIATED WITH TYPE 2 DIABETES MELLITUS: ICD-10-CM

## 2018-03-07 LAB
ALBUMIN SERPL BCP-MCNC: 4 G/DL
ALP SERPL-CCNC: 94 U/L
ALT SERPL W/O P-5'-P-CCNC: 38 U/L
ANION GAP SERPL CALC-SCNC: 8 MMOL/L
AST SERPL-CCNC: 34 U/L
BASOPHILS # BLD AUTO: 0.01 K/UL
BASOPHILS NFR BLD: 0.1 %
BILIRUB SERPL-MCNC: 1 MG/DL
BUN SERPL-MCNC: 11 MG/DL
CALCIUM SERPL-MCNC: 9.5 MG/DL
CHLORIDE SERPL-SCNC: 105 MMOL/L
CHOLEST SERPL-MCNC: 187 MG/DL
CHOLEST/HDLC SERPL: 4.5 {RATIO}
CO2 SERPL-SCNC: 26 MMOL/L
CREAT SERPL-MCNC: 0.9 MG/DL
DIFFERENTIAL METHOD: ABNORMAL
EOSINOPHIL # BLD AUTO: 0.3 K/UL
EOSINOPHIL NFR BLD: 4 %
ERYTHROCYTE [DISTWIDTH] IN BLOOD BY AUTOMATED COUNT: 13 %
EST. GFR  (AFRICAN AMERICAN): >60 ML/MIN/1.73 M^2
EST. GFR  (NON AFRICAN AMERICAN): >60 ML/MIN/1.73 M^2
ESTIMATED AVG GLUCOSE: 134 MG/DL
GLUCOSE SERPL-MCNC: 121 MG/DL
HBA1C MFR BLD HPLC: 6.3 %
HCT VFR BLD AUTO: 37.8 %
HDLC SERPL-MCNC: 42 MG/DL
HDLC SERPL: 22.5 %
HGB BLD-MCNC: 12.9 G/DL
LDLC SERPL CALC-MCNC: 113.4 MG/DL
LYMPHOCYTES # BLD AUTO: 2.9 K/UL
LYMPHOCYTES NFR BLD: 37.4 %
MCH RBC QN AUTO: 30.7 PG
MCHC RBC AUTO-ENTMCNC: 34.1 G/DL
MCV RBC AUTO: 90 FL
MONOCYTES # BLD AUTO: 0.7 K/UL
MONOCYTES NFR BLD: 8.9 %
NEUTROPHILS # BLD AUTO: 3.8 K/UL
NEUTROPHILS NFR BLD: 49.3 %
NONHDLC SERPL-MCNC: 145 MG/DL
PLATELET # BLD AUTO: 344 K/UL
PMV BLD AUTO: 9.5 FL
POTASSIUM SERPL-SCNC: 4 MMOL/L
PROT SERPL-MCNC: 7.6 G/DL
RBC # BLD AUTO: 4.2 M/UL
SODIUM SERPL-SCNC: 139 MMOL/L
TRIGL SERPL-MCNC: 158 MG/DL
TSH SERPL DL<=0.005 MIU/L-ACNC: 0.85 UIU/ML
WBC # BLD AUTO: 7.68 K/UL

## 2018-03-07 PROCEDURE — 83036 HEMOGLOBIN GLYCOSYLATED A1C: CPT

## 2018-03-07 PROCEDURE — 84443 ASSAY THYROID STIM HORMONE: CPT

## 2018-03-07 PROCEDURE — 85025 COMPLETE CBC W/AUTO DIFF WBC: CPT

## 2018-03-07 PROCEDURE — 80053 COMPREHEN METABOLIC PANEL: CPT

## 2018-03-07 PROCEDURE — 80061 LIPID PANEL: CPT

## 2018-03-07 PROCEDURE — 36415 COLL VENOUS BLD VENIPUNCTURE: CPT

## 2018-03-20 ENCOUNTER — TELEPHONE (OUTPATIENT)
Dept: INTERNAL MEDICINE | Facility: CLINIC | Age: 38
End: 2018-03-20

## 2018-03-20 NOTE — TELEPHONE ENCOUNTER
----- Message from Valentine Escalante sent at 3/13/2018  1:37 PM CDT -----  Contact: Self Call  [phone]  Patient missed his nurse appointment on 3/7 for a b/p check. He stated he left another message to schedule him again for a b/p. Please call him with an appointment for b/p check with nurse.

## 2018-05-22 ENCOUNTER — HOSPITAL ENCOUNTER (EMERGENCY)
Facility: HOSPITAL | Age: 38
Discharge: HOME | End: 2018-05-22
Attending: EMERGENCY MEDICINE
Payer: COMMERCIAL

## 2018-05-22 ENCOUNTER — TELEPHONE (OUTPATIENT)
Dept: INTERNAL MEDICINE | Facility: CLINIC | Age: 38
End: 2018-05-22

## 2018-05-22 VITALS
SYSTOLIC BLOOD PRESSURE: 137 MMHG | TEMPERATURE: 98 F | HEIGHT: 69 IN | RESPIRATION RATE: 20 BRPM | BODY MASS INDEX: 46.65 KG/M2 | WEIGHT: 315 LBS | OXYGEN SATURATION: 96 % | HEART RATE: 92 BPM | DIASTOLIC BLOOD PRESSURE: 79 MMHG

## 2018-05-22 DIAGNOSIS — R06.02 SOB (SHORTNESS OF BREATH): ICD-10-CM

## 2018-05-22 DIAGNOSIS — R06.02 SHORTNESS OF BREATH: ICD-10-CM

## 2018-05-22 LAB
ALBUMIN SERPL BCP-MCNC: 3.6 G/DL
ALP SERPL-CCNC: 90 U/L
ALT SERPL W/O P-5'-P-CCNC: 27 U/L
ANION GAP SERPL CALC-SCNC: 11 MMOL/L
AST SERPL-CCNC: 26 U/L
BASOPHILS # BLD AUTO: 0.03 K/UL
BASOPHILS NFR BLD: 0.4 %
BILIRUB SERPL-MCNC: 0.6 MG/DL
BNP SERPL-MCNC: <10 PG/ML
BUN SERPL-MCNC: 12 MG/DL
CALCIUM SERPL-MCNC: 9.4 MG/DL
CHLORIDE SERPL-SCNC: 104 MMOL/L
CO2 SERPL-SCNC: 23 MMOL/L
CREAT SERPL-MCNC: 0.9 MG/DL
DIFFERENTIAL METHOD: ABNORMAL
EOSINOPHIL # BLD AUTO: 0.5 K/UL
EOSINOPHIL NFR BLD: 6.3 %
ERYTHROCYTE [DISTWIDTH] IN BLOOD BY AUTOMATED COUNT: 12.1 %
EST. GFR  (AFRICAN AMERICAN): >60 ML/MIN/1.73 M^2
EST. GFR  (NON AFRICAN AMERICAN): >60 ML/MIN/1.73 M^2
GLUCOSE SERPL-MCNC: 199 MG/DL
HCT VFR BLD AUTO: 36.6 %
HGB BLD-MCNC: 12.6 G/DL
IMM GRANULOCYTES # BLD AUTO: 0.02 K/UL
IMM GRANULOCYTES NFR BLD AUTO: 0.3 %
INR PPP: 0.9
LYMPHOCYTES # BLD AUTO: 2.9 K/UL
LYMPHOCYTES NFR BLD: 37.8 %
MCH RBC QN AUTO: 31.1 PG
MCHC RBC AUTO-ENTMCNC: 34.4 G/DL
MCV RBC AUTO: 90 FL
MONOCYTES # BLD AUTO: 0.6 K/UL
MONOCYTES NFR BLD: 7.6 %
NEUTROPHILS # BLD AUTO: 3.6 K/UL
NEUTROPHILS NFR BLD: 47.6 %
NRBC BLD-RTO: 0 /100 WBC
PLATELET # BLD AUTO: 349 K/UL
PMV BLD AUTO: 9.5 FL
POTASSIUM SERPL-SCNC: 3.9 MMOL/L
PROT SERPL-MCNC: 7.3 G/DL
PROTHROMBIN TIME: 9.6 SEC
RBC # BLD AUTO: 4.05 M/UL
SODIUM SERPL-SCNC: 138 MMOL/L
TROPONIN I SERPL DL<=0.01 NG/ML-MCNC: <0.006 NG/ML
WBC # BLD AUTO: 7.6 K/UL

## 2018-05-22 PROCEDURE — 99284 EMERGENCY DEPT VISIT MOD MDM: CPT | Mod: 25

## 2018-05-22 PROCEDURE — 83880 ASSAY OF NATRIURETIC PEPTIDE: CPT

## 2018-05-22 PROCEDURE — 93005 ELECTROCARDIOGRAM TRACING: CPT

## 2018-05-22 PROCEDURE — 84484 ASSAY OF TROPONIN QUANT: CPT

## 2018-05-22 PROCEDURE — 94761 N-INVAS EAR/PLS OXIMETRY MLT: CPT

## 2018-05-22 PROCEDURE — 93010 ELECTROCARDIOGRAM REPORT: CPT | Mod: ,,, | Performed by: INTERNAL MEDICINE

## 2018-05-22 PROCEDURE — 80053 COMPREHEN METABOLIC PANEL: CPT

## 2018-05-22 PROCEDURE — 85610 PROTHROMBIN TIME: CPT

## 2018-05-22 PROCEDURE — 85025 COMPLETE CBC W/AUTO DIFF WBC: CPT

## 2018-05-22 RX ORDER — ALBUTEROL SULFATE 90 UG/1
1-2 AEROSOL, METERED RESPIRATORY (INHALATION) EVERY 6 HOURS PRN
Qty: 1 INHALER | Refills: 0 | Status: SHIPPED | OUTPATIENT
Start: 2018-05-22 | End: 2019-05-22

## 2018-05-22 RX ORDER — ALBUTEROL SULFATE 90 UG/1
1-2 AEROSOL, METERED RESPIRATORY (INHALATION) EVERY 6 HOURS PRN
Qty: 1 INHALER | Refills: 0 | Status: SHIPPED | OUTPATIENT
Start: 2018-05-22 | End: 2018-05-22

## 2018-05-22 RX ORDER — OMEPRAZOLE 20 MG/1
20 CAPSULE, DELAYED RELEASE ORAL DAILY
Qty: 30 CAPSULE | Refills: 0 | Status: SHIPPED | OUTPATIENT
Start: 2018-05-22 | End: 2019-04-18 | Stop reason: CLARIF

## 2018-05-22 NOTE — ED NOTES
Patient Identifiers for Nain Jain checked and correct  LOC: The patient is awake, alert and aware of environment with an appropriate affect, the patient is oriented x 3 and speaking appropriate.  APPEARANCE: Patient resting comfortably and in no acute distress, patient is clean and well groomed, patient's clothing is properly fastened.  SKIN: The skin is warm and dry, patient has normal skin turgor and moist mucus membranes,no rashes or lesions.Skin Intact , No Breakdown Noted  Musculoskeletal :  Normal range of motion noted. Moves all extremeties well, No swelling or tenderness noted  RESPIRATORY: Airway is open and patent, respirations are spontaneous, patient has a normal effort and rate. Pt reports chest tightness and SOB when lying flat  CARDIAC: Patient has a normal rate and rhythm, no periphreal edema noted, capillary refill < 3 seconds.   ABDOMEN: Soft and non tender to palpation, no distention noted.   PULSES: 2+  And symmetrical in all extremeties  NEUROLOGIC: PERRL. facial expression is symmetrical, patient moving all extremities, normal sensation in all extremities when touched with a finger.The patient is awake, alert and cooperative with a calm affect, patient is aware of environment.    Will continue to monitor

## 2018-05-22 NOTE — TELEPHONE ENCOUNTER
Please schedule follow up w/ another provider if he wants something today as I don't have anything available. It looks like he was just discharged this morning at 7am from ED. I've reviewed his labs and xray and they seem to be within normal limits. If he wants to see me, can take 1pm tomorrow - 1 day change.

## 2018-05-22 NOTE — ED PROVIDER NOTES
Encounter Date: 5/22/2018    SCRIBE #1 NOTE: I, Josejoseph Gallegos, am scribing for, and in the presence of,  Dr. Jimenez. I have scribed the entire note.       History     Chief Complaint   Patient presents with    Shortness of Breath     SOB x3 days worse when lying flat accompanied by chest wall tenderness.      Time patient was seen by the provider: 5:10 AM      The patient is a 37 y.o. male with co-morbidities including: HTN, DM, HLD, sleep apnea who presents to the ED with a complaint of constant shortness of breath, which began 3 days ago. Shortness of breath worsens with lying flat. He states shortness of breath is better throughout the day and upon exertion. He denies any chest pain, nausea, vomiting, cough, fever, or leg swelling. Pt is a non smoker       The history is provided by the patient and medical records.     Review of patient's allergies indicates:  No Known Allergies  Past Medical History:   Diagnosis Date    BMI 50.0-59.9, adult     Diabetes mellitus     Hyperlipidemia     Hypertension     Morbid obesity     Sleep apnea      History reviewed. No pertinent surgical history.  Family History   Problem Relation Age of Onset    Diabetes Mother     Obesity Mother     Hypertension Mother     Diabetes Maternal Grandmother     COPD Father         smoker    No Known Problems Sister     No Known Problems Brother     No Known Problems Brother     No Known Problems Sister     No Known Problems Sister      Social History   Substance Use Topics    Smoking status: Never Smoker    Smokeless tobacco: Never Used    Alcohol use Yes      Comment: 2 bottles of wine a week - only drinks when he's off.     Review of Systems   Constitutional: Negative for fever.   Respiratory: Positive for shortness of breath. Negative for cough.    Cardiovascular: Negative for chest pain and leg swelling.   Gastrointestinal: Negative for nausea and vomiting.   All other systems reviewed and are negative.      Physical Exam      Initial Vitals [05/22/18 0344]   BP Pulse Resp Temp SpO2   (!) 144/82 89 20 97.9 °F (36.6 °C) 97 %      MAP       102.67         Physical Exam    Nursing note and vitals reviewed.  Constitutional: He appears well-developed and well-nourished. He is not diaphoretic. No distress.   Obese    HENT:   Head: Normocephalic and atraumatic.   Eyes: Pupils are equal, round, and reactive to light.   Cardiovascular: Normal rate, regular rhythm, normal heart sounds and intact distal pulses.   Pulmonary/Chest: No respiratory distress. He has no wheezes. He has no rhonchi. He has no rales.   Distant breath sounds but equal    Abdominal: Soft. Bowel sounds are normal. He exhibits no distension. There is no tenderness. There is no rebound.   Musculoskeletal:   No pedal edema   Neurological: He is alert and oriented to person, place, and time.   Skin: Skin is warm and dry.         ED Course   Procedures  Labs Reviewed   CBC W/ AUTO DIFFERENTIAL - Abnormal; Notable for the following:        Result Value    RBC 4.05 (*)     Hemoglobin 12.6 (*)     Hematocrit 36.6 (*)     MCH 31.1 (*)     All other components within normal limits   COMPREHENSIVE METABOLIC PANEL - Abnormal; Notable for the following:     Glucose 199 (*)     All other components within normal limits   TROPONIN I   B-TYPE NATRIURETIC PEPTIDE   PROTIME-INR     EKG Readings: (Independently Interpreted)   Sinus rhythm, rate 84, normal intervals, no ischemia        X-Rays:   Independently Interpreted Readings:   Chest X-Ray: No acute abnormalities.     Medical Decision Making:   History:   Old Medical Records: I decided to obtain old medical records.  Initial Assessment:   My initial differential diagnoses include but are not limited to: CHF, pneumonia, anemia, obstructive lung disease, acid reflux, sinus disease   Clinical Tests:   Lab Tests: Ordered and Reviewed  Radiological Study: Ordered and Reviewed  Medical Tests: Ordered and Reviewed            Scribe Attestation:    Scribe #1: I performed the above scribed service and the documentation accurately describes the services I performed. I attest to the accuracy of the note.               Clinical Impression:   Diagnoses of SOB (shortness of breath) and Shortness of breath were pertinent to this visit.         I, Dr. Loco Jimenez, personally performed the services described in this documentation. All medical record entries made by the scribe were at my direction and in my presence.  I have reviewed the chart and agree that the record reflects my personal performance and is accurate and complete. Loco Jimenez MD.  5:54 PM 05/22/2018                 Zia Jimenez MD  05/22/18 8086

## 2018-05-22 NOTE — ED TRIAGE NOTES
"Pt reports SOB for the past 3 nights, states "feels like a weight is on my chest when I lie flat" Reports tightness in chest. States he has a CPAP machine at home, but lately feels like "it's suffocating me". No chest pain  "

## 2018-05-22 NOTE — TELEPHONE ENCOUNTER
----- Message from Roslyn Castro sent at 5/22/2018 10:32 AM CDT -----  Contact: patient  Patient needs to speak with the nurse about being worked in to Dr Arias's schedule today, patient went to the ER last night he just left for shortness of breath for which he did have testing & they were normal.  Patient is still having the shortness of breath I did try to find something Dr Arias there was not an opening so I searched there was an appointment with a nurse practitioner but prefers a MD.  Please call patient & let him know if Dr Arias would see him today or maybe another dr can be asked to to fit him in not sure what Dr Arias wants to do but he feels the need to be seen today.  Patient's # is [phone] or [phone]

## 2018-05-22 NOTE — ED NOTES
Pt is awake and alert. Respirations are spontaneous with normal rate and effort. Pt has no needs at this time.

## 2018-05-26 ENCOUNTER — OFFICE VISIT (OUTPATIENT)
Dept: INTERNAL MEDICINE | Facility: CLINIC | Age: 38
End: 2018-05-26
Payer: COMMERCIAL

## 2018-05-26 VITALS
DIASTOLIC BLOOD PRESSURE: 82 MMHG | WEIGHT: 315 LBS | HEART RATE: 86 BPM | HEIGHT: 69 IN | TEMPERATURE: 98 F | SYSTOLIC BLOOD PRESSURE: 134 MMHG | BODY MASS INDEX: 46.65 KG/M2 | OXYGEN SATURATION: 98 %

## 2018-05-26 DIAGNOSIS — J68.0: Primary | ICD-10-CM

## 2018-05-26 DIAGNOSIS — G47.00 INSOMNIA, UNSPECIFIED TYPE: ICD-10-CM

## 2018-05-26 DIAGNOSIS — F41.9 ANXIETY: ICD-10-CM

## 2018-05-26 PROCEDURE — 3008F BODY MASS INDEX DOCD: CPT | Mod: CPTII,S$GLB,, | Performed by: INTERNAL MEDICINE

## 2018-05-26 PROCEDURE — 3079F DIAST BP 80-89 MM HG: CPT | Mod: CPTII,S$GLB,, | Performed by: INTERNAL MEDICINE

## 2018-05-26 PROCEDURE — 99999 PR PBB SHADOW E&M-EST. PATIENT-LVL III: CPT | Mod: PBBFAC,,, | Performed by: INTERNAL MEDICINE

## 2018-05-26 PROCEDURE — 3075F SYST BP GE 130 - 139MM HG: CPT | Mod: CPTII,S$GLB,, | Performed by: INTERNAL MEDICINE

## 2018-05-26 PROCEDURE — 96372 THER/PROPH/DIAG INJ SC/IM: CPT | Mod: S$GLB,,, | Performed by: INTERNAL MEDICINE

## 2018-05-26 PROCEDURE — 99213 OFFICE O/P EST LOW 20 MIN: CPT | Mod: 25,S$GLB,, | Performed by: INTERNAL MEDICINE

## 2018-05-26 RX ORDER — TIZANIDINE 4 MG/1
TABLET ORAL
COMMUNITY
Start: 2018-04-23 | End: 2018-05-26

## 2018-05-26 RX ORDER — ALPRAZOLAM 0.5 MG/1
0.5 TABLET ORAL 3 TIMES DAILY PRN
Qty: 30 TABLET | Refills: 0 | Status: SHIPPED | OUTPATIENT
Start: 2018-05-26 | End: 2018-07-21 | Stop reason: SDUPTHER

## 2018-05-26 RX ORDER — METFORMIN HYDROCHLORIDE 500 MG/1
TABLET, EXTENDED RELEASE ORAL
COMMUNITY
Start: 2018-04-23 | End: 2018-07-31 | Stop reason: SDUPTHER

## 2018-05-26 RX ORDER — BETAMETHASONE SODIUM PHOSPHATE AND BETAMETHASONE ACETATE 3; 3 MG/ML; MG/ML
12 INJECTION, SUSPENSION INTRA-ARTICULAR; INTRALESIONAL; INTRAMUSCULAR; SOFT TISSUE
Status: COMPLETED | OUTPATIENT
Start: 2018-05-26 | End: 2018-05-26

## 2018-05-26 RX ADMIN — BETAMETHASONE SODIUM PHOSPHATE AND BETAMETHASONE ACETATE 12 MG: 3; 3 INJECTION, SUSPENSION INTRA-ARTICULAR; INTRALESIONAL; INTRAMUSCULAR; SOFT TISSUE at 08:05

## 2018-07-03 ENCOUNTER — TELEPHONE (OUTPATIENT)
Dept: SLEEP MEDICINE | Facility: CLINIC | Age: 38
End: 2018-07-03

## 2018-07-03 ENCOUNTER — PATIENT MESSAGE (OUTPATIENT)
Dept: SLEEP MEDICINE | Facility: CLINIC | Age: 38
End: 2018-07-03

## 2018-07-03 ENCOUNTER — PATIENT MESSAGE (OUTPATIENT)
Dept: OPTOMETRY | Facility: CLINIC | Age: 38
End: 2018-07-03

## 2018-07-03 DIAGNOSIS — G47.33 OBSTRUCTIVE SLEEP APNEA: Primary | ICD-10-CM

## 2018-07-03 NOTE — TELEPHONE ENCOUNTER
----- Message from Loulou Parra sent at 7/3/2018  8:40 AM CDT -----  Contact: pt            Name of Who is Calling: CONSTANCE ROWLAND [[id number]]      What is the request in detail: pt needs a script renewed to continue to get sleep supplies filled.. Please advise    Can the clinic reply by [facility]: no      What Number [facility] Back [facility]: [phone]

## 2018-07-15 ENCOUNTER — HOSPITAL ENCOUNTER (EMERGENCY)
Facility: OTHER | Age: 38
Discharge: HOME | End: 2018-07-15
Attending: EMERGENCY MEDICINE
Payer: COMMERCIAL

## 2018-07-15 ENCOUNTER — NURSE TRIAGE (OUTPATIENT)
Dept: ADMINISTRATIVE | Facility: CLINIC | Age: 38
End: 2018-07-15

## 2018-07-15 VITALS
TEMPERATURE: 98 F | RESPIRATION RATE: 18 BRPM | HEART RATE: 86 BPM | SYSTOLIC BLOOD PRESSURE: 141 MMHG | HEIGHT: 69 IN | DIASTOLIC BLOOD PRESSURE: 86 MMHG | OXYGEN SATURATION: 97 % | BODY MASS INDEX: 46.65 KG/M2 | WEIGHT: 315 LBS

## 2018-07-15 DIAGNOSIS — E66.01 MORBID OBESITY: ICD-10-CM

## 2018-07-15 DIAGNOSIS — I10 ESSENTIAL HYPERTENSION: ICD-10-CM

## 2018-07-15 DIAGNOSIS — R06.02 SHORTNESS OF BREATH: Primary | ICD-10-CM

## 2018-07-15 PROCEDURE — 25000003 PHARM REV CODE 250: Performed by: EMERGENCY MEDICINE

## 2018-07-15 PROCEDURE — 63600175 PHARM REV CODE 636 W HCPCS: Performed by: EMERGENCY MEDICINE

## 2018-07-15 PROCEDURE — 99284 EMERGENCY DEPT VISIT MOD MDM: CPT | Mod: 25

## 2018-07-15 PROCEDURE — 93010 ELECTROCARDIOGRAM REPORT: CPT | Mod: ,,, | Performed by: INTERNAL MEDICINE

## 2018-07-15 PROCEDURE — 96372 THER/PROPH/DIAG INJ SC/IM: CPT

## 2018-07-15 RX ORDER — HYDROXYZINE PAMOATE 25 MG/1
50 CAPSULE ORAL
Status: COMPLETED | OUTPATIENT
Start: 2018-07-15 | End: 2018-07-15

## 2018-07-15 RX ORDER — DEXAMETHASONE SODIUM PHOSPHATE 4 MG/ML
8 INJECTION, SOLUTION INTRA-ARTICULAR; INTRALESIONAL; INTRAMUSCULAR; INTRAVENOUS; SOFT TISSUE
Status: COMPLETED | OUTPATIENT
Start: 2018-07-15 | End: 2018-07-15

## 2018-07-15 RX ADMIN — DEXAMETHASONE SODIUM PHOSPHATE 8 MG: 4 INJECTION, SOLUTION INTRAMUSCULAR; INTRAVENOUS at 03:07

## 2018-07-15 RX ADMIN — HYDROXYZINE PAMOATE 50 MG: 25 CAPSULE ORAL at 03:07

## 2018-07-15 NOTE — TELEPHONE ENCOUNTER
"  Reason for Disposition   Sounds like a life-threatening emergency to the triager    Answer Assessment - Initial Assessment Questions  1. RESPIRATORY STATUS: "Describe your breathing?" (e.g., wheezing, shortness of breath, unable to speak, severe coughing)       Out of breathe,like I have been running  2. ONSET: "When did this breathing problem begin?"       This evening  3. PATTERN "Does the difficult breathing come and go, or has it been constant since it started?"       constant  4. SEVERITY: "How bad is your breathing?" (e.g., mild, moderate, severe)     - MILD: No SOB at rest, mild SOB with walking, speaks normally in sentences, can lay down, no retractions, pulse < 100.     - MODERATE: SOB at rest, SOB with minimal exertion and prefers to sit, cannot lie down flat, speaks in phrases, mild retractions, audible wheezing, pulse 100-120.     - SEVERE: Very SOB at rest, speaks in single words, struggling to breathe, sitting hunched forward, retractions, pulse > 120       severe  5. RECURRENT SYMPTOM: "Have you had difficulty breathing before?" If so, ask: "When was the last time?" and "What happened that time?"       Yes, steroid and anxiety  6. CARDIAC HISTORY: "Do you have any history of heart disease?" (e.g., heart attack, angina, bypass surgery, angioplasty)       no  7. LUNG HISTORY: "Do you have any history of lung disease?"  (e.g., pulmonary embolus, asthma, emphysema)      Previous hx in May  8. CAUSE: "What do you think is causing the breathing problem?"       perfume  9. OTHER SYMPTOMS: "Do you have any other symptoms? (e.g., dizziness, runny nose, cough, chest pain, fever)      burning  10. PREGNANCY: "Is there any chance you are pregnant?" "When was your last menstrual period?"        n/a  11. TRAVEL: "Have you traveled out of the country in the last month?" (e.g., travel history, exposures)        no    Protocols used: ST BREATHING DIFFICULTY-A-AH    "

## 2018-07-15 NOTE — ED TRIAGE NOTES
"Pt presents for trouble breathing, onset about 1900 yesterday. Pt states "my wife put on loud perfume and it was making my airways inflamed". Pt states he cant sleep and then gets anxious that he cant sleep. He can feel short of breath, but feels like he has to take several shallow breaths. Pt states he took a xanax with onset of symptoms. Pt states hx of similar symtomps when he was around bleach and states he received steroid shot and anxiety medication with relief of symptoms. Pt is well appearing,  Pt in NAD.   "

## 2018-07-15 NOTE — ED PROVIDER NOTES
"Encounter Date: 7/15/2018    SCRIBE #1 NOTE: I, Anya Ramos, am scribing for, and in the presence of, Dr. Thao.       History     Chief Complaint   Patient presents with    Shortness of Breath     his wife put on "loud" perfume tonight, the smell was too strong, SOB causes him anxiety     Time seen by provider: 1:54 AM    This is a 37 y.o. male who presents with complaint of shortness of breath that began about four hours ago. Patient reports smelling strong perfume when he developed shortness of breath, chest tightness, and anxiety. He denies chest pain, palpitations, wheezing, dizziness, or lightheadedness. He denies using any medications or treatments for symptoms prior to arrival. He denies any history of asthma. He reports a previous similar episode that occurred about two months ago. He denies tobacco use.         The history is provided by the patient.     Review of patient's allergies indicates:  No Known Allergies  Past Medical History:   Diagnosis Date    BMI 50.0-59.9, adult     Diabetes mellitus     Hyperlipidemia     Hypertension     Morbid obesity     Sleep apnea      Past Surgical History:   Procedure Laterality Date    NO PAST SURGERIES       Family History   Problem Relation Age of Onset    Diabetes Mother     Obesity Mother     Hypertension Mother     Diabetes Maternal Grandmother     COPD Father         smoker    No Known Problems Sister     No Known Problems Brother     No Known Problems Brother     No Known Problems Sister     No Known Problems Sister      Social History   Substance Use Topics    Smoking status: Never Smoker    Smokeless tobacco: Never Used    Alcohol use Yes      Comment: 2 bottles of wine a week - only drinks when he's off.     Review of Systems   Constitutional: Negative for fever.   HENT: Negative for sore throat and trouble swallowing.    Eyes: Negative for itching.   Respiratory: Positive for chest tightness and shortness of breath. Negative for " cough, wheezing and stridor.    Cardiovascular: Negative for chest pain and palpitations.   Gastrointestinal: Negative for nausea.   Genitourinary: Negative for dysuria.   Musculoskeletal: Negative for back pain.   Skin: Negative for rash.   Neurological: Negative for dizziness, weakness and light-headedness.   Psychiatric/Behavioral: The patient is nervous/anxious.        Physical Exam     Initial Vitals [07/15/18 0141]   BP Pulse Resp Temp SpO2   (!) 163/91 105 20 97.9 °F (36.6 °C) 95 %      MAP       --         Physical Exam    Nursing note and vitals reviewed.  Constitutional: He appears well-developed and well-nourished. No distress.   Morbidly obese.    HENT:   Head: Normocephalic and atraumatic.   Eyes: Conjunctivae and EOM are normal. Pupils are equal, round, and reactive to light.   Neck: Normal range of motion. Neck supple.   Cardiovascular: Normal rate and normal heart sounds.   Pulmonary/Chest: Effort normal and breath sounds normal. He has no wheezes.   Speaking in full sentences.    Abdominal: Soft. Normal appearance and bowel sounds are normal. There is no tenderness.   Musculoskeletal: Normal range of motion.   Neurological: He is alert and oriented to person, place, and time. He has normal strength. No cranial nerve deficit or sensory deficit.   Skin: Skin is warm and dry.   Psychiatric: He has a normal mood and affect. His behavior is normal. Thought content normal.         ED Course   Procedures  Labs Reviewed - No data to display  EKG Readings: (Independently Interpreted)   Sinus tachycardia at a rate of 103 bpm. Normal axis. No STEMI.        Imaging Results          X-Ray Chest PA And Lateral (Final result)  Result time 07/15/18 02:56:26    Final result by Blayne Rowe MD (07/15/18 02:56:26)                 Impression:      No acute cardiopulmonary finding.      Electronically signed by: Blayne Rowe MD  Date:    07/15/2018  Time:    02:56             Narrative:    EXAMINATION:  XR  CHEST PA AND LATERAL    CLINICAL HISTORY:  Shortness of breath.    TECHNIQUE:  Frontal and lateral views of the chest were performed.    COMPARISON:  05/22/2018.    FINDINGS:  Cardiac silhouette is not enlarged. No focal consolidation.  No sizable pleural effusion.  No pneumothorax.  No acute bony abnormality.                              X-Rays:   Independently Interpreted Readings:   Chest X-Ray: Trachea midline. No cardiomegaly. No pleural effusion. No pneumothorax.     Medical Decision Making:   Initial Assessment:   Urgent evaluation of obese 36 yo M here with concern for shortness of breath/difficulty breathing sp exposure to strongly scented perfume. Pt had similar episode in the past when exposed to bleach. Here pt is speaking full sentences, no wheezing, no chest pain. Suspect anxiety component w possible reactive airway, though no hx of asthma in past. I do not suspect pe at this time. CXR without abnml and VS improved sp atarax. Will given dex and dc home with fu PCP, strict return precautions given.   Independently Interpreted Test(s):   I have ordered and independently interpreted X-rays - see prior notes.  I have ordered and independently interpreted EKG Reading(s) - see prior notes  Clinical Tests:   Radiological Study: Ordered and Reviewed  Medical Tests: Ordered and Reviewed              Attending Attestation:           Physician Attestation for Scribe:  Physician Attestation Statement for Scribe #1: I, Dr. Thao, reviewed documentation, as scribed by Anya Ramos in my presence, and it is both accurate and complete.                    Clinical Impression:     1. Shortness of breath    2. Morbid obesity    3. Essential hypertension          Disposition:   Disposition: Discharged  Condition: Stable                        Melissa Thao MD  07/15/18 6461

## 2018-07-17 ENCOUNTER — PATIENT MESSAGE (OUTPATIENT)
Dept: OPTOMETRY | Facility: CLINIC | Age: 38
End: 2018-07-17

## 2018-07-18 ENCOUNTER — PATIENT MESSAGE (OUTPATIENT)
Dept: OPTOMETRY | Facility: CLINIC | Age: 38
End: 2018-07-18

## 2018-07-19 ENCOUNTER — OFFICE VISIT (OUTPATIENT)
Dept: OPTOMETRY | Facility: CLINIC | Age: 38
End: 2018-07-19
Payer: COMMERCIAL

## 2018-07-19 DIAGNOSIS — Z79.84 LONG TERM CURRENT USE OF ORAL HYPOGLYCEMIC DRUG: ICD-10-CM

## 2018-07-19 DIAGNOSIS — H31.8 CHOROIDAL LESION: ICD-10-CM

## 2018-07-19 DIAGNOSIS — B00.9 RECURRENT HERPES SIMPLEX: ICD-10-CM

## 2018-07-19 DIAGNOSIS — E11.9 TYPE 2 DIABETES MELLITUS WITHOUT RETINOPATHY: Primary | ICD-10-CM

## 2018-07-19 PROCEDURE — 92014 COMPRE OPH EXAM EST PT 1/>: CPT | Mod: S$GLB,,, | Performed by: OPTOMETRIST

## 2018-07-19 PROCEDURE — 99999 PR PBB SHADOW E&M-EST. PATIENT-LVL II: CPT | Mod: PBBFAC,,, | Performed by: OPTOMETRIST

## 2018-07-19 PROCEDURE — 92250 FUNDUS PHOTOGRAPHY W/I&R: CPT | Mod: S$GLB,,, | Performed by: OPTOMETRIST

## 2018-07-19 NOTE — PROGRESS NOTES
HPI     Mr. Nain Jain is here for evaluation of his eyes to have FMLA forms   completed for his recurring herpes keratitis.    He is currently asymptomatic, but says he has had 3 flare-ups in the last   year, lasts about 3 days, last occurred a few months ago. He reports   excellent compliance with Acyclovir 400mg PO BID for the last year.    Would patient like a refraction today? No     (-)drops  (-)flashes  (-)floaters  (-)diplopia    Diabetic yes   Hemoglobin A1C       Date                     Value               Ref Range             Status           03/07/2018               6.3 (H)             4.0 - 5.6 %         Final  10/18/2017               6.5 (H)             4.0 - 5.6 %         Final  05/10/2017               6.5 (H)             4.5 - 6.2 %         Final      OCULAR HISTORY  Last Eye Exam 08/22/17 with Dr. Casas  (-)eye surgery   H/o herpes keratitis OD (01/05/16, 02/26/17, 08/19/17, pt reports 3   flare-ups between August 2017 and July 2018), OS unaffected.  Choroidal lesion OS (first noted 05/2017, pt cancelled retina consultation   in 2017).       FAMILY HISTORY  (-)Glaucoma none       Last edited by Kathy Casas, OD on 7/19/2018  9:58 AM. (History)            Assessment /Plan     For exam results, see Encounter Report.    Type 2 diabetes mellitus without retinopathy  Long term current use of oral hypoglycemic drug   No retinopathy noted OU. Monitor with yearly DFE.     Recurrent herpes simplex   Pt reports 3 recurrences OD in the last 1 year despite taking Acyclovir 400mg PO BID. Recommended evaluation with cornea specialist to determine cause of recurrences.  -     Ambulatory Referral to Ophthalmology    Choroidal lesion   As previously noted. Discussed potential for choroidal mass, referred to retina specialist. Photos taken today, appear stable compared to 2017 photos.  -     Color Fundus Photography - OU - Both Eyes       RTC 07/23/18 for cornea consult  RTC 08/01/18 for retina consult

## 2018-07-21 ENCOUNTER — OFFICE VISIT (OUTPATIENT)
Dept: INTERNAL MEDICINE | Facility: CLINIC | Age: 38
End: 2018-07-21
Payer: COMMERCIAL

## 2018-07-21 VITALS
TEMPERATURE: 98 F | DIASTOLIC BLOOD PRESSURE: 70 MMHG | OXYGEN SATURATION: 98 % | BODY MASS INDEX: 52.45 KG/M2 | WEIGHT: 315 LBS | SYSTOLIC BLOOD PRESSURE: 130 MMHG | HEART RATE: 72 BPM

## 2018-07-21 DIAGNOSIS — E66.09 OBESITY DUE TO EXCESS CALORIES, UNSPECIFIED CLASSIFICATION, UNSPECIFIED WHETHER SERIOUS COMORBIDITY PRESENT: ICD-10-CM

## 2018-07-21 DIAGNOSIS — R06.02 SOB (SHORTNESS OF BREATH): ICD-10-CM

## 2018-07-21 DIAGNOSIS — F41.9 ANXIETY: Primary | ICD-10-CM

## 2018-07-21 PROCEDURE — 3075F SYST BP GE 130 - 139MM HG: CPT | Mod: CPTII,S$GLB,, | Performed by: INTERNAL MEDICINE

## 2018-07-21 PROCEDURE — 3078F DIAST BP <80 MM HG: CPT | Mod: CPTII,S$GLB,, | Performed by: INTERNAL MEDICINE

## 2018-07-21 PROCEDURE — 99999 PR PBB SHADOW E&M-EST. PATIENT-LVL IV: CPT | Mod: PBBFAC,,, | Performed by: INTERNAL MEDICINE

## 2018-07-21 PROCEDURE — 3008F BODY MASS INDEX DOCD: CPT | Mod: CPTII,S$GLB,, | Performed by: INTERNAL MEDICINE

## 2018-07-21 PROCEDURE — 99213 OFFICE O/P EST LOW 20 MIN: CPT | Mod: S$GLB,,, | Performed by: INTERNAL MEDICINE

## 2018-07-21 RX ORDER — ALPRAZOLAM 0.5 MG/1
0.5 TABLET ORAL 3 TIMES DAILY PRN
Qty: 30 TABLET | Refills: 0 | Status: SHIPPED | OUTPATIENT
Start: 2018-07-21 | End: 2019-04-18 | Stop reason: SDUPTHER

## 2018-07-21 NOTE — PROGRESS NOTES
Subjective:       Patient ID: Nain Jain is a 37 y.o. male.    Chief Complaint: Infection (exposure to mold )    Impressively anxious man says he is aware there is mold in his house and the more he thinks about it the more short of breath he gets. Seems to have some sensitivity to strong smells from clorox to perfumes, but when exposed to these smells he becomes extremely anxious and feels he cannot breathe.  He gets so upset talking about it he is crying.      Review of Systems   Constitutional: Negative for activity change, appetite change and fever.   HENT: Negative for congestion, postnasal drip and sore throat.    Respiratory: Negative for cough, shortness of breath and wheezing.    Cardiovascular: Negative for chest pain and palpitations.   Gastrointestinal: Negative for abdominal pain, blood in stool, constipation, diarrhea, nausea and vomiting.   Genitourinary: Negative for decreased urine volume, difficulty urinating, flank pain and frequency.   Musculoskeletal: Negative for arthralgias.   Neurological: Negative for dizziness, weakness and headaches.       Objective:      Physical Exam   Constitutional: He is oriented to person, place, and time. He appears well-developed and well-nourished. No distress.       HENT:   Head: Normocephalic and atraumatic.   Right Ear: External ear normal.   Left Ear: External ear normal.   Eyes: Conjunctivae and EOM are normal. Pupils are equal, round, and reactive to light.   Neck: Normal range of motion. Neck supple. No thyromegaly present.   Cardiovascular: Normal rate and regular rhythm.    Pulmonary/Chest: Effort normal and breath sounds normal.   Abdominal: Soft. Bowel sounds are normal. He exhibits no mass. There is no tenderness. There is no rebound and no guarding.   Musculoskeletal: Normal range of motion.   Lymphadenopathy:     He has no cervical adenopathy.   Neurological: He is alert and oriented to person, place, and time. He has normal reflexes. He displays  normal reflexes. No cranial nerve deficit. He exhibits normal muscle tone. Coordination normal.   Skin: Skin is warm and dry.   Psychiatric: His speech is normal and behavior is normal. Thought content normal. His mood appears anxious. His affect is labile.       Assessment:       1. Anxiety    2. Obesity due to excess calories, unspecified classification, unspecified whether serious comorbidity present    3. SOB (shortness of breath)        Plan:   Nain was seen today for infection.    Diagnoses and all orders for this visit:    Anxiety  -     Ambulatory Referral to Psychiatry    Obesity due to excess calories, unspecified classification, unspecified whether serious comorbidity present    SOB (shortness of breath)  -     Ambulatory consult to Allergy  -     Complete PFT with bronchodilator    Other orders  -     ALPRAZolam (XANAX) 0.5 MG tablet; Take 1 tablet (0.5 mg total) by mouth 3 (three) times daily as needed for Anxiety.

## 2018-07-23 ENCOUNTER — OFFICE VISIT (OUTPATIENT)
Dept: OPHTHALMOLOGY | Facility: CLINIC | Age: 38
End: 2018-07-23
Payer: COMMERCIAL

## 2018-07-23 DIAGNOSIS — B00.52 HERPES KERATITIS: Primary | ICD-10-CM

## 2018-07-23 PROCEDURE — 92014 COMPRE OPH EXAM EST PT 1/>: CPT | Mod: S$GLB,,, | Performed by: OPHTHALMOLOGY

## 2018-07-23 PROCEDURE — 99999 PR PBB SHADOW E&M-EST. PATIENT-LVL II: CPT | Mod: PBBFAC,,, | Performed by: OPHTHALMOLOGY

## 2018-07-23 NOTE — PROGRESS NOTES
HPI     Referred by Dr. Casas    Recurrent hsv    Acyclovir 400 mg PO BID    Patient here for Recurrent Herpes Keratitis OD. Patient states he has had   3 flare with in the past year. He states he will usually double up on the   medication when he would have a flare up and it would resolve within 2-3   days. He thinks his flare ups happen when he is stressed. He says he has   been offered drops previously but did not get them due to cost. Pt not   sure of which eye drops were offered.     Last edited by Jacqueline Olson MD on 7/23/2018  3:52 PM. (History)            Assessment /Plan     For exam results, see Encounter Report.    Herpes keratitis      Pt reports several flare ups, eye quiet today however.  Cont ACV 400mg BID and f/up with me when pt feels flare up coming on.    Otherwise - okay for REE with dr. Casas.

## 2018-07-23 NOTE — LETTER
July 23, 2018      Kathy Casas, OD  [address]  [facility] [address]           [facility]hthalmology  [address]  [facility] [address]  Phone: [phone]  Fax: [phone]          Patient: Nain Jain   MR Number: [id number]   YOB: 1980   Date of Visit: 7/23/2018       Dear Dr. Kathy Casas:    Thank you for referring Nain Jain to me for evaluation. Attached you will find relevant portions of my assessment and plan of care.    If you have questions, please do not hesitate to call me. I look forward to following Nain Jain along with you.    Sincerely,    Jacqueline Olson MD    Enclosure  CC:  No Recipients    If you would like to receive this communication electronically, please contact [email or web address] or [phone] to request more information on [employer] Link access.    For providers and/or their staff who would like to refer a patient to [facility], please contact us through our one-stop-shop provider referral line, [facility], at [phone].    If you feel you have received this communication in error or would no longer like to receive these types of communications, please e-mail [email or web address]

## 2018-07-24 ENCOUNTER — PATIENT MESSAGE (OUTPATIENT)
Dept: OPTOMETRY | Facility: CLINIC | Age: 38
End: 2018-07-24

## 2018-07-24 ENCOUNTER — TELEPHONE (OUTPATIENT)
Dept: OPHTHALMOLOGY | Facility: CLINIC | Age: 38
End: 2018-07-24

## 2018-07-24 DIAGNOSIS — B00.52 HERPES KERATITIS: Primary | ICD-10-CM

## 2018-07-24 RX ORDER — ACYCLOVIR 400 MG/1
400 TABLET ORAL 2 TIMES DAILY
Qty: 60 TABLET | Refills: 11 | Status: SHIPPED | OUTPATIENT
Start: 2018-07-24 | End: 2019-10-03 | Stop reason: SDUPTHER

## 2018-07-28 ENCOUNTER — NURSE TRIAGE (OUTPATIENT)
Dept: ADMINISTRATIVE | Facility: CLINIC | Age: 38
End: 2018-07-28

## 2018-07-29 ENCOUNTER — OFFICE VISIT (OUTPATIENT)
Dept: URGENT CARE | Facility: CLINIC | Age: 38
End: 2018-07-29
Payer: COMMERCIAL

## 2018-07-29 VITALS
HEIGHT: 69 IN | BODY MASS INDEX: 46.65 KG/M2 | TEMPERATURE: 98 F | OXYGEN SATURATION: 97 % | WEIGHT: 315 LBS | DIASTOLIC BLOOD PRESSURE: 84 MMHG | HEART RATE: 110 BPM | SYSTOLIC BLOOD PRESSURE: 133 MMHG | RESPIRATION RATE: 19 BRPM

## 2018-07-29 DIAGNOSIS — R06.02 SHORTNESS OF BREATH: Primary | ICD-10-CM

## 2018-07-29 DIAGNOSIS — I49.8 OTHER CARDIAC ARRHYTHMIA: ICD-10-CM

## 2018-07-29 PROCEDURE — 3079F DIAST BP 80-89 MM HG: CPT | Mod: CPTII,S$GLB,, | Performed by: NURSE PRACTITIONER

## 2018-07-29 PROCEDURE — 3075F SYST BP GE 130 - 139MM HG: CPT | Mod: CPTII,S$GLB,, | Performed by: NURSE PRACTITIONER

## 2018-07-29 PROCEDURE — 99214 OFFICE O/P EST MOD 30 MIN: CPT | Mod: S$GLB,,, | Performed by: NURSE PRACTITIONER

## 2018-07-29 PROCEDURE — 3008F BODY MASS INDEX DOCD: CPT | Mod: CPTII,S$GLB,, | Performed by: NURSE PRACTITIONER

## 2018-07-29 NOTE — PATIENT INSTRUCTIONS
CONTINUE TAKING YOUR XANAX AS NEEDED FOR ANXIETY    FOLLOW UP WITH CARDIOLOGY- CALL [phone] TOMORROW AT 8 AM TO SET UP THIS APT    GO TO THE ER IF YOU EXPERIENCE CHEST PAIN    Shortness of Breath (Dyspnea)  Shortness of breath is the feeling that you can't catch your breath or get enough air. It is also known as dyspnea.  Dyspnea can be caused by many different conditions. They include:  · Acute asthma attack.  · Worsening of chronic lung diseases such as chronic bronchitis and emphysema.  · Heart failure. This is when weak heart muscle allows extra fluid to collect in the lungs.  · Panic attacks or anxiety. Fear can cause rapid breathing (hyperventilation).  · Pneumonia, or an infection in the lung tissue.  · Exposure to toxic substances, fumes, smoke, or certain medicines.  · Blood clot in the lung (pulmonary embolism). This is often from a piece of blood clot in a deep vein of the leg (deep vein thrombosis) that breaks off and travels to the lungs.  · Heart attack or heart-related chest pain (angina).  · Anemia.  · Collapsed lung (pneumothorax).  · Dehydration.  · Pregnancy.  Based on your visit today, the exact cause of your shortness of breath is not certain. Your tests dont show any of the serious causes of dyspnea. You may need other tests to find out if you have a serious problem. Its important to watch for any new symptoms or symptoms that get worse. Follow up with your healthcare provider as directed.  Home care  Follow these tips to take care of yourself at home:  · When your symptoms are better, go back to your usual activities.  · If you smoke, you should stop. Join a quit-smoking program or ask your healthcare provider for help.  · Eat a healthy diet and get plenty of sleep.  · Get regular exercise. Talk with your healthcare provider before starting to exercise, especially if you have other medical problems.  · Cut down on the amount of caffeine and stimulants you consume.  Follow-up  care  Follow up with your healthcare provider, or as advised.  If tests were done, you will be told if your treatment needs to be changed. You can call as directed for the results.  (Note: If an X-ray was taken, a specialist will review it. You will be notified of any new findings that may affect your care.)  Call 911 or get immediate medical care  Shortness of breath may be a sign of a serious medical problem. For example, it may be a problem with your heart or lungs. Call 911 if you have worsening shortness of breath or trouble breathing, especially with any of the symptoms below:  · You are confused or its difficult to wake you.  · You faint or lose consciousness.  · You have a fast heartbeat, or your heartbeat is irregular.  · You are coughing up blood.  · You have pain in your chest, arm, shoulder, neck, or upper back.  · You break out in a sweat.  When to seek medical advice  Call your healthcare provider right away if any of these occur:  · Slight shortness of breath or wheezing  · Redness, pain or swelling in your leg, arm, or other body area  · Swelling in both legs or ankles  · Fast weight gain  · Dizziness or weakness  · Fever of 100.4ºF (38ºC) or higher, or as directed by your healthcare provider  Date Last Reviewed: 9/13/2015  © 5705-2177 [employer]. [address] [place], [place] [id number]. All rights reserved. This information is not intended as a substitute for professional medical care. Always follow your healthcare professional's instructions.        Coping with Shortness of Breath: Controlling Stress  When you have lung problems, it may be hard for you to breathe. Stress can make it worse. Learn to relax and control stress to prevent shortness of breath and avoid panic.  When anxiety takes over  When you feel short of breath, your neck, shoulder, and chest muscles tense. You become anxious and start to breathe faster. Your breathing muscles tire and trap air in your lungs. Your  chest may feel tight. Anxiety increases and you may start to panic.  Stop panic before it starts    The key to controlling panic is to break the cycle before it starts. When you are becoming short of breath do the following:  · Sit, relax your arms and shoulders.  · Lean forward, resting your upper body on your forearms.  · Breathe in slowly through your nose while counting to 2.  · Hold your lips together as if you are trying to whistle or blow out a candle.  · Breathe out slowly and gently through your pursed lips while counting to 4.  · Repeat these steps as needed.  Learn to relax  Control stress by avoiding things that trigger stress and by relaxing when you start feeling tense. Find a quiet place and sit or lie in a comfortable position. Close your eyes and try the following:  · Picture yourself in an ideal place, doing something you enjoy. Stay in that place until you feel relaxed.  · Slowly tense, then relax, each part of your body. Start with your toes and work up to your scalp. As you breathe in, tighten the muscles. Keep them tight for several seconds. Then relax as you breathe out.  · You can also relax by doing gregorio chi or yoga, praying, meditating, or listening to music or relaxation tapes.  Talk with your healthcare provider about how you are feeling. It's important for your provider to understand what is going on and how it is affecting your life.  Date Last Reviewed: 1/1/2017  © 8871-1343 [employer], [employer]. [address] [place] [address], [place] [address]. All rights reserved. This information is not intended as a substitute for professional medical care. Always follow your healthcare professional's instructions.        Anxiety Reaction  Anxiety is the feeling we all get when we think something bad might happen. It is a normal response to stress and usually causes only a mild reaction. When anxiety becomes more severe, it can interfere with daily life. In some cases, you may not even be aware of  what it is youre anxious about. There may also be a genetic link or it may be a learned behavior in the home.  Both psychological and physical triggers cause stress reaction. It's often a response to fear or emotional stress, real or imagined. This stress may come from home, family, work, or social relationships.  During an anxiety reaction, you may feel:  · Helpless  · Nervous  · Depressed  · Irritable  Your body may show signs of anxiety in many ways. You may experience:  · Dry mouth  · Shakiness  · Dizziness  · Weakness  · Trouble breathing  · Breathing fast (hyperventilating)  · Chest pressure  · Sweating  · Headache  · Nausea  · Diarrhea  · Tiredness  · Inability to sleep  · Sexual problems  Home care  · Try to locate the sources of stress in your life. They may not be obvious. These may include:  ¨ Daily hassles of life (traffic jams, missed appointments, car troubles, etc.)  ¨ Major life changes, both good (new baby, job promotion) and bad (loss of job, loss of loved one)  ¨ Overload: feeling that you have too many responsibilities and can't take care of all of them at once  ¨ Feeling helpless, feeling that your problems are beyond what youre able to solve  · Notice how your body reacts to stress. Learn to listen to your body signals. This will help you take action before the stress becomes severe.  · When you can, do something about the source of your stress. (Avoid hassles, limit the amount of change that happens in your life at one time and take a break when you feel overloaded).  · Unfortunately, many stressful situations can't be avoided. It is necessary to learn how to better manage stress. There are many proven methods that will reduce your anxiety. These include simple things like exercise, good nutrition and adequate rest. Also, there are certain techniques that are helpful:  ¨ Relaxation  ¨ Breathing exercises  ¨ Visualization  ¨ Biofeedback  ¨ Meditation  For more information about this, consult  your doctor or go to a local bookstore and review the many books and tapes available on this subject.  Follow-up care  If you feel that your anxiety is not responding to self-help measures, contact your doctor or make an appointment with a counselor. You may need short-term psychological counseling and temporary medicine to help you manage stress.  Call 911  Call your healthcare provider right away if any of these occur:  · Trouble breathing  · Confusion  · Drowsiness or trouble wakening  · Fainting or loss of consciousness  · Rapid heart rate  · Seizure  · New chest pain that becomes more severe, lasts longer, or spreads into your shoulder, arm, neck, jaw, or back  When to seek medical advice  Call your healthcare provider right away if any of these occur:  · Your symptoms get worse  · Severe headache not relieved by rest and mild pain reliever  Date Last Reviewed: 9/29/2015  © 9011-1513 [employer]. [address] [place] [address], [place] [address]. All rights reserved. This information is not intended as a substitute for professional medical care. Always follow your healthcare professional's instructions.

## 2018-07-29 NOTE — PROGRESS NOTES
"Subjective:       Patient ID: Nain Jain is a 37 y.o. male.    Vitals:  height is 5' 9" (1.753 m) and weight is 161 kg (355 lb) (abnormal). His tympanic temperature is 97.5 °F (36.4 °C). His blood pressure is 133/84 and his pulse is 110. His respiration is 19 and oxygen saturation is 97%.     Chief Complaint: Shortness of Breath    Pt is here for shortness of breath, "fast heart rate", and anxiety. Pt says this has been going on for 3 days. Pt says it started after going on a walk.  Pt says that he has gone to the ER and his primary for this reason and has been given albuterol and xanax but has gotten no relief. Pt denies chest pain, wheezing, dizziness, sweating, headache.       Shortness of Breath   This is a new problem. The current episode started in the past 7 days. The problem occurs constantly. The problem has been gradually worsening. Associated symptoms include headaches. Pertinent negatives include no abdominal pain, chest pain, fever, rash, sore throat or vomiting. The symptoms are aggravated by odors and fumes. He has tried nothing for the symptoms. The treatment provided no relief. There is no history of allergies, aspirin allergies, asthma, bronchiolitis, CAD, chronic lung disease, COPD, DVT, a heart failure, PE, pneumonia or a recent surgery.     Review of Systems   Constitution: Negative for chills and fever.   HENT: Negative for sore throat.    Eyes: Negative for blurred vision.   Cardiovascular: Positive for irregular heartbeat. Negative for chest pain.   Respiratory: Positive for shortness of breath.    Skin: Negative for rash.   Musculoskeletal: Negative for back pain and joint pain.   Gastrointestinal: Negative for abdominal pain, diarrhea, nausea and vomiting.   Neurological: Positive for headaches.   Psychiatric/Behavioral: The patient is nervous/anxious.        Objective:      Physical Exam   Constitutional: He is oriented to person, place, and time. He appears well-developed and " well-nourished. He is cooperative.  Non-toxic appearance. He does not appear ill. No distress.   HENT:   Head: Normocephalic and atraumatic.   Right Ear: Hearing, tympanic membrane, external ear and ear canal normal.   Left Ear: Hearing, tympanic membrane, external ear and ear canal normal.   Nose: Nose normal. No mucosal edema, rhinorrhea or nasal deformity. No epistaxis. Right sinus exhibits no maxillary sinus tenderness and no frontal sinus tenderness. Left sinus exhibits no maxillary sinus tenderness and no frontal sinus tenderness.   Mouth/Throat: Uvula is midline, oropharynx is clear and moist and mucous membranes are normal. No trismus in the jaw. Normal dentition. No uvula swelling. No posterior oropharyngeal erythema.   Eyes: Conjunctivae and lids are normal. Right eye exhibits no discharge. Left eye exhibits no discharge. No scleral icterus.   Sclera clear bilat   Neck: Trachea normal, normal range of motion, full passive range of motion without pain and phonation normal. Neck supple.   Cardiovascular: Normal rate, regular rhythm, normal heart sounds, intact distal pulses and normal pulses.  Exam reveals no decreased pulses.    No murmur heard.  Pulmonary/Chest: Effort normal and breath sounds normal. No respiratory distress. He has no decreased breath sounds. He has no wheezes.   Abdominal: Soft. Normal appearance and bowel sounds are normal. He exhibits no distension, no pulsatile midline mass and no mass. There is no tenderness.   Musculoskeletal: Normal range of motion. He exhibits no edema or deformity.   Neurological: He is alert and oriented to person, place, and time. He exhibits normal muscle tone. Coordination normal.   Skin: Skin is warm, dry and intact. He is not diaphoretic. No pallor.   Psychiatric: His speech is normal and behavior is normal. Judgment and thought content normal. His mood appears anxious. Cognition and memory are normal.   Nursing note and vitals reviewed.      ekg- RATE 95,  PACs with sinus rhythm   Assessment:       1. Shortness of breath    2. Other cardiac arrhythmia        Plan:         Shortness of breath  -     IN OFFICE EKG 12-LEAD (to [place])  -     Ambulatory referral to Cardiology    Other cardiac arrhythmia  -     Ambulatory referral to Cardiology    Discussed at great length with pt what PACs and PVCs were. Discussed signs of a heart attack and when to go to the ER. Pt understood. Pt says his PCP is on maternity leave so he has not seen her- he has been to  and ER. Pt was referred to cardiology.   Patient Instructions       CONTINUE TAKING YOUR XANAX AS NEEDED FOR ANXIETY    FOLLOW UP WITH CARDIOLOGY- CALL [phone] TOMORROW AT 8 AM TO SET UP THIS APT    GO TO THE ER IF YOU EXPERIENCE CHEST PAIN    Shortness of Breath (Dyspnea)  Shortness of breath is the feeling that you can't catch your breath or get enough air. It is also known as dyspnea.  Dyspnea can be caused by many different conditions. They include:  · Acute asthma attack.  · Worsening of chronic lung diseases such as chronic bronchitis and emphysema.  · Heart failure. This is when weak heart muscle allows extra fluid to collect in the lungs.  · Panic attacks or anxiety. Fear can cause rapid breathing (hyperventilation).  · Pneumonia, or an infection in the lung tissue.  · Exposure to toxic substances, fumes, smoke, or certain medicines.  · Blood clot in the lung (pulmonary embolism). This is often from a piece of blood clot in a deep vein of the leg (deep vein thrombosis) that breaks off and travels to the lungs.  · Heart attack or heart-related chest pain (angina).  · Anemia.  · Collapsed lung (pneumothorax).  · Dehydration.  · Pregnancy.  Based on your visit today, the exact cause of your shortness of breath is not certain. Your tests dont show any of the serious causes of dyspnea. You may need other tests to find out if you have a serious problem. Its important to watch for any new symptoms or symptoms that get  worse. Follow up with your healthcare provider as directed.  Home care  Follow these tips to take care of yourself at home:  · When your symptoms are better, go back to your usual activities.  · If you smoke, you should stop. Join a quit-smoking program or ask your healthcare provider for help.  · Eat a healthy diet and get plenty of sleep.  · Get regular exercise. Talk with your healthcare provider before starting to exercise, especially if you have other medical problems.  · Cut down on the amount of caffeine and stimulants you consume.  Follow-up care  Follow up with your healthcare provider, or as advised.  If tests were done, you will be told if your treatment needs to be changed. You can call as directed for the results.  (Note: If an X-ray was taken, a specialist will review it. You will be notified of any new findings that may affect your care.)  Call 911 or get immediate medical care  Shortness of breath may be a sign of a serious medical problem. For example, it may be a problem with your heart or lungs. Call 911 if you have worsening shortness of breath or trouble breathing, especially with any of the symptoms below:  · You are confused or its difficult to wake you.  · You faint or lose consciousness.  · You have a fast heartbeat, or your heartbeat is irregular.  · You are coughing up blood.  · You have pain in your chest, arm, shoulder, neck, or upper back.  · You break out in a sweat.  When to seek medical advice  Call your healthcare provider right away if any of these occur:  · Slight shortness of breath or wheezing  · Redness, pain or swelling in your leg, arm, or other body area  · Swelling in both legs or ankles  · Fast weight gain  · Dizziness or weakness  · Fever of 100.4ºF (38ºC) or higher, or as directed by your healthcare provider  Date Last Reviewed: 9/13/2015  © 2069-9598 [employer]. [address] [place] [address], [place] [address]. All rights reserved. This information is not  intended as a substitute for professional medical care. Always follow your healthcare professional's instructions.        Coping with Shortness of Breath: Controlling Stress  When you have lung problems, it may be hard for you to breathe. Stress can make it worse. Learn to relax and control stress to prevent shortness of breath and avoid panic.  When anxiety takes over  When you feel short of breath, your neck, shoulder, and chest muscles tense. You become anxious and start to breathe faster. Your breathing muscles tire and trap air in your lungs. Your chest may feel tight. Anxiety increases and you may start to panic.  Stop panic before it starts    The key to controlling panic is to break the cycle before it starts. When you are becoming short of breath do the following:  · Sit, relax your arms and shoulders.  · Lean forward, resting your upper body on your forearms.  · Breathe in slowly through your nose while counting to 2.  · Hold your lips together as if you are trying to whistle or blow out a candle.  · Breathe out slowly and gently through your pursed lips while counting to 4.  · Repeat these steps as needed.  Learn to relax  Control stress by avoiding things that trigger stress and by relaxing when you start feeling tense. Find a quiet place and sit or lie in a comfortable position. Close your eyes and try the following:  · Picture yourself in an ideal place, doing something you enjoy. Stay in that place until you feel relaxed.  · Slowly tense, then relax, each part of your body. Start with your toes and work up to your scalp. As you breathe in, tighten the muscles. Keep them tight for several seconds. Then relax as you breathe out.  · You can also relax by doing gregorio chi or yoga, praying, meditating, or listening to music or relaxation tapes.  Talk with your healthcare provider about how you are feeling. It's important for your provider to understand what is going on and how it is affecting your life.  Date  Last Reviewed: 1/1/2017  © 5392-6375 [employer]. [address] [place] [address], [place] [address]. All rights reserved. This information is not intended as a substitute for professional medical care. Always follow your healthcare professional's instructions.        Anxiety Reaction  Anxiety is the feeling we all get when we think something bad might happen. It is a normal response to stress and usually causes only a mild reaction. When anxiety becomes more severe, it can interfere with daily life. In some cases, you may not even be aware of what it is youre anxious about. There may also be a genetic link or it may be a learned behavior in the home.  Both psychological and physical triggers cause stress reaction. It's often a response to fear or emotional stress, real or imagined. This stress may come from home, family, work, or social relationships.  During an anxiety reaction, you may feel:  · Helpless  · Nervous  · Depressed  · Irritable  Your body may show signs of anxiety in many ways. You may experience:  · Dry mouth  · Shakiness  · Dizziness  · Weakness  · Trouble breathing  · Breathing fast (hyperventilating)  · Chest pressure  · Sweating  · Headache  · Nausea  · Diarrhea  · Tiredness  · Inability to sleep  · Sexual problems  Home care  · Try to locate the sources of stress in your life. They may not be obvious. These may include:  ¨ Daily hassles of life (traffic jams, missed appointments, car troubles, etc.)  ¨ Major life changes, both good (new baby, job promotion) and bad (loss of job, loss of loved one)  ¨ Overload: feeling that you have too many responsibilities and can't take care of all of them at once  ¨ Feeling helpless, feeling that your problems are beyond what youre able to solve  · Notice how your body reacts to stress. Learn to listen to your body signals. This will help you take action before the stress becomes severe.  · When you can, do something about the source of your stress.  (Avoid hassles, limit the amount of change that happens in your life at one time and take a break when you feel overloaded).  · Unfortunately, many stressful situations can't be avoided. It is necessary to learn how to better manage stress. There are many proven methods that will reduce your anxiety. These include simple things like exercise, good nutrition and adequate rest. Also, there are certain techniques that are helpful:  ¨ Relaxation  ¨ Breathing exercises  ¨ Visualization  ¨ Biofeedback  ¨ Meditation  For more information about this, consult your doctor or go to a local bookstore and review the many books and tapes available on this subject.  Follow-up care  If you feel that your anxiety is not responding to self-help measures, contact your doctor or make an appointment with a counselor. You may need short-term psychological counseling and temporary medicine to help you manage stress.  Call 911  Call your healthcare provider right away if any of these occur:  · Trouble breathing  · Confusion  · Drowsiness or trouble wakening  · Fainting or loss of consciousness  · Rapid heart rate  · Seizure  · New chest pain that becomes more severe, lasts longer, or spreads into your shoulder, arm, neck, jaw, or back  When to seek medical advice  Call your healthcare provider right away if any of these occur:  · Your symptoms get worse  · Severe headache not relieved by rest and mild pain reliever  Date Last Reviewed: 9/29/2015  © 5083-5683 [employer]. [address] [place] [address], [place] [address]. All rights reserved. This information is not intended as a substitute for professional medical care. Always follow your healthcare professional's instructions.

## 2018-07-29 NOTE — TELEPHONE ENCOUNTER
"Noticed HR elevated. Just went for 30 min walk. HR racing.     Reason for Disposition   Difficulty breathing    Answer Assessment - Initial Assessment Questions  1. DESCRIPTION: "Please describe your heart rate or heart beat that you are having" (e.g., fast/slow, regular/irregular, skipped or extra beats, "palpitations")     [id number]. watching you tube to decrease HR.    2. ONSET: "When did it start?" (Minutes, hours or days)      Past two days. + SOB   3. DURATION: "How long does it last" (e.g., seconds, minutes, hours)     Steady x 2 days Recently been on ER x 2 with SOB- exposed to mold in home. given anxiety med and steroid   4. PATTERN "Does it come and go, or has it been constant since it started?"  "Does it get worse with exertion?"   "Are you feeling it now?"     Steady   5. TAP: "Using your hand, can you tap out what you are feeling on a chair or table in front of you, so that I can hear?" (Note: not all patients can do this)       N/a   6. HEART RATE: "Can you tell me your heart rate?" "How many beats in 15 seconds?"  (Note: not all patients can do this)       107  7. RECURRENT SYMPTOM: "Have you ever had this before?" If so, ask: "When was the last time?" and "What happened that time?"      No   8. CAUSE: "What do you think is causing the palpitations?"     Unsure   9. CARDIAC HISTORY: "Do you have any history of heart disease?" (e.g., heart attack, angina, bypass surgery, angioplasty, arrhythmia)      No   10. OTHER SYMPTOMS: "Do you have any other symptoms?" (e.g., dizziness, chest pain, sweating, difficulty breathing)     /74 OB=370 , sweaty, no dizzy    Protocols used: ST HEART RATE AND HEART BEAT [id number]  UB=404 just checked - ate broccoli and grilled chicken 30 min after   rec to have BP/HR checked at local UC/ED due to elevated HR/ SOB. Pt states Spouse just left- pt states she will take take him to have BP/HR checked at local . Call back with questions.   "

## 2018-07-31 ENCOUNTER — OFFICE VISIT (OUTPATIENT)
Dept: CARDIOLOGY | Facility: CLINIC | Age: 38
End: 2018-07-31
Payer: COMMERCIAL

## 2018-07-31 VITALS
HEART RATE: 94 BPM | DIASTOLIC BLOOD PRESSURE: 60 MMHG | WEIGHT: 315 LBS | HEIGHT: 69 IN | SYSTOLIC BLOOD PRESSURE: 129 MMHG | OXYGEN SATURATION: 100 % | BODY MASS INDEX: 46.65 KG/M2

## 2018-07-31 DIAGNOSIS — R00.2 PALPITATIONS: Primary | ICD-10-CM

## 2018-07-31 PROCEDURE — 99214 OFFICE O/P EST MOD 30 MIN: CPT | Mod: S$GLB,,, | Performed by: INTERNAL MEDICINE

## 2018-07-31 PROCEDURE — 3078F DIAST BP <80 MM HG: CPT | Mod: CPTII,S$GLB,, | Performed by: INTERNAL MEDICINE

## 2018-07-31 PROCEDURE — 99999 PR PBB SHADOW E&M-EST. PATIENT-LVL IV: CPT | Mod: PBBFAC,,,

## 2018-07-31 PROCEDURE — 3074F SYST BP LT 130 MM HG: CPT | Mod: CPTII,S$GLB,, | Performed by: INTERNAL MEDICINE

## 2018-07-31 PROCEDURE — 3008F BODY MASS INDEX DOCD: CPT | Mod: CPTII,S$GLB,, | Performed by: INTERNAL MEDICINE

## 2018-07-31 NOTE — PROGRESS NOTES
Subjective:    Patient ID:  Nain Jain is a 37 y.o. male who presents for evaluation of palpitations.    HPI : Nain Jain is a 37 y.o. M with history of HTN, HLD, DMII, WALTER, morbid obesity who presents for evaluation of palpitations. Patient was referred from urgent care Saturday.     Patient notes that he has been experiencing the sensation of his heart racing in his chest starting about two weeks ago. He states that the sensation can last for hours and occurs multiple times a day. He states that the sensation can happen at anytime and he does not note any prompting factors such as stress or exertion. It can happen while he is sitting; it starts suddenly and gradually slows after several hours. He notes that on some occasions he feels an associated shortness of breath. He denies any chest pain, diaphoresis, or lightheadedness / dizziness. He states there is no family history of heart disease. Patient does not smoke. His alcohol consumption is 1-2 times a week and usually has 3-4 drinks on those occasions. He does not exercise and describes his diet as poor consisting mainly of fast food and is carbohydrate rich.       Review of Systems   Constitution: Negative for chills, decreased appetite, diaphoresis, fever and weakness.   Eyes: Negative for blurred vision and visual disturbance.   Cardiovascular: Positive for palpitations. Negative for chest pain, leg swelling, near-syncope and syncope.   Respiratory: Positive for shortness of breath. Negative for cough and wheezing.    Skin: Negative for color change and dry skin.   Musculoskeletal: Negative for arthritis, back pain, joint swelling and myalgias.   Gastrointestinal: Negative for abdominal pain, change in bowel habit, nausea and vomiting.   Neurological: Negative for dizziness, headaches and light-headedness.   Psychiatric/Behavioral: Negative for altered mental status and depression. The patient is nervous/anxious.      Past Medical History:   Diagnosis  Date    BMI 50.0-59.9, adult     Diabetes mellitus     Hyperlipidemia     Hypertension     Morbid obesity     Sleep apnea        Past Surgical History:   Procedure Laterality Date    NO PAST SURGERIES         Social History     Social History    Marital status:      Spouse name: N/A    Number of children: N/A    Years of education: N/A     Occupational History          Social History Main Topics    Smoking status: Never Smoker    Smokeless tobacco: Never Used    Alcohol use Yes      Comment: 2 bottles of wine a week - only drinks when he's off.    Drug use: No    Sexual activity: Not Asked     Other Topics Concern    None     Social History Narrative    Lives w/wife and 1 child.         Family History   Problem Relation Age of Onset    Diabetes Mother     Obesity Mother     Hypertension Mother     Diabetes Maternal Grandmother     COPD Father         smoker    No Known Problems Sister     No Known Problems Brother     No Known Problems Brother     No Known Problems Sister     No Known Problems Sister      Review of patient's allergies indicates:  No Known Allergies         Objective:    Physical Exam   Constitutional: He appears well-developed and well-nourished. No distress.   HENT:   Head: Normocephalic and atraumatic.   Mouth/Throat: Oropharynx is clear and moist.   Eyes: Conjunctivae are normal. Pupils are equal, round, and reactive to light.   Neck: Normal range of motion. Neck supple. No JVD present. No tracheal deviation present. No thyromegaly present.   Cardiovascular: Normal rate, regular rhythm and normal heart sounds.  Exam reveals no gallop and no friction rub.    No murmur heard.  Pulmonary/Chest: Effort normal and breath sounds normal. No respiratory distress. He has no wheezes. He has no rales.   Abdominal: Soft. Bowel sounds are normal. He exhibits no distension. There is no tenderness.   Musculoskeletal: Normal range of motion. He exhibits no edema or  tenderness.   Skin: Skin is warm and dry. No rash noted. He is not diaphoretic. No erythema. No pallor.   Psychiatric: He has a normal mood and affect. His behavior is normal. Thought content normal.         Assessment:       1. Palpitations         Plan:       Nain was seen today for palpitations.    Diagnoses and all orders for this visit:    Palpitations  -     Holter monitor - 24 hour; Future        -     EKG performed at urgent care showed sinus tachycardia        -     Patient has no history of heart disease: Stress echo performed 5/17 in anticipation of bariatric surgery was normal (EF 60-65%, No evidence of stress induced  myocardial ischemia) but has extensive risk factors        -     Potentially anxiety component of palpitations. Patient has referral to Psychiatry from previous provider but has not yet seen.        -     Discussed diet and lifestyle modification, importance of exercise        -     Follow up as necessary pending results of Holter monitor    Case discussed with staff, Dr. Villarreal.

## 2018-07-31 NOTE — PROGRESS NOTES
I have personally taken the history and examined this patient and agree with the resident's note as stated above.Suspect symptomatic sinus tachycardia. Discussed LSM measures with the patient

## 2018-08-17 ENCOUNTER — PATIENT MESSAGE (OUTPATIENT)
Dept: OPTOMETRY | Facility: CLINIC | Age: 38
End: 2018-08-17

## 2018-09-11 ENCOUNTER — OFFICE VISIT (OUTPATIENT)
Dept: URGENT CARE | Facility: CLINIC | Age: 38
End: 2018-09-11
Payer: COMMERCIAL

## 2018-09-11 VITALS
HEART RATE: 82 BPM | RESPIRATION RATE: 19 BRPM | TEMPERATURE: 98 F | BODY MASS INDEX: 46.65 KG/M2 | WEIGHT: 315 LBS | HEIGHT: 69 IN | OXYGEN SATURATION: 97 % | DIASTOLIC BLOOD PRESSURE: 98 MMHG | SYSTOLIC BLOOD PRESSURE: 155 MMHG

## 2018-09-11 DIAGNOSIS — S67.01XA CRUSH INJURY TO THUMB, RIGHT, INITIAL ENCOUNTER: Primary | ICD-10-CM

## 2018-09-11 PROCEDURE — 99214 OFFICE O/P EST MOD 30 MIN: CPT | Mod: S$GLB,,, | Performed by: NURSE PRACTITIONER

## 2018-09-11 PROCEDURE — 3080F DIAST BP >= 90 MM HG: CPT | Mod: CPTII,S$GLB,, | Performed by: NURSE PRACTITIONER

## 2018-09-11 PROCEDURE — 3077F SYST BP >= 140 MM HG: CPT | Mod: CPTII,S$GLB,, | Performed by: NURSE PRACTITIONER

## 2018-09-11 PROCEDURE — 3008F BODY MASS INDEX DOCD: CPT | Mod: CPTII,S$GLB,, | Performed by: NURSE PRACTITIONER

## 2018-09-11 NOTE — PATIENT INSTRUCTIONS
Follow up with your doctor in a few days.  Return to the urgent care or go to the ER if symptoms get worse.    If symptoms persist after 1-2 weeks, please follow up with your primary care.    The final reading of your xray showed no dislocation or fracture.  See handout on crush injury      Crush Injury of the Hand, No Fracture  You have a crush injury of your hand. This causes local pain, swelling, and sometimes bruising. You dont have any broken bones. This injury may take from a few days to a few weeks to heal.  If a fingernail has been severely injured, it may fall off in 1 to 2 weeks. A new one will usually start to grow back within a month.  Home care  Follow these guidelines when caring for yourself at home:  · Keep your hand elevated to reduce pain and swelling. When sitting or lying down keep your arm raised above the level of your heart. You can do this by placing your arm on a pillow that rests on your chest or on a pillow at your side. This is most important during the first 2 days (48 hours) after the injury.  · Put an ice pack on the injured area. Do this for 20 minutes every 1 to 2 hours the first day for pain relief. You can make an ice pack by wrapping a plastic bag of ice cubes in a thin towel. As the ice melts, be careful that the cast doesnt get wet. Continue to use the ice pack 3 to 4 times a day until the pain and swelling go away.  · You may use acetaminophen or ibuprofen to control pain, unless another pain medicine was prescribed. If you have chronic liver or kidney disease, talk with your health care provider before using these medicines. Also talk with your provider if youve had a stomach ulcer or GI bleeding.  · If you have a splint or cast, keep it dry at all times. Bathe with your splint or cast well out of the water. Protect it with a large plastic bag, rubber-banded at the top end. If a fiberglass cast or splint gets wet, you can dry it with a hair dryer.  · Dont put creams or  objects under the cast if you have itching.  · Dont stick a needle into the wound to drain it.  · Bruised skin may change colors over time. It may change from reddish to bluish to yellowish before the skin goes back to normal coloring.  Follow-up care  Follow up with your health care provider, or as advised, if you dont start to get better within the next 3 days.  If X-rays were taken, a radiologist will look at them. You will be told of any new findings that may affect your care.  When to seek medical advice  Call your health care provider right away if any of these occur:  · The cast cracks  · The plaster cast or splint becomes wet or soft  · The fiberglass cast or splint stays wet for more than 24 hours  · Tightness or pain under the cast or splint gets worse  · Fingers become swollen, cold, blue, numb, or tingly  · Redness, warmth, swelling, drainage from the wound, or foul odor from a cast or splint  · You cant move your fingers  · Fever of 101ºF (38.3ºC) or higher, or as directed by your health care provider   Date Last Reviewed: 2/15/2015  © 3736-0207 [employer]. [address] [place] [address], [place] [address]. All rights reserved. This information is not intended as a substitute for professional medical care. Always follow your healthcare professional's instructions.

## 2018-09-11 NOTE — PROGRESS NOTES
"Subjective:       Patient ID: Nain Jain is a 37 y.o. male.    Vitals:  height is 5' 9" (1.753 m) and weight is 152.9 kg (337 lb) (abnormal). His oral temperature is 97.9 °F (36.6 °C). His blood pressure is 155/98 (abnormal) and his pulse is 82. His respiration is 19 and oxygen saturation is 97%.     Chief Complaint: Hand Pain (slammed R thumb in a door)    Slammed right thumb in door. Reports pain has persisted since injury 1 week ago ( 7 days). Reports swelling and bruising have resolved.      Hand Pain    Incident onset: last tuesday. The incident occurred at work. The injury mechanism was a direct blow. The pain is present in the right hand. The quality of the pain is described as stabbing. The pain does not radiate. The pain is at a severity of 7/10. The pain is severe. The pain has been constant since the incident. Pertinent negatives include no chest pain, numbness or tingling. Exacerbated by: squeezing. He has tried ice (Tylenol) for the symptoms. The treatment provided moderate relief.     Review of Systems   Constitution: Negative for chills and fever.   HENT: Negative for sore throat.    Eyes: Negative for blurred vision.   Cardiovascular: Negative for chest pain.   Respiratory: Negative for shortness of breath.    Skin: Negative for rash.   Musculoskeletal: Negative for back pain and joint pain.   Gastrointestinal: Negative for abdominal pain, diarrhea, nausea and vomiting.   Neurological: Negative for headaches, numbness and tingling.   Psychiatric/Behavioral: The patient is not nervous/anxious.        Objective:      Physical Exam   Constitutional: He is oriented to person, place, and time. He appears well-developed and well-nourished. He is cooperative.  Non-toxic appearance. He does not appear ill. No distress.   HENT:   Head: Normocephalic and atraumatic. Head is without abrasion, without contusion and without laceration.   Right Ear: Hearing, tympanic membrane, external ear and ear canal " normal. No hemotympanum.   Left Ear: Hearing, tympanic membrane, external ear and ear canal normal. No hemotympanum.   Nose: Nose normal. No mucosal edema, rhinorrhea or nasal deformity. No epistaxis. Right sinus exhibits no maxillary sinus tenderness and no frontal sinus tenderness. Left sinus exhibits no maxillary sinus tenderness and no frontal sinus tenderness.   Mouth/Throat: Uvula is midline, oropharynx is clear and moist and mucous membranes are normal. No trismus in the jaw. Normal dentition. No uvula swelling. No posterior oropharyngeal erythema.   Eyes: Conjunctivae, EOM and lids are normal. Pupils are equal, round, and reactive to light. Right eye exhibits no discharge. Left eye exhibits no discharge. No scleral icterus.   Sclera clear bilat   Neck: Trachea normal, normal range of motion, full passive range of motion without pain and phonation normal. Neck supple. No spinous process tenderness and no muscular tenderness present. No neck rigidity. No tracheal deviation present.   Cardiovascular: Normal rate, regular rhythm, normal heart sounds, intact distal pulses and normal pulses.   Pulmonary/Chest: Effort normal and breath sounds normal. No respiratory distress.   Abdominal: Soft. Normal appearance and bowel sounds are normal. He exhibits no distension, no pulsatile midline mass and no mass. There is no tenderness.   Musculoskeletal: Normal range of motion. He exhibits no edema or deformity.        Right hand: He exhibits tenderness. He exhibits normal range of motion and normal capillary refill. Normal sensation noted. Normal strength noted.        Hands:  TTP over thumb pad, plantar aspect. No joint tenderness, no nail injury noted. No laceration.  Good cap refill, full sensation noted.   Neurological: He is alert and oriented to person, place, and time. He has normal strength. No cranial nerve deficit or sensory deficit. He exhibits normal muscle tone. He displays no seizure activity. Coordination  normal. GCS eye subscore is 4. GCS verbal subscore is 5. GCS motor subscore is 6.   Skin: Skin is warm, dry and intact. Capillary refill takes less than 2 seconds. No abrasion, no bruising, no burn, no ecchymosis and no laceration noted. He is not diaphoretic. No pallor.   Psychiatric: He has a normal mood and affect. His speech is normal and behavior is normal. Judgment and thought content normal. Cognition and memory are normal.   Nursing note and vitals reviewed.    X-ray Finger 2 Or More Views Right    Result Date: 9/11/2018  EXAMINATION: XR FINGER 2 OR MORE VIEWS RIGHT CLINICAL HISTORY: Crushing injury of right thumb, initial encounter TECHNIQUE: Three views of the right thumb were obtained. COMPARISON: None FINDINGS: The bones are intact.  There is no evidence for acute fracture or bone destruction.  There is no evidence for dislocation. No radiopaque soft tissue foreign bodies are identified.     No evidence for acute fracture, bone destruction, dislocation, or radiopaque soft tissue foreign bodies. Electronically signed by: Francisco Javier Campos MD Date:    09/11/2018 Time:    14:29  Assessment:       1. Crush injury to thumb, right, initial encounter        Plan:     Advised patient to keep in splint for comfort, continue ibuprofen as needed for pain, and alternate warm and cool compresses.    Crush injury to thumb, right, initial encounter  -     X-Ray Finger 2 or More Views Right; Future; Expected date: 09/11/2018      Patient Instructions   Follow up with your doctor in a few days.  Return to the urgent care or go to the ER if symptoms get worse.    If symptoms persist after 1-2 weeks, please follow up with your primary care.    The final reading of your xray showed no dislocation or fracture.  See handout on crush injury      Crush Injury of the Hand, No Fracture  You have a crush injury of your hand. This causes local pain, swelling, and sometimes bruising. You dont have any broken bones. This injury may take  from a few days to a few weeks to heal.  If a fingernail has been severely injured, it may fall off in 1 to 2 weeks. A new one will usually start to grow back within a month.  Home care  Follow these guidelines when caring for yourself at home:  · Keep your hand elevated to reduce pain and swelling. When sitting or lying down keep your arm raised above the level of your heart. You can do this by placing your arm on a pillow that rests on your chest or on a pillow at your side. This is most important during the first 2 days (48 hours) after the injury.  · Put an ice pack on the injured area. Do this for 20 minutes every 1 to 2 hours the first day for pain relief. You can make an ice pack by wrapping a plastic bag of ice cubes in a thin towel. As the ice melts, be careful that the cast doesnt get wet. Continue to use the ice pack 3 to 4 times a day until the pain and swelling go away.  · You may use acetaminophen or ibuprofen to control pain, unless another pain medicine was prescribed. If you have chronic liver or kidney disease, talk with your health care provider before using these medicines. Also talk with your provider if youve had a stomach ulcer or GI bleeding.  · If you have a splint or cast, keep it dry at all times. Bathe with your splint or cast well out of the water. Protect it with a large plastic bag, rubber-banded at the top end. If a fiberglass cast or splint gets wet, you can dry it with a hair dryer.  · Dont put creams or objects under the cast if you have itching.  · Dont stick a needle into the wound to drain it.  · Bruised skin may change colors over time. It may change from reddish to bluish to yellowish before the skin goes back to normal coloring.  Follow-up care  Follow up with your health care provider, or as advised, if you dont start to get better within the next 3 days.  If X-rays were taken, a radiologist will look at them. You will be told of any new findings that may affect your  care.  When to seek medical advice  Call your health care provider right away if any of these occur:  · The cast cracks  · The plaster cast or splint becomes wet or soft  · The fiberglass cast or splint stays wet for more than 24 hours  · Tightness or pain under the cast or splint gets worse  · Fingers become swollen, cold, blue, numb, or tingly  · Redness, warmth, swelling, drainage from the wound, or foul odor from a cast or splint  · You cant move your fingers  · Fever of 101ºF (38.3ºC) or higher, or as directed by your health care provider   Date Last Reviewed: 2/15/2015  © 1295-0305 [employer]. [address] [place] [address], [place] [address]. All rights reserved. This information is not intended as a substitute for professional medical care. Always follow your healthcare professional's instructions.

## 2018-09-18 DIAGNOSIS — R06.02 SOB (SHORTNESS OF BREATH): Primary | ICD-10-CM

## 2018-09-20 ENCOUNTER — OFFICE VISIT (OUTPATIENT)
Dept: URGENT CARE | Facility: CLINIC | Age: 38
End: 2018-09-20
Payer: COMMERCIAL

## 2018-09-20 VITALS
HEIGHT: 69 IN | DIASTOLIC BLOOD PRESSURE: 77 MMHG | BODY MASS INDEX: 46.65 KG/M2 | RESPIRATION RATE: 18 BRPM | HEART RATE: 75 BPM | OXYGEN SATURATION: 98 % | TEMPERATURE: 98 F | WEIGHT: 315 LBS | SYSTOLIC BLOOD PRESSURE: 130 MMHG

## 2018-09-20 DIAGNOSIS — R05.9 COUGH: ICD-10-CM

## 2018-09-20 DIAGNOSIS — J06.9 VIRAL URI WITH COUGH: Primary | ICD-10-CM

## 2018-09-20 DIAGNOSIS — R06.02 SHORTNESS OF BREATH: ICD-10-CM

## 2018-09-20 PROCEDURE — 99214 OFFICE O/P EST MOD 30 MIN: CPT | Mod: 25,S$GLB,, | Performed by: NURSE PRACTITIONER

## 2018-09-20 PROCEDURE — 3075F SYST BP GE 130 - 139MM HG: CPT | Mod: CPTII,S$GLB,, | Performed by: NURSE PRACTITIONER

## 2018-09-20 PROCEDURE — 94640 AIRWAY INHALATION TREATMENT: CPT | Mod: 59,S$GLB,, | Performed by: NURSE PRACTITIONER

## 2018-09-20 PROCEDURE — 3078F DIAST BP <80 MM HG: CPT | Mod: CPTII,S$GLB,, | Performed by: NURSE PRACTITIONER

## 2018-09-20 RX ORDER — BENZONATATE 200 MG/1
200 CAPSULE ORAL 3 TIMES DAILY PRN
Qty: 30 CAPSULE | Refills: 0 | Status: SHIPPED | OUTPATIENT
Start: 2018-09-20 | End: 2018-09-30

## 2018-09-20 RX ORDER — IPRATROPIUM BROMIDE 0.5 MG/2.5ML
0.5 SOLUTION RESPIRATORY (INHALATION)
Status: COMPLETED | OUTPATIENT
Start: 2018-09-20 | End: 2018-09-20

## 2018-09-20 RX ORDER — ALBUTEROL SULFATE 90 UG/1
2 AEROSOL, METERED RESPIRATORY (INHALATION) EVERY 6 HOURS PRN
Qty: 1 INHALER | Refills: 0 | Status: ON HOLD | OUTPATIENT
Start: 2018-09-20 | End: 2020-08-08 | Stop reason: HOSPADM

## 2018-09-20 RX ORDER — ALBUTEROL SULFATE 0.83 MG/ML
2.5 SOLUTION RESPIRATORY (INHALATION)
Status: COMPLETED | OUTPATIENT
Start: 2018-09-20 | End: 2018-09-20

## 2018-09-20 RX ORDER — ALBUTEROL SULFATE 0.63 MG/3ML
0.63 SOLUTION RESPIRATORY (INHALATION) EVERY 6 HOURS PRN
Status: DISCONTINUED | OUTPATIENT
Start: 2018-09-20 | End: 2018-09-20

## 2018-09-20 RX ORDER — FLUTICASONE PROPIONATE 50 MCG
2 SPRAY, SUSPENSION (ML) NASAL DAILY
Qty: 1 BOTTLE | Refills: 0 | Status: SHIPPED | OUTPATIENT
Start: 2018-09-20 | End: 2019-04-18 | Stop reason: CLARIF

## 2018-09-20 RX ADMIN — ALBUTEROL SULFATE 2.5 MG: 0.83 SOLUTION RESPIRATORY (INHALATION) at 10:09

## 2018-09-20 RX ADMIN — IPRATROPIUM BROMIDE 0.5 MG: 0.5 SOLUTION RESPIRATORY (INHALATION) at 10:09

## 2018-09-20 NOTE — PROGRESS NOTES
"Subjective:       Patient ID: Nain Jain is a 37 y.o. male.    Vitals:  height is 5' 9" (1.753 m) and weight is 154.2 kg (340 lb) (abnormal). His oral temperature is 98 °F (36.7 °C). His blood pressure is 130/77 and his pulse is 75. His respiration is 18 and oxygen saturation is 98%.     Chief Complaint: Cough and Shortness of Breath    Onset from three days ago. Says he has non productive cough, nasal congestion, and feels short of breath with wheezing.  He is not getting relief from his albuterol inhaler and needs a refill.  No fever.  He states that his cough is occasionally clear but more non productive.  He admits that he's had occasional shortness of breath in the past and his wife recently made him a pulmonology appointment but it is not till October.  He already has orders scheduled in with pulmonology.  He sleeps with a CPAP and has had no recent adjustment.  He states that he gets a tightness in his chest but denies actual pain.  Denies diaphoresis.  No leg swelling or orthopnea.  He admits that he's actually already seen cardiology for similar symptoms and was cleared.        Cough   This is a new problem. The current episode started in the past 7 days. The problem has been unchanged. The problem occurs constantly. The cough is non-productive. Associated symptoms include nasal congestion, shortness of breath and wheezing. Pertinent negatives include no chest pain, chills, ear congestion, ear pain, eye redness, fever, headaches, heartburn, hemoptysis, myalgias, postnasal drip, rash, rhinorrhea, sore throat, sweats or weight loss. He has tried nothing for the symptoms. There is no history of asthma, COPD or pneumonia.   Shortness of Breath   This is a new problem. The current episode started in the past 7 days. The problem occurs constantly. The problem has been unchanged. Associated symptoms include wheezing. Pertinent negatives include no abdominal pain, chest pain, claudication, ear pain, fever, " headaches, hemoptysis, leg pain, leg swelling, neck pain, orthopnea, rash, rhinorrhea, sore throat, sputum production or syncope. He has tried beta agonist inhalers for the symptoms. The treatment provided no relief. There is no history of asthma, CAD, chronic lung disease, COPD, DVT, a heart failure, PE, pneumonia or a recent surgery.     Review of Systems   Constitution: Negative for chills, fever, malaise/fatigue and weight loss.   HENT: Negative for congestion, ear pain, hoarse voice, postnasal drip, rhinorrhea and sore throat.    Eyes: Negative for discharge and redness.   Cardiovascular: Negative for chest pain, claudication, dyspnea on exertion, leg swelling, orthopnea and syncope.   Respiratory: Positive for cough, shortness of breath, sleep disturbances due to breathing and wheezing. Negative for hemoptysis and sputum production.    Skin: Negative for rash.   Musculoskeletal: Negative for myalgias and neck pain.   Gastrointestinal: Negative for abdominal pain, heartburn and nausea.   Neurological: Negative for headaches.       Objective:      Physical Exam   Constitutional: He is oriented to person, place, and time. He appears well-developed and well-nourished. He is cooperative.  Non-toxic appearance. He does not appear ill. No distress.   Obese   HENT:   Head: Normocephalic and atraumatic.   Right Ear: Hearing, tympanic membrane, external ear and ear canal normal.   Left Ear: Hearing, tympanic membrane, external ear and ear canal normal.   Nose: Nose normal. No mucosal edema, rhinorrhea or nasal deformity. No epistaxis. Right sinus exhibits no maxillary sinus tenderness and no frontal sinus tenderness. Left sinus exhibits no maxillary sinus tenderness and no frontal sinus tenderness.   Mouth/Throat: Uvula is midline, oropharynx is clear and moist and mucous membranes are normal. No trismus in the jaw. Normal dentition. No uvula swelling. No posterior oropharyngeal erythema.   Eyes: Conjunctivae and lids  are normal. No scleral icterus.   Sclera clear bilat   Neck: Trachea normal, full passive range of motion without pain and phonation normal. Neck supple.   Cardiovascular: Normal rate, regular rhythm, normal heart sounds, intact distal pulses and normal pulses.   Pulmonary/Chest: Effort normal and breath sounds normal. No respiratory distress.   Abdominal: Soft. Normal appearance and bowel sounds are normal. He exhibits no distension. There is no tenderness.   Musculoskeletal: Normal range of motion. He exhibits no edema or deformity.   Neurological: He is alert and oriented to person, place, and time. He exhibits normal muscle tone. Coordination normal.   Skin: Skin is warm, dry and intact. He is not diaphoretic. No pallor.   Psychiatric: He has a normal mood and affect. His speech is normal and behavior is normal. Judgment and thought content normal. Cognition and memory are normal.   Nursing note and vitals reviewed.      X-ray Chest Pa And Lateral    Result Date: 9/20/2018  EXAMINATION: XR CHEST PA AND LATERAL CLINICAL HISTORY: Acute bronchospasm TECHNIQUE: PA and lateral views of the chest were performed. COMPARISON: 07/15/2018 FINDINGS: Patient did not take in a deep inspiration and cardiac size is enlarged.  No obvious infiltrate or pleural effusion.     See above Electronically signed by: Nicko Walters MD Date:    09/20/2018 Time:    10:47    X-ray Finger 2 Or More Views Right    Result Date: 9/11/2018  EXAMINATION: XR FINGER 2 OR MORE VIEWS RIGHT CLINICAL HISTORY: Crushing injury of right thumb, initial encounter TECHNIQUE: Three views of the right thumb were obtained. COMPARISON: None FINDINGS: The bones are intact.  There is no evidence for acute fracture or bone destruction.  There is no evidence for dislocation. No radiopaque soft tissue foreign bodies are identified.     No evidence for acute fracture, bone destruction, dislocation, or radiopaque soft tissue foreign bodies. Electronically signed  by: Francisco Javier Campos MD Date:    09/11/2018 Time:    14:29    Patient rechecked by myself in clinic with pulse ox remaining 98%.  He states that he feels better with treatment.  His respirations are unlabored.  Breath sounds remain clear to auscultation.    Assessment:       1. Viral URI with cough    2. Shortness of breath    3. Cough        Plan:         Viral URI with cough  -     benzonatate (TESSALON) 200 MG capsule; Take 1 capsule (200 mg total) by mouth 3 (three) times daily as needed for Cough.  Dispense: 30 capsule; Refill: 0  -     fluticasone (FLONASE) 50 mcg/actuation nasal spray; 2 sprays (100 mcg total) by Each Nare route once daily.  Dispense: 1 Bottle; Refill: 0    Shortness of breath  -     X-Ray Chest PA And Lateral; Future; Expected date: 09/20/2018  -     ipratropium 0.02 % nebulizer solution 0.5 mg; Take 2.5 mLs (0.5 mg total) by nebulization one time.  -     albuterol nebulizer solution 2.5 mg; Take 3 mLs (2.5 mg total) by nebulization one time.  -     albuterol (PROVENTIL/VENTOLIN HFA) 90 mcg/actuation inhaler; Inhale 2 puffs into the lungs every 6 (six) hours as needed for Wheezing or Shortness of Breath. Rescue  Dispense: 1 Inhaler; Refill: 0  -     Ambulatory referral to Pulmonology  -     fluticasone (FLONASE) 50 mcg/actuation nasal spray; 2 sprays (100 mcg total) by Each Nare route once daily.  Dispense: 1 Bottle; Refill: 0    Cough  -     X-Ray Chest PA And Lateral; Future; Expected date: 09/20/2018  -     ipratropium 0.02 % nebulizer solution 0.5 mg; Take 2.5 mLs (0.5 mg total) by nebulization one time.  -     Discontinue: albuterol nebulizer solution 0.63 mg; Take 3 mLs (0.63 mg total) by nebulization every 6 (six) hours as needed for Wheezing.  -     fluticasone (FLONASE) 50 mcg/actuation nasal spray; 2 sprays (100 mcg total) by Each Nare route once daily.  Dispense: 1 Bottle; Refill: 0      Patient Instructions   Follow up closely with Pulmonology and PCP.  Go to the ER for any  worsening symptoms including chest pain with shortness of breath or breaking out in a sweat.  Viral Upper Respiratory Illness with Wheezing (Adult)  You have a viral upper respiratory illness (URI), which is another term for the common cold. When the infection causes a lot of irritation, the air passages can go into spasm. This causes wheezing and shortness of breath.    This illness is contagious during the first few days. It is spread through the air by coughing and sneezing. It may also be spread by direct contact (touching the sick person and then touching your own eyes, nose, or mouth). Frequent handwashing will decrease the risk.  Most viral illnesses go away within 7 to 10 days with rest and simple home remedies. Sometimes the illness may last for several weeks. Antibiotics will not kill a virus, and they are generally not prescribed for this condition.  Home care  · If symptoms are severe, rest at home for the first 2 to 3 days. When you resume activity, don't let yourself get too tired.  · Avoid being exposed to cigarette smoke (yours or others).  · You may use acetaminophen or ibuprofen to control pain and fever, unless another medicine was prescribed. (Note: If you have chronic liver or kidney disease, have ever had a stomach ulcer or gastrointestinal bleeding, or are taking blood-thinning medicines, talk with your healthcare provider before using these medicines.) Aspirin should never be given to anyone under 18 years of age who is ill with a viral infection or fever. It may cause severe liver or brain damage.  · Your appetite may be poor, so a light diet is fine. Avoid dehydration by drinking 6 to 8 glasses of fluids per day (water, soft drinks, juices, tea, or soup). Extra fluids will help loosen secretions in the nose and lungs.  · Over-the-counter cold medicines will not shorten the length of time youre sick, but they may be helpful for the following symptoms: cough, sore throat, and nasal and  sinus congestion. (Note: Do not use decongestants if you have high blood pressure.)  Follow-up care  Follow up with your healthcare provider, or as advised.  When to seek medical advice  Call your healthcare provider right away if any of these occur:  · Cough with lots of colored sputum (mucus)  · Severe headache; face, neck, or ear pain  · Difficulty swallowing due to throat pain  · Fever of 100.4ºF (38ºC) or higher, or as directed by your healthcare provider  Call 911, or get immediate medical care  Call emergency services right away if any of these occur:  · Chest pain, shortness of breath, worsening wheezing, or difficulty breathing  · Coughing up blood  · Inability to swallow due to throat pain  Date Last Reviewed: 9/13/2015  © 6319-4454 [employer]. [address] [place] [address], [place] [address]. All rights reserved. This information is not intended as a substitute for professional medical care. Always follow your healthcare professional's instructions.

## 2018-09-20 NOTE — PATIENT INSTRUCTIONS
Follow up closely with Pulmonology and PCP.  Go to the ER for any worsening symptoms including chest pain with shortness of breath or breaking out in a sweat.  Viral Upper Respiratory Illness with Wheezing (Adult)  You have a viral upper respiratory illness (URI), which is another term for the common cold. When the infection causes a lot of irritation, the air passages can go into spasm. This causes wheezing and shortness of breath.    This illness is contagious during the first few days. It is spread through the air by coughing and sneezing. It may also be spread by direct contact (touching the sick person and then touching your own eyes, nose, or mouth). Frequent handwashing will decrease the risk.  Most viral illnesses go away within 7 to 10 days with rest and simple home remedies. Sometimes the illness may last for several weeks. Antibiotics will not kill a virus, and they are generally not prescribed for this condition.  Home care  · If symptoms are severe, rest at home for the first 2 to 3 days. When you resume activity, don't let yourself get too tired.  · Avoid being exposed to cigarette smoke (yours or others).  · You may use acetaminophen or ibuprofen to control pain and fever, unless another medicine was prescribed. (Note: If you have chronic liver or kidney disease, have ever had a stomach ulcer or gastrointestinal bleeding, or are taking blood-thinning medicines, talk with your healthcare provider before using these medicines.) Aspirin should never be given to anyone under 18 years of age who is ill with a viral infection or fever. It may cause severe liver or brain damage.  · Your appetite may be poor, so a light diet is fine. Avoid dehydration by drinking 6 to 8 glasses of fluids per day (water, soft drinks, juices, tea, or soup). Extra fluids will help loosen secretions in the nose and lungs.  · Over-the-counter cold medicines will not shorten the length of time youre sick, but they may be helpful  for the following symptoms: cough, sore throat, and nasal and sinus congestion. (Note: Do not use decongestants if you have high blood pressure.)  Follow-up care  Follow up with your healthcare provider, or as advised.  When to seek medical advice  Call your healthcare provider right away if any of these occur:  · Cough with lots of colored sputum (mucus)  · Severe headache; face, neck, or ear pain  · Difficulty swallowing due to throat pain  · Fever of 100.4ºF (38ºC) or higher, or as directed by your healthcare provider  Call 911, or get immediate medical care  Call emergency services right away if any of these occur:  · Chest pain, shortness of breath, worsening wheezing, or difficulty breathing  · Coughing up blood  · Inability to swallow due to throat pain  Date Last Reviewed: 9/13/2015  © 4032-3677 [employer]. [address] [place] [id number]. All rights reserved. This information is not intended as a substitute for professional medical care. Always follow your healthcare professional's instructions.

## 2018-09-21 DIAGNOSIS — E11.9 TYPE 2 DIABETES MELLITUS WITHOUT COMPLICATION: ICD-10-CM

## 2018-10-19 ENCOUNTER — PATIENT MESSAGE (OUTPATIENT)
Dept: INTERNAL MEDICINE | Facility: CLINIC | Age: 38
End: 2018-10-19

## 2018-10-19 DIAGNOSIS — I10 BENIGN ESSENTIAL HYPERTENSION: Primary | ICD-10-CM

## 2018-10-19 RX ORDER — OLMESARTAN MEDOXOMIL AND HYDROCHLOROTHIAZIDE 40/25 40; 25 MG/1; MG/1
1 TABLET ORAL DAILY
Qty: 90 TABLET | Refills: 3 | Status: SHIPPED | OUTPATIENT
Start: 2018-10-19 | End: 2019-04-17

## 2018-10-24 ENCOUNTER — OFFICE VISIT (OUTPATIENT)
Dept: INTERNAL MEDICINE | Facility: CLINIC | Age: 38
End: 2018-10-24
Payer: COMMERCIAL

## 2018-10-24 ENCOUNTER — IMMUNIZATION (OUTPATIENT)
Dept: INTERNAL MEDICINE | Facility: CLINIC | Age: 38
End: 2018-10-24
Payer: COMMERCIAL

## 2018-10-24 ENCOUNTER — TELEPHONE (OUTPATIENT)
Dept: INTERNAL MEDICINE | Facility: CLINIC | Age: 38
End: 2018-10-24

## 2018-10-24 ENCOUNTER — LAB VISIT (OUTPATIENT)
Dept: LAB | Facility: HOSPITAL | Age: 38
End: 2018-10-24
Attending: INTERNAL MEDICINE
Payer: COMMERCIAL

## 2018-10-24 ENCOUNTER — PATIENT MESSAGE (OUTPATIENT)
Dept: INTERNAL MEDICINE | Facility: CLINIC | Age: 38
End: 2018-10-24

## 2018-10-24 VITALS
BODY MASS INDEX: 46.65 KG/M2 | HEIGHT: 69 IN | TEMPERATURE: 99 F | WEIGHT: 315 LBS | DIASTOLIC BLOOD PRESSURE: 72 MMHG | OXYGEN SATURATION: 98 % | SYSTOLIC BLOOD PRESSURE: 126 MMHG | HEART RATE: 86 BPM

## 2018-10-24 DIAGNOSIS — R07.89 CHEST TIGHTNESS: ICD-10-CM

## 2018-10-24 DIAGNOSIS — I10 BENIGN ESSENTIAL HYPERTENSION: ICD-10-CM

## 2018-10-24 DIAGNOSIS — G47.33 OSA (OBSTRUCTIVE SLEEP APNEA): ICD-10-CM

## 2018-10-24 DIAGNOSIS — F41.9 ANXIETY: ICD-10-CM

## 2018-10-24 DIAGNOSIS — R07.89 CHEST TIGHTNESS: Primary | ICD-10-CM

## 2018-10-24 DIAGNOSIS — R06.02 SHORTNESS OF BREATH: ICD-10-CM

## 2018-10-24 DIAGNOSIS — E11.9 TYPE 2 DIABETES MELLITUS WITHOUT COMPLICATION: ICD-10-CM

## 2018-10-24 LAB
ALBUMIN SERPL BCP-MCNC: 4.2 G/DL
ALP SERPL-CCNC: 91 U/L
ALT SERPL W/O P-5'-P-CCNC: 19 U/L
ANION GAP SERPL CALC-SCNC: 12 MMOL/L
AST SERPL-CCNC: 17 U/L
BASOPHILS # BLD AUTO: 0.04 K/UL
BASOPHILS NFR BLD: 0.4 %
BILIRUB SERPL-MCNC: 0.9 MG/DL
BUN SERPL-MCNC: 14 MG/DL
CALCIUM SERPL-MCNC: 9.9 MG/DL
CHLORIDE SERPL-SCNC: 102 MMOL/L
CO2 SERPL-SCNC: 25 MMOL/L
CREAT SERPL-MCNC: 0.8 MG/DL
D DIMER PPP IA.FEU-MCNC: 0.35 MG/L FEU
DIFFERENTIAL METHOD: ABNORMAL
EOSINOPHIL # BLD AUTO: 0.9 K/UL
EOSINOPHIL NFR BLD: 9.1 %
ERYTHROCYTE [DISTWIDTH] IN BLOOD BY AUTOMATED COUNT: 13.2 %
EST. GFR  (AFRICAN AMERICAN): >60 ML/MIN/1.73 M^2
EST. GFR  (NON AFRICAN AMERICAN): >60 ML/MIN/1.73 M^2
ESTIMATED AVG GLUCOSE: 137 MG/DL
GLUCOSE SERPL-MCNC: 128 MG/DL
HBA1C MFR BLD HPLC: 6.4 %
HCT VFR BLD AUTO: 40.4 %
HGB BLD-MCNC: 13.2 G/DL
LYMPHOCYTES # BLD AUTO: 3.4 K/UL
LYMPHOCYTES NFR BLD: 34.6 %
MCH RBC QN AUTO: 30.3 PG
MCHC RBC AUTO-ENTMCNC: 32.7 G/DL
MCV RBC AUTO: 93 FL
MONOCYTES # BLD AUTO: 0.8 K/UL
MONOCYTES NFR BLD: 8.3 %
NEUTROPHILS # BLD AUTO: 4.6 K/UL
NEUTROPHILS NFR BLD: 47.4 %
PLATELET # BLD AUTO: 389 K/UL
PMV BLD AUTO: 9.2 FL
POTASSIUM SERPL-SCNC: 3.6 MMOL/L
PROT SERPL-MCNC: 8 G/DL
RBC # BLD AUTO: 4.35 M/UL
SODIUM SERPL-SCNC: 139 MMOL/L
TSH SERPL DL<=0.005 MIU/L-ACNC: 0.96 UIU/ML
WBC # BLD AUTO: 9.71 K/UL

## 2018-10-24 PROCEDURE — 3078F DIAST BP <80 MM HG: CPT | Mod: CPTII,S$GLB,, | Performed by: INTERNAL MEDICINE

## 2018-10-24 PROCEDURE — 90471 IMMUNIZATION ADMIN: CPT | Mod: S$GLB,,, | Performed by: INTERNAL MEDICINE

## 2018-10-24 PROCEDURE — 80053 COMPREHEN METABOLIC PANEL: CPT

## 2018-10-24 PROCEDURE — 3074F SYST BP LT 130 MM HG: CPT | Mod: CPTII,S$GLB,, | Performed by: INTERNAL MEDICINE

## 2018-10-24 PROCEDURE — 36415 COLL VENOUS BLD VENIPUNCTURE: CPT

## 2018-10-24 PROCEDURE — 85379 FIBRIN DEGRADATION QUANT: CPT

## 2018-10-24 PROCEDURE — 99999 PR PBB SHADOW E&M-EST. PATIENT-LVL IV: CPT | Mod: PBBFAC,,, | Performed by: INTERNAL MEDICINE

## 2018-10-24 PROCEDURE — 90686 IIV4 VACC NO PRSV 0.5 ML IM: CPT | Mod: S$GLB,,, | Performed by: INTERNAL MEDICINE

## 2018-10-24 PROCEDURE — 3008F BODY MASS INDEX DOCD: CPT | Mod: CPTII,S$GLB,, | Performed by: INTERNAL MEDICINE

## 2018-10-24 PROCEDURE — 99214 OFFICE O/P EST MOD 30 MIN: CPT | Mod: S$GLB,,, | Performed by: INTERNAL MEDICINE

## 2018-10-24 PROCEDURE — 83036 HEMOGLOBIN GLYCOSYLATED A1C: CPT

## 2018-10-24 PROCEDURE — 85025 COMPLETE CBC W/AUTO DIFF WBC: CPT

## 2018-10-24 PROCEDURE — 84443 ASSAY THYROID STIM HORMONE: CPT

## 2018-10-24 RX ORDER — ESCITALOPRAM OXALATE 10 MG/1
10 TABLET ORAL DAILY
Qty: 30 TABLET | Refills: 5 | Status: SHIPPED | OUTPATIENT
Start: 2018-10-24 | End: 2018-12-28

## 2018-10-24 NOTE — PROGRESS NOTES
"Subjective:       Patient ID: Nain Jain is a 38 y.o. male.    Chief Complaint: Chest Pain    HPI urgent  C/o constant chest pressure and SOB x 3-4 days. Worse w/ laying down. Sometimes SOB causes sweating. This makes his anxiety worse. Has been seen in UC and ED several times for this. No wheezing. Cough - nonproductive and only sometimes.   Worse w/ exertion.   Reviewed CXR x 2 - 7/15/18 and 9/20/18  Neg stress test 5/10/17.  EKGs reviewed from 5/22/18 and 7/2018.     Reports no new stressors. Pt reports tried xanax but didn't help. Also had breathing treatment in the ED, which didn't help.     No leg swelling. No long drives/plane rides. No fevers/chills.     HTN - compliant w/ meds.   Most recently BP was elevated. Increased olmesartan-hct from 40-12.5 to 40-25mg daily.     WALTER - on sleep apnea machine but sometimes can't use CPAP as it makes his symptoms worse.       Review of Systems  Comprehensive review of systems otherwise negative. See history/subjective section for more details.    Objective:      Physical Exam    /72 (BP Location: Left arm, Patient Position: Sitting, BP Method: Large (Manual))   Pulse 86   Temp 98.8 °F (37.1 °C)   Ht 5' 9" (1.753 m)   Wt (!) 155.3 kg (342 lb 4.8 oz)   SpO2 98%   BMI 50.55 kg/m²     GEN - A+OX4, NAD   HEENT - PERRL, EOMI, OP clear but crowding. TM normal.   Neck - No thyromegaly or cervical LAD. No thyroid masses felt.  CV - RRR, no m/r   Chest - CTAB, no wheezing or rhonchi  Abd - S/NT/ND/+BS.   Ext - 2+BDP and radial pulses. No LE edema.  Neuro - 5/5 BUE and BLE strength. 2+ DTRs.  Skin - No rash.    Labs and imaging reviewed.    Assessment/Plan     Nain was seen today for chest pain.    Diagnoses and all orders for this visit:    Chest tightness - f/u w/ pulm. Likely anxiety playing a role also. Will get CBC, CMP, TSH and D dimer though. Has PFT scheduled for tomorrow. Pending further work up.  -     Comprehensive metabolic panel; Future  -     CBC " auto differential; Future  -     TSH; Future  -     D dimer, quantitative; Future    Shortness of breath - as above.     Anxiety  -     escitalopram oxalate (LEXAPRO) 10 MG tablet; Take 1 tablet (10 mg total) by mouth once daily.    Benign essential hypertension - Stable and controlled. Continue current medications.    WALTER (obstructive sleep apnea) - benefiting from CPAP. Will cont to use.     Flu vaccine today.    Follow-up in about 3 weeks (around 11/14/2018).      Huyen Arias MD  Department of Internal Medicine - [facility]  11:03 AM

## 2018-10-24 NOTE — TELEPHONE ENCOUNTER
----- Message from Tonia Alicea sent at 10/24/2018  8:30 AM CDT -----  Contact: [phone]  Patient returned a call to Yesy.  Patient stated yes he can come in for 11am today to see the doctor.     Please advise, thank you

## 2018-10-24 NOTE — LETTER
October 24, 2018      [facility] - Internal Medicine  [address]  [facility] [address]  Phone: [phone]  Fax: [phone]       Patient: Nain Jain   YOB: 1980  Date of Visit: 10/24/2018    To Whom It May Concern:    Barrera Jain  was at [facility] on 10/24/2018. He may return to work/school on 10/25/18 with no restrictions. If you have any questions or concerns, or if I can be of further assistance, please do not hesitate to contact me.    Sincerely,        Huyen Arias MD

## 2018-10-25 ENCOUNTER — HOSPITAL ENCOUNTER (OUTPATIENT)
Dept: PULMONOLOGY | Facility: CLINIC | Age: 38
Discharge: HOME | End: 2018-10-25
Payer: COMMERCIAL

## 2018-10-25 ENCOUNTER — OFFICE VISIT (OUTPATIENT)
Dept: PULMONOLOGY | Facility: CLINIC | Age: 38
End: 2018-10-25
Payer: COMMERCIAL

## 2018-10-25 VITALS
BODY MASS INDEX: 46.65 KG/M2 | BODY MASS INDEX: 46.65 KG/M2 | HEIGHT: 69 IN | WEIGHT: 315 LBS | HEIGHT: 69 IN | WEIGHT: 315 LBS | HEART RATE: 86 BPM | DIASTOLIC BLOOD PRESSURE: 100 MMHG | OXYGEN SATURATION: 97 % | SYSTOLIC BLOOD PRESSURE: 158 MMHG

## 2018-10-25 DIAGNOSIS — R06.02 SOB (SHORTNESS OF BREATH): Primary | ICD-10-CM

## 2018-10-25 DIAGNOSIS — J84.9 INTERSTITIAL LUNG DISEASE: ICD-10-CM

## 2018-10-25 DIAGNOSIS — R06.02 SOB (SHORTNESS OF BREATH): ICD-10-CM

## 2018-10-25 LAB
PRE FEV1 FVC: 78
PRE FEV1: 3.09
PRE FVC: 3.98
PREDICTED FEV1 FVC: 83
PREDICTED FEV1: 3.98
PREDICTED FVC: 4.81

## 2018-10-25 PROCEDURE — 3080F DIAST BP >= 90 MM HG: CPT | Mod: CPTII,S$GLB,, | Performed by: INTERNAL MEDICINE

## 2018-10-25 PROCEDURE — 94618 PULMONARY STRESS TESTING: CPT | Mod: S$GLB,,, | Performed by: INTERNAL MEDICINE

## 2018-10-25 PROCEDURE — 3008F BODY MASS INDEX DOCD: CPT | Mod: CPTII,S$GLB,, | Performed by: INTERNAL MEDICINE

## 2018-10-25 PROCEDURE — 99999 PR PBB SHADOW E&M-EST. PATIENT-LVL III: CPT | Mod: PBBFAC,,, | Performed by: INTERNAL MEDICINE

## 2018-10-25 PROCEDURE — 3077F SYST BP >= 140 MM HG: CPT | Mod: CPTII,S$GLB,, | Performed by: INTERNAL MEDICINE

## 2018-10-25 PROCEDURE — 94729 DIFFUSING CAPACITY: CPT | Mod: S$GLB,,, | Performed by: INTERNAL MEDICINE

## 2018-10-25 PROCEDURE — 94010 BREATHING CAPACITY TEST: CPT | Mod: S$GLB,,, | Performed by: INTERNAL MEDICINE

## 2018-10-25 PROCEDURE — 99205 OFFICE O/P NEW HI 60 MIN: CPT | Mod: 25,S$GLB,, | Performed by: INTERNAL MEDICINE

## 2018-10-25 RX ORDER — FLUTICASONE PROPIONATE 100 UG/1
2 POWDER, METERED RESPIRATORY (INHALATION) 2 TIMES DAILY
Qty: 2 INHALER | Refills: 11 | Status: SHIPPED | OUTPATIENT
Start: 2018-10-25 | End: 2019-04-18 | Stop reason: CLARIF

## 2018-10-25 NOTE — PROGRESS NOTES
"Subjective:       Patient ID: Nain Jain is a 38 y.o. male.    Chief Complaint: Shortness of Breath    HPI   Mr. Nain Jain is a 39 yo M with Morbid obesity, HTN, DM, HLD, WALTER, Anemia presents today with  SOB, chest tightness and burning  which started in May 2018 suddenly. This coincides with adding bleach to his return air system due to "bad odor" in house. Afterwards, he felt ill, SOB, chest tightness. He had his air ducts replaced which was found to be infested with mold. His SOB persisted and has wax and wane. It has been constant this month.  He was seen in the ED a couple of times, UC and his PCP. No improvement noted on omeprazole, bronchodilators, xanax or nasal steroids. He reports steroidal injection helped for 1 week. He has been up anxious at night and finds that lexapro which he received yesterday helps. He denies reflux, denies cough, denies pain.     He is a . No foreign travel, never smoked, no pets. He reports his SOB worsen when his wife sprays perfume or use certain lotion. House  also seem to trigger his symptoms.  He has a son and daughter with asthma.     Review of Systems   Constitutional: Positive for fatigue and night sweats. Negative for chills, activity change and appetite change.   HENT: Positive for voice change and congestion. Negative for postnasal drip and trouble swallowing.    Respiratory: Positive for apnea (resolve with cpap), snoring, shortness of breath, orthopnea and dyspnea on extertion (worsen). Negative for cough, choking and wheezing.         Burning, tightness in chest   Cardiovascular: Positive for palpitations. Negative for chest pain and leg swelling.   Musculoskeletal: Negative for gait problem.   Gastrointestinal: Negative for nausea, vomiting, abdominal pain and acid reflux.   Neurological: Negative for headaches.   Psychiatric/Behavioral: Positive for sleep disturbance. The patient is nervous/anxious.        Objective:      Physical Exam "   Constitutional: He is oriented to person, place, and time. He appears well-developed. No distress. He is obese.   HENT:   Head: Normocephalic.   Nose: Mucosal edema (nasal congestion) present.   Mouth/Throat: Oropharynx is clear and moist. Mallampati Score: IV.   Neck: Neck supple.   Cardiovascular: Normal rate, regular rhythm and normal heart sounds.   Pulmonary/Chest: Normal expansion, effort normal and breath sounds normal.   Abdominal: Soft. There is no tenderness. There is no guarding.   Musculoskeletal: He exhibits no edema.   Neurological: He is alert and oriented to person, place, and time. Gait normal.   Skin: Skin is warm. He is not diaphoretic.   Psychiatric: He has a normal mood and affect. His behavior is normal. Thought content normal.     Personal Diagnostic Review    PFT today: FVC 3.98 (83%), FEV1 2.46 (80%), FEV1/FVC 77%, DLCO 44.5 (148%)  6 minute walk test: oxygen saturation remained above 98% after exertion  Chest xray 9/20/2018 : No obvious infiltrate or pleural effusion.  Stress ECHO:1 - Normal left ventricular systolic function (EF 60-65%).     2 - No wall motion abnormalities.     3 - Normal left ventricular diastolic function.     4 - Normal right ventricular systolic function .   5/22/2018 BNP<10  Cr. 0.8  Eos 9.1    Assessment:       1. SOB (shortness of breath)            Outpatient Encounter Medications as of 10/25/2018   Medication Sig Dispense Refill    acyclovir (ZOVIRAX) 400 MG tablet Take 1 tablet (400 mg total) by mouth 2 (two) times daily. 60 tablet 11    albuterol (PROVENTIL/VENTOLIN HFA) 90 mcg/actuation inhaler Inhale 2 puffs into the lungs every 6 (six) hours as needed for Wheezing or Shortness of Breath. Rescue 1 Inhaler 0    albuterol 90 mcg/actuation inhaler Inhale 1-2 puffs into the lungs every 6 (six) hours as needed for Wheezing. 1 Inhaler 0    ALPRAZolam (XANAX) 0.5 MG tablet Take 1 tablet (0.5 mg total) by mouth 3 (three) times daily as needed for Anxiety. 30  tablet 0    amLODIPine (NORVASC) 10 MG tablet Take 1 tablet (10 mg total) by mouth every morning. 90 tablet 3    escitalopram oxalate (LEXAPRO) 10 MG tablet Take 1 tablet (10 mg total) by mouth once daily. 30 tablet 5    fluticasone (FLONASE) 50 mcg/actuation nasal spray 2 sprays (100 mcg total) by Each Nare route once daily. 1 Bottle 0    metFORMIN (FORTAMET) 500 mg 24 hr tablet Take 1 tablet (500 mg total) by mouth daily with breakfast. 90 tablet 3    olmesartan-hydrochlorothiazide (BENICAR HCT) 40-25 mg per tablet Take 1 tablet by mouth once daily. 90 tablet 3    omeprazole (PRILOSEC) 20 MG capsule Take 1 capsule (20 mg total) by mouth once daily. 30 capsule 0    rosuvastatin (CRESTOR) 40 MG Tab Take 1 tablet (40 mg total) by mouth every evening. 90 tablet 3    fluticasone (FLOVENT DISKUS) 100 mcg/actuation inhaler Inhale 2 puffs (200 mcg total) into the lungs 2 (two) times daily. Controller 2 Inhaler 11     No facility-administered encounter medications on file as of 10/25/2018.      Orders Placed This Encounter   Procedures    CT Chest Without Contrast     Standing Status:   Future     Standing Expiration Date:   10/25/2019     Order Specific Question:   May the Radiologist modify the order per protocol to meet the clinical needs of the patient?     Answer:   Yes     Plan:       Problem List Items Addressed This Visit        Unprioritized    SOB (shortness of breath) - Primary    Overview     The patient has symptoms of chronic SOB that worsen with exposure certain inhalants and varies in severity. He has a family history asthma. He responds to steroid. This is consistent with RAD. However, his poor response to bronchodilator is atypical.          Current Assessment & Plan     1. Trial inhaled steroid  2. Obtain CT chest to assess for ILD.

## 2018-10-25 NOTE — PROCEDURES
Nain Jain is a 38 y.o.  male patient, who presents for a 6 minute walk test ordered by Brent Barragan MD.  The diagnosis is Shortness of Breath.  The patient's BMI is 50.7 kg/m2.  Predicted distance (lower limit of normal) is 424.97 meters.      Test Results:    The test was completed without stopping.  The total time walked was 360 seconds.  During walking, the patient reported:  Dyspnea.  The patient used no assistive devices during testing.     10/25/2018---------Distance: 466.34 meters (1530 feet)     O2 Sat % Supplemental Oxygen Heart Rate Blood Pressure Abdelrahman Scale   Pre-exercise  (Resting) 96 % Room Air 90 bpm 134/77 mmHg 3   During Exercise 98 % Room Air 129 bpm 174/80 mmHg 4   Post-exercise  (Recovery) 98 % Room Air  96 bpm 160/73 mmHg      Recovery Time:  257 seconds    Performing nurse/tech:  Estopinal RRT      PREVIOUS STUDY:   The patient has not had a previous study.      CLINICAL INTERPRETATION:  Six minute walk distance is 466.34 meters (1530 feet) with somewhat heavy dyspnea.  During exercise, there was no desaturation while breathing room air.  Both blood pressure and heart rate increased significantly with walking.  The patient did not report non-pulmonary symptoms during exercise.  No previous study performed.  Based upon age and body mass index, exercise capacity is normal.

## 2018-11-12 ENCOUNTER — TELEPHONE (OUTPATIENT)
Dept: PULMONOLOGY | Facility: CLINIC | Age: 38
End: 2018-11-12

## 2018-11-12 NOTE — TELEPHONE ENCOUNTER
Returned patient call to let him know that I received his message and that I was just confirming that he did want to cancel his appointment. Patient stated that he wanted to cancel appointment that was scheduled on 11/13 for his ct.

## 2018-11-30 ENCOUNTER — TELEPHONE (OUTPATIENT)
Dept: UROLOGY | Facility: CLINIC | Age: 38
End: 2018-11-30

## 2018-11-30 ENCOUNTER — PATIENT MESSAGE (OUTPATIENT)
Dept: INTERNAL MEDICINE | Facility: CLINIC | Age: 38
End: 2018-11-30

## 2018-11-30 NOTE — TELEPHONE ENCOUNTER
----- Message from Estephanie Marques sent at 11/30/2018  8:33 AM CST -----  Contact: Pt    Name of Who is Calling:CONSTANCE ROWLAND [[id number]]    What is the request in detail: Patient states he would like to have a vasectomy  Please contact to further discuss and advise    Can the clinic reply by [facility]: Yes    What Number to Call Back if not in [facility]: [phone]

## 2018-12-04 ENCOUNTER — TELEPHONE (OUTPATIENT)
Dept: PULMONOLOGY | Facility: HOSPITAL | Age: 38
End: 2018-12-04

## 2018-12-04 ENCOUNTER — OFFICE VISIT (OUTPATIENT)
Dept: UROLOGY | Facility: CLINIC | Age: 38
End: 2018-12-04
Attending: UROLOGY
Payer: COMMERCIAL

## 2018-12-04 VITALS
HEIGHT: 69 IN | WEIGHT: 315 LBS | SYSTOLIC BLOOD PRESSURE: 123 MMHG | DIASTOLIC BLOOD PRESSURE: 67 MMHG | HEART RATE: 84 BPM | BODY MASS INDEX: 46.65 KG/M2

## 2018-12-04 DIAGNOSIS — Z30.09 ENCOUNTER FOR VASECTOMY COUNSELING: Primary | ICD-10-CM

## 2018-12-04 PROCEDURE — 3078F DIAST BP <80 MM HG: CPT | Mod: CPTII,S$GLB,, | Performed by: UROLOGY

## 2018-12-04 PROCEDURE — 99204 OFFICE O/P NEW MOD 45 MIN: CPT | Mod: S$GLB,,, | Performed by: UROLOGY

## 2018-12-04 PROCEDURE — 3008F BODY MASS INDEX DOCD: CPT | Mod: CPTII,S$GLB,, | Performed by: UROLOGY

## 2018-12-04 PROCEDURE — 3074F SYST BP LT 130 MM HG: CPT | Mod: CPTII,S$GLB,, | Performed by: UROLOGY

## 2018-12-04 RX ORDER — DIAZEPAM 5 MG/1
10 TABLET ORAL ONCE
Qty: 2 TABLET | Refills: 0 | Status: SHIPPED | OUTPATIENT
Start: 2018-12-04 | End: 2018-12-04

## 2018-12-04 RX ORDER — HYDROCODONE BITARTRATE AND ACETAMINOPHEN 5; 325 MG/1; MG/1
1 TABLET ORAL EVERY 6 HOURS PRN
Qty: 10 TABLET | Refills: 0 | Status: SHIPPED | OUTPATIENT
Start: 2018-12-04 | End: 2019-04-15

## 2018-12-04 NOTE — LETTER
December 4, 2018    Nain Jain  [address] [address]         [facility]  [address]  [place] [address]  Phone: [phone]  Fax: [phone] December 4, 2018     Patient: Nain Jain   YOB: 1980   Date of Visit: 12/4/2018       To Whom It May Concern:    Nain Jain is scheduled for a medical procedure on Monday December 10, 2018.  He will be cleared to return to work without restrictions on Friday December 14, 2018.    If you have any questions or concerns, please don't hesitate to call.    Sincerely,        Fernando Robles MD

## 2018-12-04 NOTE — PROGRESS NOTES
"Subjective:      Mr. Jain is a 38 y.o.  male who presents for evaluation regarding vasectomy. He has 6 children and he is certain that he desires no more. He is aware of other forms of contraception. He is aware that vasectomy is to be considered permanent/irreversible.    He denies orchalgia and history of testicular surgery or infection.    The following portions of the patient's history were reviewed and updated as appropriate: allergies, current medications, past family history, past medical history, past social history, past surgical history and problem list.    Review of Systems  Constitutional: no fever or chills  ENT: no nasal congestion or sore throat  Respiratory: no cough or shortness of breath  Cardiovascular: no chest pain or palpitations  Gastrointestinal: no nausea or vomiting, tolerating diet  Genitourinary: as per HPI  Hematologic/Lymphatic: no easy bruising or lymphadenopathy  Musculoskeletal: no arthralgias or myalgias  Neurological: no seizures or tremors  Behavioral/Psych: no auditory or visual hallucinations     Objective:   Vitals: /67 (BP Location: Left arm, Patient Position: Sitting, BP Method: X-Large (Automatic))   Pulse 84   Ht 5' 9" (1.753 m)   Wt (!) 155.1 kg (341 lb 14.9 oz)   BMI 50.49 kg/m²     Physical Exam   General: alert and oriented, no acute distress  Head: normocephalic, atraumatic  Neck: supple, no lymphadenopathy, normal ROM, no masses  Respiratory: Symmetric expansion, non-labored breathing  Cardiovascular: regular rate and rhythm, nomal pulses, no peripheral edema  Abdomen: soft, non tender, non distended, no palpable masses, no hernias, no hepatomegaly or splenomegaly  Genitourinary:   Penis: normal, no lesions, patent orthotopic meatus, no plaques  Scrotum: no rashes or skin changes;   Testes: descended bilaterally, no masses, nontender, normal epididymides bilaterally, no hydroceles  Lymphatic: no inguinal nodes  Skin: normal coloration and turgor, no " rashes, no suspicious skin lesions noted  Neuro: alert and oriented x3, no gross deficits  Psych: normal judgment and insight, normal mood/affect and non-anxious    Assessment:   Nain Jain is a 38 y.o. male who presents for evaluation regarding vasectomy.    Plan:   1. I discussed with the patient risks and benefits, as well as alternatives. We discussed possible complications at length, including fever, infection, bleeding--possibly requiring reoperation,testicular injury or atrophy with loss of function, chronic pain, persistent or recurrent presence of sperm in the ejaculate, vasal recanalization, as well as the risks attendant to the anesthetic.  2. We discussed that he should stop aspirin, ibuprofen, and similar products at least one week prior to the procedure. Acetominophen is okay to use for headaches, pain, etc.  3. I recommended that he bring an athletic supporter on the day of the procedure.  4. We discussed that he must continue to use contraception for 3 months and until semen analyses reveals azoospermia.  5. He will schedule an elective vasectomy.  6. Prescription provided for valium with instructions to take upon arrival at office the day of the procedure; also for norco for post-procedure pain.  7. Consent signed and scanned into chart.

## 2018-12-04 NOTE — TELEPHONE ENCOUNTER
Follow up call:  Patient is feeling dramatically improved since starting the ICS inhaler. This is in keeping with his prior response to steroids. No further evaluation needed at this time. I will ask Mr Jain to follow up with me in one year.    Bernt Barragan MD  [facility]

## 2018-12-10 ENCOUNTER — PROCEDURE VISIT (OUTPATIENT)
Dept: UROLOGY | Facility: CLINIC | Age: 38
End: 2018-12-10
Attending: UROLOGY
Payer: COMMERCIAL

## 2018-12-10 VITALS
SYSTOLIC BLOOD PRESSURE: 145 MMHG | BODY MASS INDEX: 46.65 KG/M2 | DIASTOLIC BLOOD PRESSURE: 83 MMHG | HEART RATE: 75 BPM | WEIGHT: 315 LBS | HEIGHT: 69 IN

## 2018-12-10 DIAGNOSIS — Z30.09 ENCOUNTER FOR VASECTOMY COUNSELING: Primary | ICD-10-CM

## 2018-12-10 PROCEDURE — 55250 REMOVAL OF SPERM DUCT(S): CPT | Mod: S$GLB,,, | Performed by: UROLOGY

## 2018-12-10 RX ORDER — DIAZEPAM 5 MG/1
TABLET ORAL
COMMUNITY
Start: 2018-12-04 | End: 2019-04-18 | Stop reason: CLARIF

## 2018-12-10 RX ORDER — LIDOCAINE HYDROCHLORIDE AND EPINEPHRINE 20; 10 MG/ML; UG/ML
1 INJECTION, SOLUTION INFILTRATION; PERINEURAL
Status: COMPLETED | OUTPATIENT
Start: 2018-12-10 | End: 2018-12-10

## 2018-12-10 RX ADMIN — LIDOCAINE HYDROCHLORIDE AND EPINEPHRINE 5 ML: 20; 10 INJECTION, SOLUTION INFILTRATION; PERINEURAL at 10:12

## 2018-12-10 NOTE — PROCEDURES
"Vasectomy  Date/Time: 12/10/2018 10:04 AM  Performed by: Fernando Robles MD  Authorized by: Fernando Robles MD     Consent Done?:  Yes (Written)  Time out: Immediately prior to procedure a "time out" was called to verify the correct patient, procedure, equipment, support staff and site/side marked as required.    Indications:  [place] male  Position:  Dorsal lithotomy  Anesthesia:  Other (5cc 1% lidocaine)  Patient sedated: No    Preparation: Patient was prepped and draped in usual sterile fashion    Incisions:  2  Length vas excised:  2.5 cm  Vas:  Fulgurated and Buried  Sutures:  3-0 chromic SH (for fascial interposition)  Skin closures:  3-0 chromic SH  Same procedure performed on both sides    Patient tolerance:  Patient tolerated the procedure well with no immediate complications     Post-Procedure Care:  -- No heavy lifting for 5-7 days  -- No intercourse for 3-4 days  -- OK to shower tomorrow; no soaking in water for 7 days  -- Call for severe pain, bleeding, concern for infection    FU in 3 months for semen analysis.      "

## 2018-12-14 ENCOUNTER — PATIENT MESSAGE (OUTPATIENT)
Dept: UROLOGY | Facility: CLINIC | Age: 38
End: 2018-12-14

## 2018-12-27 ENCOUNTER — PATIENT MESSAGE (OUTPATIENT)
Dept: INTERNAL MEDICINE | Facility: CLINIC | Age: 38
End: 2018-12-27

## 2018-12-27 ENCOUNTER — NURSE TRIAGE (OUTPATIENT)
Dept: ADMINISTRATIVE | Facility: CLINIC | Age: 38
End: 2018-12-27

## 2018-12-28 ENCOUNTER — PATIENT MESSAGE (OUTPATIENT)
Dept: INTERNAL MEDICINE | Facility: CLINIC | Age: 38
End: 2018-12-28

## 2018-12-28 ENCOUNTER — LAB VISIT (OUTPATIENT)
Dept: LAB | Facility: HOSPITAL | Age: 38
End: 2018-12-28
Attending: INTERNAL MEDICINE
Payer: COMMERCIAL

## 2018-12-28 ENCOUNTER — TELEPHONE (OUTPATIENT)
Dept: INTERNAL MEDICINE | Facility: CLINIC | Age: 38
End: 2018-12-28

## 2018-12-28 ENCOUNTER — OFFICE VISIT (OUTPATIENT)
Dept: INTERNAL MEDICINE | Facility: CLINIC | Age: 38
End: 2018-12-28
Payer: COMMERCIAL

## 2018-12-28 VITALS
WEIGHT: 315 LBS | DIASTOLIC BLOOD PRESSURE: 84 MMHG | SYSTOLIC BLOOD PRESSURE: 130 MMHG | OXYGEN SATURATION: 97 % | HEART RATE: 77 BPM | TEMPERATURE: 98 F | BODY MASS INDEX: 46.65 KG/M2 | HEIGHT: 69 IN

## 2018-12-28 DIAGNOSIS — E66.9 DIABETES MELLITUS TYPE 2 IN OBESE: ICD-10-CM

## 2018-12-28 DIAGNOSIS — F41.9 ANXIETY: ICD-10-CM

## 2018-12-28 DIAGNOSIS — E11.69 DIABETES MELLITUS TYPE 2 IN OBESE: Primary | ICD-10-CM

## 2018-12-28 DIAGNOSIS — R73.9 HYPERGLYCEMIA: ICD-10-CM

## 2018-12-28 DIAGNOSIS — E11.69 DIABETES MELLITUS TYPE 2 IN OBESE: ICD-10-CM

## 2018-12-28 DIAGNOSIS — E66.9 DIABETES MELLITUS TYPE 2 IN OBESE: Primary | ICD-10-CM

## 2018-12-28 DIAGNOSIS — F43.21 ADJUSTMENT DISORDER WITH DEPRESSED MOOD: ICD-10-CM

## 2018-12-28 LAB
ALBUMIN SERPL BCP-MCNC: 4.3 G/DL
ALP SERPL-CCNC: 79 U/L
ALT SERPL W/O P-5'-P-CCNC: 30 U/L
ANION GAP SERPL CALC-SCNC: 12 MMOL/L
AST SERPL-CCNC: 27 U/L
B-OH-BUTYR BLD STRIP-SCNC: 0.3 MMOL/L
BILIRUB SERPL-MCNC: 0.9 MG/DL
BUN SERPL-MCNC: 16 MG/DL
CALCIUM SERPL-MCNC: 9.8 MG/DL
CHLORIDE SERPL-SCNC: 94 MMOL/L
CO2 SERPL-SCNC: 28 MMOL/L
CREAT SERPL-MCNC: 1 MG/DL
EST. GFR  (AFRICAN AMERICAN): >60 ML/MIN/1.73 M^2
EST. GFR  (NON AFRICAN AMERICAN): >60 ML/MIN/1.73 M^2
ESTIMATED AVG GLUCOSE: 237 MG/DL
GLUCOSE SERPL-MCNC: 286 MG/DL
HBA1C MFR BLD HPLC: 9.9 %
POTASSIUM SERPL-SCNC: 3.9 MMOL/L
PROT SERPL-MCNC: 8.2 G/DL
SODIUM SERPL-SCNC: 134 MMOL/L

## 2018-12-28 PROCEDURE — 3075F SYST BP GE 130 - 139MM HG: CPT | Mod: CPTII,S$GLB,, | Performed by: INTERNAL MEDICINE

## 2018-12-28 PROCEDURE — 82010 KETONE BODYS QUAN: CPT

## 2018-12-28 PROCEDURE — 3044F HG A1C LEVEL LT 7.0%: CPT | Mod: CPTII,S$GLB,, | Performed by: INTERNAL MEDICINE

## 2018-12-28 PROCEDURE — 80053 COMPREHEN METABOLIC PANEL: CPT

## 2018-12-28 PROCEDURE — 3008F BODY MASS INDEX DOCD: CPT | Mod: CPTII,S$GLB,, | Performed by: INTERNAL MEDICINE

## 2018-12-28 PROCEDURE — 83036 HEMOGLOBIN GLYCOSYLATED A1C: CPT

## 2018-12-28 PROCEDURE — 99999 PR PBB SHADOW E&M-EST. PATIENT-LVL IV: CPT | Mod: PBBFAC,,, | Performed by: INTERNAL MEDICINE

## 2018-12-28 PROCEDURE — 36415 COLL VENOUS BLD VENIPUNCTURE: CPT

## 2018-12-28 PROCEDURE — 3079F DIAST BP 80-89 MM HG: CPT | Mod: CPTII,S$GLB,, | Performed by: INTERNAL MEDICINE

## 2018-12-28 PROCEDURE — 99214 OFFICE O/P EST MOD 30 MIN: CPT | Mod: S$GLB,,, | Performed by: INTERNAL MEDICINE

## 2018-12-28 RX ORDER — METFORMIN HYDROCHLORIDE EXTENDED-RELEASE TABLETS 500 MG/1
1000 TABLET, FILM COATED, EXTENDED RELEASE ORAL 2 TIMES DAILY WITH MEALS
Qty: 360 TABLET | Refills: 3 | Status: SHIPPED | OUTPATIENT
Start: 2018-12-28 | End: 2020-09-24 | Stop reason: SDUPTHER

## 2018-12-28 RX ORDER — ESCITALOPRAM OXALATE 20 MG/1
20 TABLET ORAL DAILY
Qty: 30 TABLET | Refills: 11 | Status: SHIPPED | OUTPATIENT
Start: 2018-12-28 | End: 2019-12-23

## 2018-12-28 NOTE — PROGRESS NOTES
"Subjective:       Patient ID: Nain Jain is a 38 y.o. male.    Chief Complaint: Hyperglycemia    HPI   DM2 since 2015 - metformin XR 500mg qam.   Last a1c was 6.4 10/24/18.   Currently going through divorce and so has not been watching diet. Has been having frequent urination. Yesterday was the first time he checked his glucose in a while and it was 400s. Rechecked it this morning and it was 200s.  Intentionally trying to lose weight - weight loss of 8lbs. Walking more.   No blurry vision. No numbness/tingling/weakness. No fevers/chills/nausea/vomiting/abdominal pain.     Feels depressed. Does have baseline anxiety in which he's on lexapro and xanax prn. Doesn't have support - he's trying to isolate himself. Doesn't want to discuss w/ other people as he feels this is burdensome. No SI/HI.    Review of Systems  Comprehensive review of systems otherwise negative. See history/subjective section for more details.    Objective:      Physical Exam    /84 (BP Location: Right arm, Patient Position: Sitting, BP Method: Large (Manual))   Pulse 77   Temp 97.9 °F (36.6 °C) (Oral)   Ht 5' 9" (1.753 m)   Wt (!) 151.8 kg (334 lb 8.8 oz)   SpO2 97%   BMI 49.40 kg/m²     GEN - A+OX4, NAD   HEENT - PERRL, EOMI, OP clear. MMM.   Neck - No thyromegaly or cervical LAD. No thyroid masses felt.  CV - RRR, no m/r   Chest - CTAB, no wheezing or rhonchi  Abd - S/NT/ND/+BS.   Ext - 2+BDP and radial pulses. No LE edema.   Skin - No rash.    Labs reviewed.    Assessment/Plan     Nain was seen today for hyperglycemia.    Diagnoses and all orders for this visit:    Diabetes mellitus type 2 in obese  -     POCT Glucose, Hand-Held Device  -     Hemoglobin A1c; Future  -     metFORMIN (FORTAMET) 500 mg 24 hr tablet; Take 2 tablets (1,000 mg total) by mouth 2 (two) times daily with meals.    Hyperglycemia  -     Microalbumin/creatinine urine ratio; Future  -     Urinalysis; Future  -     Urinalysis Microscopic; Future  -     " Comprehensive metabolic panel; Future  -     BETA - HYDROXYBUTYRATE, SERUM; Future    Adjustment disorder with depressed mood  -     escitalopram oxalate (LEXAPRO) 20 MG tablet; Take 1 tablet (20 mg total) by mouth once daily.  -     Ambulatory consult to Psychology    Anxiety  -     escitalopram oxalate (LEXAPRO) 20 MG tablet; Take 1 tablet (20 mg total) by mouth once daily.  -     Ambulatory consult to Psychology    Check glucose daily and write down number on a piece of paper. Follow up with SANDI Martin in 3 weeks.     Follow-up in about 2 months (around 2/28/2019).      Huyen Arias MD  Department of Internal Medicine - [facility]  11:10 AM

## 2018-12-28 NOTE — TELEPHONE ENCOUNTER
Reason for Disposition   Blood glucose > 400 mg/dl (22 mmol/l)    Answer Assessment - Initial Assessment Questions  Pt type 2 diabetic. Takes pills. cbg within past 30 min is 408. Last ate about noon. Eating ice this pm. Ran home after work to check cbg because he has been voiding a lot today. Reported he has been under a lot of stress, not eating right and not taking care of himself.    Protocols used: ST DIABETES - HIGH BLOOD SUGAR-A-AH

## 2018-12-28 NOTE — PATIENT INSTRUCTIONS
Check glucose daily and write down number on a piece of paper. Follow up with SANDI Martin in 3 weeks.

## 2018-12-28 NOTE — TELEPHONE ENCOUNTER
----- Message from Amanda Gillette sent at 12/28/2018  3:56 PM CST -----  Contact: [facility]  Prescription Clarification:     The pharmacy needs clarity on the following Rx:    metFORMIN (FORTAMET) 500 mg 24 hr tablet    Can we change to generic Metformin ER for Glucophage XR not Fortamet? Fortament has more expensive co-pay.    Pharmacy: [id number] - [place], [place] - [address]    Thank You

## 2018-12-28 NOTE — TELEPHONE ENCOUNTER
Called and spoke to pt and advised that Dr. Arias would like for pt to come in and be seen this Am. Pt stated he will come in around 10:30AM. Pt states his only symptom is frequent urination, otherwise he feels fine.

## 2018-12-31 ENCOUNTER — TELEPHONE (OUTPATIENT)
Dept: INTERNAL MEDICINE | Facility: CLINIC | Age: 38
End: 2018-12-31

## 2018-12-31 NOTE — TELEPHONE ENCOUNTER
Please call and notify pt:    His a1c did drastically increase from 6.4 to 9.9! Goal is for it to be <7. Please see how his sugars are doing on the increased meformin dose.

## 2019-01-14 ENCOUNTER — PATIENT MESSAGE (OUTPATIENT)
Dept: OPTOMETRY | Facility: CLINIC | Age: 39
End: 2019-01-14

## 2019-01-15 ENCOUNTER — PATIENT MESSAGE (OUTPATIENT)
Dept: OPTOMETRY | Facility: CLINIC | Age: 39
End: 2019-01-15

## 2019-02-20 ENCOUNTER — OFFICE VISIT (OUTPATIENT)
Dept: URGENT CARE | Facility: CLINIC | Age: 39
End: 2019-02-20
Payer: COMMERCIAL

## 2019-02-20 VITALS
HEIGHT: 69 IN | SYSTOLIC BLOOD PRESSURE: 135 MMHG | WEIGHT: 315 LBS | BODY MASS INDEX: 46.65 KG/M2 | TEMPERATURE: 98 F | HEART RATE: 90 BPM | RESPIRATION RATE: 19 BRPM | DIASTOLIC BLOOD PRESSURE: 87 MMHG | OXYGEN SATURATION: 97 %

## 2019-02-20 DIAGNOSIS — Z20.2 EXPOSURE TO SEXUALLY TRANSMITTED DISEASE (STD): ICD-10-CM

## 2019-02-20 DIAGNOSIS — L02.211 ABSCESS OF ABDOMINAL WALL: Primary | ICD-10-CM

## 2019-02-20 PROCEDURE — 3075F PR MOST RECENT SYSTOLIC BLOOD PRESS GE 130-139MM HG: ICD-10-PCS | Mod: CPTII,S$GLB,, | Performed by: INTERNAL MEDICINE

## 2019-02-20 PROCEDURE — 10060 I&D ABSCESS SIMPLE/SINGLE: CPT | Mod: S$GLB,,, | Performed by: INTERNAL MEDICINE

## 2019-02-20 PROCEDURE — 3008F PR BODY MASS INDEX (BMI) DOCUMENTED: ICD-10-PCS | Mod: CPTII,S$GLB,, | Performed by: INTERNAL MEDICINE

## 2019-02-20 PROCEDURE — 3079F PR MOST RECENT DIASTOLIC BLOOD PRESSURE 80-89 MM HG: ICD-10-PCS | Mod: CPTII,S$GLB,, | Performed by: INTERNAL MEDICINE

## 2019-02-20 PROCEDURE — 99214 PR OFFICE/OUTPT VISIT, EST, LEVL IV, 30-39 MIN: ICD-10-PCS | Mod: 25,S$GLB,, | Performed by: INTERNAL MEDICINE

## 2019-02-20 PROCEDURE — 3075F SYST BP GE 130 - 139MM HG: CPT | Mod: CPTII,S$GLB,, | Performed by: INTERNAL MEDICINE

## 2019-02-20 PROCEDURE — 3079F DIAST BP 80-89 MM HG: CPT | Mod: CPTII,S$GLB,, | Performed by: INTERNAL MEDICINE

## 2019-02-20 PROCEDURE — 3008F BODY MASS INDEX DOCD: CPT | Mod: CPTII,S$GLB,, | Performed by: INTERNAL MEDICINE

## 2019-02-20 PROCEDURE — 99214 OFFICE O/P EST MOD 30 MIN: CPT | Mod: 25,S$GLB,, | Performed by: INTERNAL MEDICINE

## 2019-02-20 PROCEDURE — 10060 INCISION & DRAINAGE: ICD-10-PCS | Mod: S$GLB,,, | Performed by: INTERNAL MEDICINE

## 2019-02-20 RX ORDER — SULFAMETHOXAZOLE AND TRIMETHOPRIM 800; 160 MG/1; MG/1
1 TABLET ORAL 2 TIMES DAILY
Qty: 20 TABLET | Refills: 0 | Status: SHIPPED | OUTPATIENT
Start: 2019-02-20 | End: 2019-04-03

## 2019-02-20 RX ORDER — MUPIROCIN 20 MG/G
OINTMENT TOPICAL
Qty: 22 G | Refills: 1 | Status: SHIPPED | OUTPATIENT
Start: 2019-02-20 | End: 2019-09-20 | Stop reason: ALTCHOICE

## 2019-02-20 NOTE — PROGRESS NOTES
"Subjective:       Patient ID: Nain Jain is a 38 y.o. male.    Vitals:  height is 5' 9" (1.753 m) and weight is 151.5 kg (334 lb) (abnormal). His oral temperature is 97.7 °F (36.5 °C). His blood pressure is 135/87 and his pulse is 90. His respiration is 19 and oxygen saturation is 97%.     Chief Complaint: Hernia    A bulge protruding from bottom of stomach      GI Problem   Primary symptoms do not include fatigue, myalgias or arthralgias. The illness began more than 7 days ago. The onset was gradual. The problem has been gradually worsening.       Constitution: Negative for fatigue.   HENT: Negative for congestion.    Neck: Negative for painful lymph nodes.   Cardiovascular: Negative for leg swelling.   Eyes: Negative for double vision and blurred vision.   Musculoskeletal: Negative for joint pain, joint swelling, muscle cramps and muscle ache.   Skin: Negative for color change and pale.   Allergic/Immunologic: Negative for seasonal allergies.   Neurological: Negative for dizziness, history of vertigo, light-headedness and passing out.   Hematologic/Lymphatic: Negative for swollen lymph nodes, easy bruising/bleeding and history of blood clots. Does not bruise/bleed easily.   Psychiatric/Behavioral: Negative for nervous/anxious, sleep disturbance and depression. The patient is not nervous/anxious.        Objective:      Physical Exam   Constitutional: He appears well-developed and well-nourished.   Genitourinary:   Genitourinary Comments: Normal exam   Skin:   Abscess lower abd wall   Nursing note and vitals reviewed.    Incision & Drainage  Date/Time: 2/20/2019 1:00 PM  Performed by: Alfredo Ortiz MD  Authorized by: Alfredo Ortiz MD     Time out: Immediately prior to procedure a "time out" was called to verify the correct patient, procedure, equipment, support staff and site/side marked as required.    Consent Done?:  Yes (Verbal)    Type:  Abscess  Body area:  Trunk  Location details:  " Abdomen  Anesthesia:  Local infiltration  Local anesthetic: lidocaine 2% without epinephrine  Anesthetic total (ml):  7  Scalpel size:  11  Incision type:  Single straight  Complexity:  Simple  Drainage:  Pus and bloody  Drainage amount:  Moderate  Wound treatment:  Incision, wound left open and expression of material  Patient tolerance:  Patient tolerated the procedure well with no immediate complications      Assessment:       1. Abscess of abdominal wall    2. Exposure to sexually transmitted disease (STD)        Plan:         Abscess of abdominal wall  -     Incision & Drainage    Exposure to sexually transmitted disease (STD)  -     C. trachomatis/N. gonorrhoeae by AMP DNA  -     Herpes simplex type 1 & 2 IgM,Herpes IgM  -     Herpes Simplex Virus (HSV) Types 1 & 2, IgG, Herpes Titer  -     Hepatitis panel, acute  -     HIV 1/2 Ag/Ab (4th Gen)  -     RPR    Other orders  -     mupirocin (BACTROBAN) 2 % ointment; Apply to affected area 2 times daily  Dispense: 22 g; Refill: 1  -     sulfamethoxazole-trimethoprim 800-160mg (BACTRIM DS) 800-160 mg Tab; Take 1 tablet by mouth 2 (two) times daily.  Dispense: 20 tablet; Refill: 0

## 2019-02-21 LAB
C TRACH RRNA SPEC QL NAA+PROBE: NEGATIVE
N GONORRHOEA RRNA SPEC QL NAA+PROBE: NEGATIVE

## 2019-02-22 ENCOUNTER — PATIENT MESSAGE (OUTPATIENT)
Dept: INTERNAL MEDICINE | Facility: CLINIC | Age: 39
End: 2019-02-22

## 2019-02-22 LAB
HAV IGM SERPL QL IA: NEGATIVE
HBV CORE IGM SERPL QL IA: NEGATIVE
HBV SURFACE AG SERPL QL IA: NEGATIVE
HCV AB S/CO SERPL IA: <0.1 S/CO RATIO (ref 0–0.9)
HIV 1+2 AB+HIV1 P24 AG SERPL QL IA: NON REACTIVE
HSV1 IGG SER IA-ACNC: 42.7 INDEX (ref 0–0.9)
HSV1+2 IGM SER IA-ACNC: <0.91 RATIO (ref 0–0.9)
HSV2 IGG SER IA-ACNC: <0.91 INDEX (ref 0–0.9)
RPR SER QL: NON REACTIVE

## 2019-02-23 ENCOUNTER — PATIENT MESSAGE (OUTPATIENT)
Dept: INTERNAL MEDICINE | Facility: CLINIC | Age: 39
End: 2019-02-23

## 2019-03-08 DIAGNOSIS — E11.9 TYPE 2 DIABETES MELLITUS WITHOUT COMPLICATION: ICD-10-CM

## 2019-04-03 ENCOUNTER — HOSPITAL ENCOUNTER (EMERGENCY)
Facility: HOSPITAL | Age: 39
Discharge: HOME | End: 2019-04-03
Attending: EMERGENCY MEDICINE
Payer: COMMERCIAL

## 2019-04-03 VITALS
BODY MASS INDEX: 46.65 KG/M2 | WEIGHT: 315 LBS | DIASTOLIC BLOOD PRESSURE: 80 MMHG | TEMPERATURE: 98 F | OXYGEN SATURATION: 98 % | HEART RATE: 80 BPM | HEIGHT: 69 IN | SYSTOLIC BLOOD PRESSURE: 138 MMHG | RESPIRATION RATE: 20 BRPM

## 2019-04-03 DIAGNOSIS — L73.2 HIDRADENITIS: ICD-10-CM

## 2019-04-03 DIAGNOSIS — L02.91 ABSCESS: Primary | ICD-10-CM

## 2019-04-03 DIAGNOSIS — Z76.89 ENCOUNTER FOR INCISION AND DRAINAGE PROCEDURE: ICD-10-CM

## 2019-04-03 LAB — POCT GLUCOSE: 131 MG/DL (ref 70–110)

## 2019-04-03 PROCEDURE — 82962 GLUCOSE BLOOD TEST: CPT

## 2019-04-03 PROCEDURE — 25000003 PHARM REV CODE 250: Performed by: NURSE PRACTITIONER

## 2019-04-03 PROCEDURE — 10060 I&D ABSCESS SIMPLE/SINGLE: CPT

## 2019-04-03 PROCEDURE — 99284 EMERGENCY DEPT VISIT MOD MDM: CPT | Mod: 25

## 2019-04-03 RX ORDER — LIDOCAINE HYDROCHLORIDE 10 MG/ML
10 INJECTION INFILTRATION; PERINEURAL ONCE
Status: COMPLETED | OUTPATIENT
Start: 2019-04-03 | End: 2019-04-03

## 2019-04-03 RX ORDER — CLINDAMYCIN HYDROCHLORIDE 150 MG/1
300 CAPSULE ORAL 4 TIMES DAILY
Qty: 56 CAPSULE | Refills: 0 | Status: SHIPPED | OUTPATIENT
Start: 2019-04-03 | End: 2019-04-10

## 2019-04-03 RX ORDER — OXYCODONE AND ACETAMINOPHEN 5; 325 MG/1; MG/1
1 TABLET ORAL
Status: COMPLETED | OUTPATIENT
Start: 2019-04-03 | End: 2019-04-03

## 2019-04-03 RX ORDER — TRAMADOL HYDROCHLORIDE 50 MG/1
50 TABLET ORAL EVERY 6 HOURS PRN
Qty: 10 TABLET | Refills: 0 | Status: SHIPPED | OUTPATIENT
Start: 2019-04-03 | End: 2019-04-18 | Stop reason: CLARIF

## 2019-04-03 RX ORDER — ONDANSETRON 4 MG/1
4 TABLET, ORALLY DISINTEGRATING ORAL
Status: COMPLETED | OUTPATIENT
Start: 2019-04-03 | End: 2019-04-03

## 2019-04-03 RX ADMIN — OXYCODONE HYDROCHLORIDE AND ACETAMINOPHEN 1 TABLET: 5; 325 TABLET ORAL at 08:04

## 2019-04-03 RX ADMIN — ONDANSETRON 4 MG: 4 TABLET, ORALLY DISINTEGRATING ORAL at 08:04

## 2019-04-03 RX ADMIN — LIDOCAINE HYDROCHLORIDE 10 ML: 10 INJECTION, SOLUTION INFILTRATION; PERINEURAL at 08:04

## 2019-04-03 NOTE — ED PROVIDER NOTES
Encounter Date: 4/3/2019       History     Chief Complaint   Patient presents with    Abscess     abscesses  to lower abdomen for one month worse the last two days.     38-year-old male with past medical history of diabetes presents to the ED for an abdominal abscess.  Pt states that he has had the area of induration for a few months, but in the past few days it has started getting larger. He reports history of abscesses in the past in this area which have required I&D.  No fever. He is tolerating oral fluids without difficulty.  No other complaints at this time.     The history is provided by the patient.     Review of patient's allergies indicates:  No Known Allergies  Past Medical History:   Diagnosis Date    BMI 50.0-59.9, adult     Diabetes mellitus     Hyperlipidemia     Hypertension     Morbid obesity     Sleep apnea      Past Surgical History:   Procedure Laterality Date    NO PAST SURGERIES       Family History   Problem Relation Age of Onset    Diabetes Mother     Obesity Mother     Hypertension Mother     Diabetes Maternal Grandmother     COPD Father         smoker    No Known Problems Sister     No Known Problems Brother     No Known Problems Brother     No Known Problems Sister     No Known Problems Sister      Social History     Tobacco Use    Smoking status: Never Smoker    Smokeless tobacco: Never Used   Substance Use Topics    Alcohol use: Yes     Comment: 2 bottles of wine a week - only drinks when he's off.    Drug use: No     Review of Systems   Constitutional: Negative for activity change and fever.   Respiratory: Negative for cough.    Cardiovascular: Negative for chest pain.   Gastrointestinal: Negative for nausea and vomiting.   Musculoskeletal: Negative for back pain.   Skin: Negative for wound.        Abscess     Allergic/Immunologic: Positive for immunocompromised state (DM).   Neurological: Negative for weakness and headaches.   Hematological: Does not bruise/bleed  easily.   Psychiatric/Behavioral: Negative for confusion.   All other systems reviewed and are negative.      Physical Exam     Initial Vitals [04/03/19 0841]   BP Pulse Resp Temp SpO2   129/83 79 18 98.5 °F (36.9 °C) 98 %      MAP       --         Physical Exam    Nursing note and vitals reviewed.  Constitutional: Vital signs are normal. He appears well-developed and well-nourished. He is Obese . He is active and cooperative. He is easily aroused.  Non-toxic appearance. He does not have a sickly appearance. He does not appear ill. No distress.   HENT:   Head: Normocephalic and atraumatic.   Eyes: Conjunctivae are normal.   Neck: Normal range of motion.   Cardiovascular: Normal rate and regular rhythm.   Pulmonary/Chest: Effort normal and breath sounds normal.   Abdominal: Soft. Normal appearance and bowel sounds are normal. There is no tenderness.   Neurological: He is alert, oriented to person, place, and time and easily aroused. GCS eye subscore is 4. GCS verbal subscore is 5. GCS motor subscore is 6.   Skin: Skin is warm, dry and intact. Abscess noted. No rash noted. Erythema: Abdomen.   Hidradenitis to abdominal wall.   Psychiatric: He has a normal mood and affect. His speech is normal and behavior is normal. Judgment and thought content normal. Cognition and memory are normal.                 ED Course   I & D - Incision and Drainage  Date/Time: 4/3/2019 9:40 AM  Location procedure was performed: [facility] EMERGENCY DEPARTMENT  Performed by: ARLET Lira  Authorized by: Richie Colvin MD   Pre-operative diagnosis: abscess  Post-operative diagnosis: abscess  Consent Done: Yes  Consent: Verbal consent obtained. Written consent not obtained.  Risks and benefits: risks, benefits and alternatives were discussed  Consent given by: patient  Patient understanding: patient states understanding of the procedure being performed  Patient consent: the patient's understanding of the procedure matches consent  given  Required items: required blood products, implants, devices, and special equipment available  Patient identity confirmed: verbally with patient  Type: abscess  Body area: trunk  Location details: abdomen  Anesthesia: local infiltration    Anesthesia:  Local Anesthetic: lidocaine 1% without epinephrine  Anesthetic total: 6 mL  Patient sedated: no  Description of findings: moderate amount of pus and serosangunous fluid   Scalpel size: 11  Incision type: single straight  Complexity: simple  Drainage: serosanguinous and  pus  Drainage amount: moderate  Wound treatment: incision,  drainage,  deloculation,  wound packed and  expression of material  Packing material: 1/2 in gauze  Complications: No  Estimated blood loss (mL): 3  Specimens: No  Implants: No  Patient tolerance: Patient tolerated the procedure well with no immediate complications        Labs Reviewed   POCT GLUCOSE MONITORING CONTINUOUS          Imaging Results    None          Medical Decision Making:   Initial Assessment:   38-year-old male here for an abscess on his abdomen.  He has had abscesses in the past that have required I&D.  No fever.  He is a diabetic.  The patient has a large 4 cm area of induration and fluctuance to his abdomen.  No significant overlying erythema.  Differential Diagnosis:   Abscess, hidradenitis  ED Management:  I&D, PO percocet, Zofran ODT  Bedside US confirmed no hernia.  Please see procedure note for details of I&D. I do not suspect sepsis.  Pt has hidradenitis bleeding to localized abscess.  He was referred to general surgeon.  Advised wound recheck in 2 days.  Pt to follow up with PCP within 2 days.  I reviewed strict return precautions. In addition, pt is to return to the ED if condition changes, progresses, or if there are any concerns.  Pt verbalized understanding, compliance, and agreement with the treatment plan.    RX clindamycin and Ultram.                       Clinical Impression:       ICD-10-CM ICD-9-CM    1. Abscess L02.91 682.9   2. Encounter for incision and drainage procedure Z01.89 V72.85   3. Hidradenitis L73.2 705.83                                Hyun Patino, [facility]  04/03/19 0943       Hyun Patino [facility]  04/03/19 0945

## 2019-04-03 NOTE — ED NOTES
38 year old male presents to ed cc of abscess to lower abdomen x 1 month with worsening over past few days. Patient reports hx of abscess to area and has had both drained before. Patient denies fever chills n/v/d. Patient updated on plan of care no questions at this time nurse will continue to monitor call light at bedside

## 2019-04-03 NOTE — ED PROVIDER NOTES
Encounter Date: 4/3/2019       History     Chief Complaint   Patient presents with    Abscess     abscesses  to lower abdomen for one month worse the last two days.     HPI  Review of patient's allergies indicates:  No Known Allergies  Past Medical History:   Diagnosis Date    BMI 50.0-59.9, adult     Diabetes mellitus     Hyperlipidemia     Hypertension     Morbid obesity     Sleep apnea      Past Surgical History:   Procedure Laterality Date    NO PAST SURGERIES       Family History   Problem Relation Age of Onset    Diabetes Mother     Obesity Mother     Hypertension Mother     Diabetes Maternal Grandmother     COPD Father         smoker    No Known Problems Sister     No Known Problems Brother     No Known Problems Brother     No Known Problems Sister     No Known Problems Sister      Social History     Tobacco Use    Smoking status: Never Smoker    Smokeless tobacco: Never Used   Substance Use Topics    Alcohol use: Yes     Comment: 2 bottles of wine a week - only drinks when he's off.    Drug use: No     Review of Systems    Physical Exam     Initial Vitals [04/03/19 0841]   BP Pulse Resp Temp SpO2   129/83 79 18 98.5 °F (36.9 °C) 98 %      MAP       --         Physical Exam    ED Course   Procedures  Labs Reviewed   POCT GLUCOSE - Abnormal; Notable for the following components:       Result Value    POCT Glucose 131 (*)     All other components within normal limits   POCT GLUCOSE MONITORING CONTINUOUS          Imaging Results    None                       Attending Attestation:     Physician Attestation Statement for NP/PA:       Other NP/PA Attestation Additions:    History of Present Illness: Time seen by provider: 9:25 AM    This is a 38 y.o. male who presents with complaint of abscess to his lower abdomen x 1 month. Pt reports Hx of same abscess and having it drained at Urgent Care. He states that the abscess has come back and became much larger within the last 2 days. Pt has not  been seen by PCP for his symptoms.       Procedure Note: 1.5 cm incision was made following bedside US to ensure no bowel was present.           Positive moderate amount of pus expressed following incision and gentle probing with curved hemostats.         Clinical Impression:       ICD-10-CM ICD-9-CM   1. Abscess L02.91 682.9   2. Encounter for incision and drainage procedure Z01.89 V72.85   3. Hidradenitis L73.2 705.83     I have reviewed the notes, assessments, and/or procedures performed by Hyun Patino, I concur with her/his documentation of Nain Jain.    I performed the majority of the incision and drainage procedure.                          Richie Colvin MD  04/04/19 7878

## 2019-04-03 NOTE — ED NOTES
APPEARANCE: Alert, oriented and in no acute distress.  CARDIAC: Normal rate and rhythm, no murmur heard.   PERIPHERAL VASCULAR: peripheral pulses present. Normal cap refill. No edema. Warm to touch.    RESPIRATORY:Normal rate and effort, breath sounds clear bilaterally throughout chest. Respirations are equal and unlabored no obvious signs of distress.  MUSC: Full ROM. No bony tenderness or soft tissue tenderness. No obvious deformity.    NEURO: 5/5 strength major flexors/extensors bilaterally. Sensory intact to light touch bilaterally. Hanover coma scale: eyes open spontaneously-4, oriented & converses-5, obeys commands-6. No neurological abnormalities.   MENTAL STATUS: awake, alert and aware of environment.  EYE: PERRL, both eyes: pupils brisk and reactive to light. Normal size.  ENT: EARS: no obvious drainage. NOSE: no active bleeding.

## 2019-04-05 DIAGNOSIS — E11.9 TYPE 2 DIABETES MELLITUS WITHOUT COMPLICATION: ICD-10-CM

## 2019-04-15 PROBLEM — L73.2 HIDRADENITIS: Status: ACTIVE | Noted: 2019-04-15

## 2019-04-16 ENCOUNTER — PATIENT MESSAGE (OUTPATIENT)
Dept: INTERNAL MEDICINE | Facility: CLINIC | Age: 39
End: 2019-04-16

## 2019-04-17 ENCOUNTER — TELEPHONE (OUTPATIENT)
Dept: INTERNAL MEDICINE | Facility: CLINIC | Age: 39
End: 2019-04-17

## 2019-04-17 ENCOUNTER — PATIENT MESSAGE (OUTPATIENT)
Dept: INTERNAL MEDICINE | Facility: CLINIC | Age: 39
End: 2019-04-17

## 2019-04-17 RX ORDER — VALSARTAN AND HYDROCHLOROTHIAZIDE 320; 25 MG/1; MG/1
1 TABLET, FILM COATED ORAL DAILY
Qty: 90 TABLET | Refills: 3 | Status: SHIPPED | OUTPATIENT
Start: 2019-04-17 | End: 2019-04-18 | Stop reason: SDUPTHER

## 2019-04-17 NOTE — TELEPHONE ENCOUNTER
----- Message from Domingo Bennett sent at 4/17/2019  3:30 PM CDT -----  Contact: [facility]. Kristina [phone]  Pharmacy is calling     RX name:  valsartan-hydrochlorothiazide (DIOVAN-HCT) 320-25 mg per tablet     Comments: It's on back order due to the recent recall but, you can change the miligrams to 320- 12.5 or prescribe a different alternative drug.

## 2019-04-18 ENCOUNTER — PATIENT MESSAGE (OUTPATIENT)
Dept: INTERNAL MEDICINE | Facility: CLINIC | Age: 39
End: 2019-04-18

## 2019-04-18 ENCOUNTER — ANESTHESIA EVENT (OUTPATIENT)
Dept: SURGERY | Facility: HOSPITAL | Age: 39
End: 2019-04-18
Payer: COMMERCIAL

## 2019-04-18 ENCOUNTER — CLINICAL SUPPORT (OUTPATIENT)
Dept: LAB | Facility: HOSPITAL | Age: 39
End: 2019-04-18
Attending: SURGERY
Payer: COMMERCIAL

## 2019-04-18 ENCOUNTER — HOSPITAL ENCOUNTER (OUTPATIENT)
Dept: PREADMISSION TESTING | Facility: HOSPITAL | Age: 39
Discharge: HOME | End: 2019-04-18
Attending: SURGERY
Payer: COMMERCIAL

## 2019-04-18 VITALS
BODY MASS INDEX: 46.65 KG/M2 | DIASTOLIC BLOOD PRESSURE: 83 MMHG | OXYGEN SATURATION: 98 % | WEIGHT: 315 LBS | HEART RATE: 80 BPM | HEIGHT: 69 IN | RESPIRATION RATE: 20 BRPM | SYSTOLIC BLOOD PRESSURE: 142 MMHG | TEMPERATURE: 98 F

## 2019-04-18 DIAGNOSIS — I10 ESSENTIAL HYPERTENSION: ICD-10-CM

## 2019-04-18 DIAGNOSIS — E66.9 DIABETES MELLITUS TYPE 2 IN OBESE: ICD-10-CM

## 2019-04-18 DIAGNOSIS — Z01.818 PREOP EXAMINATION: Primary | ICD-10-CM

## 2019-04-18 DIAGNOSIS — E78.5 HYPERLIPIDEMIA, UNSPECIFIED HYPERLIPIDEMIA TYPE: ICD-10-CM

## 2019-04-18 DIAGNOSIS — E11.69 DIABETES MELLITUS TYPE 2 IN OBESE: ICD-10-CM

## 2019-04-18 DIAGNOSIS — E66.01 MORBID OBESITY WITH BMI OF 50.0-59.9, ADULT: ICD-10-CM

## 2019-04-18 PROCEDURE — 93005 ELECTROCARDIOGRAM TRACING: CPT

## 2019-04-18 RX ORDER — ALPRAZOLAM 0.5 MG/1
0.5 TABLET ORAL 2 TIMES DAILY PRN
Qty: 6 TABLET | Refills: 0 | Status: ON HOLD | OUTPATIENT
Start: 2019-04-18 | End: 2020-08-08 | Stop reason: HOSPADM

## 2019-04-18 RX ORDER — LIDOCAINE HYDROCHLORIDE 10 MG/ML
1 INJECTION, SOLUTION EPIDURAL; INFILTRATION; INTRACAUDAL; PERINEURAL ONCE
Status: CANCELLED | OUTPATIENT
Start: 2019-04-18 | End: 2019-04-18

## 2019-04-18 RX ORDER — VALSARTAN AND HYDROCHLOROTHIAZIDE 320; 25 MG/1; MG/1
1 TABLET, FILM COATED ORAL DAILY
Qty: 90 TABLET | Refills: 3 | Status: SHIPPED | OUTPATIENT
Start: 2019-04-18 | End: 2019-10-14 | Stop reason: RX

## 2019-04-18 RX ORDER — VALSARTAN AND HYDROCHLOROTHIAZIDE 320; 25 MG/1; MG/1
1 TABLET, FILM COATED ORAL DAILY
Qty: 90 TABLET | Refills: 3 | Status: CANCELLED | OUTPATIENT
Start: 2019-04-18 | End: 2020-04-17

## 2019-04-18 NOTE — TELEPHONE ENCOUNTER
Called and spoke to pt.'s wife she has his phone for the day while at work she will give him the message and have him give us a call back.

## 2019-04-18 NOTE — TELEPHONE ENCOUNTER
Called and spoke to pharmacy and they said the alternative is 's discretion but he recommends losartan combo. Will call pt to confirm this is okay.

## 2019-04-18 NOTE — ANESTHESIA PREPROCEDURE EVALUATION
04/18/2019  Nain Jain is a 38 y.o., male scheduled for excision of abdominal cyst on 4/22/19.    Past Medical History:   Diagnosis Date    BMI 50.0-59.9, adult     Diabetes mellitus     Hyperlipidemia     Hypertension     Morbid obesity     Sleep apnea        Anesthesia Evaluation    I have reviewed the Patient Summary Reports.     I have reviewed the Medications.     Review of Systems  Anesthesia Hx:  No problems with previous Anesthesia    Social:  Alcohol Use, Non-Smoker    Hematology/Oncology:  Hematology Normal        Cardiovascular:   Hypertension  Denies Angina.        Pulmonary:   Denies Shortness of breath. Sleep Apnea    Renal/:  Renal/ Normal     Hepatic/GI:  Hepatic/GI Normal  Denies GERD. Denies Liver Disease.    Neurological:  Neurology Normal Denies TIA.  Denies CVA. Denies Seizures.    Endocrine:   Diabetes, poorly controlled, type 2        Physical Exam  General:  Morbid Obesity    Airway/Jaw/Neck:  Airway Findings: Mouth Opening: Normal Tongue: Normal  General Airway Assessment: Adult  Mallampati: II  Improves to I with phonation.  Jaw/Neck Findings:  Neck Findings:  Girth Increased      Dental:  Dental Findings: In tact   Chest/Lungs:  Chest/Lungs Findings: Clear to auscultation, Normal Respiratory Rate     Heart/Vascular:  Heart Findings: Rate: Normal  Rhythm: Regular Rhythm  Sounds: Normal  Heart murmur: negative       Mental Status:  Mental Status Findings:  Cooperative, Alert and Oriented         Anesthesia Plan  Type of Anesthesia, risks & benefits discussed:  Anesthesia Type:  general, MAC  Patient's Preference:   Intra-op Monitoring Plan:   Intra-op Monitoring Plan Comments:   Post Op Pain Control Plan:   Post Op Pain Control Plan Comments:   Induction:   IV  Beta Blocker:         Informed Consent: Patient understands risks and agrees with Anesthesia plan.   Questions answered.   ASA Score: 4     Day of Surgery Review of History & Physical:        Anesthesia Plan Notes: Anesthesia consent to be signed prior to procedure on 4/22/19  Patient extremely anxious about procedure. Anxiety caused him to cancel gastric bypass the day of procedure in past.  Informed patient that he can take Xanax as prescribed by PCP AM of surgery.          Ready For Surgery From Anesthesia Perspective.

## 2019-04-18 NOTE — DISCHARGE INSTRUCTIONS
Your surgery is scheduled for Monday 4/22/19.    Please report to Outpatient Surgery Intake Office on the 2nd FLOOR at 8:30 a.m.          INSTRUCTIONS IMPORTANT!!!  ¨ Do not eat or drink after 12 midnight-including water. OK to brush teeth, no   gum, candy or mints!    ¨ Take only these medicines with a small swallow of water-morning of surgery.    AMLODIPINE, LEXAPRO, DIOVAN HCT    ____  Proceed to [facility] on *** for additional blood test.        ____  Do not wear makeup, including mascara.  ____  No powder, lotions or creams to surgical area.  ____  Please remove all jewelry, including piercings and leave at home.  ____  No money or valuables needed. Please leave at home.  ____  Please bring any documents given by your doctor.  ____  If going home the same day, arrange for a ride home. You will not be able to             drive if Anesthesia was used.  ____  Children under 18 years require a parent / guardian present the entire time             they are in surgery / recovery.  ____  Wear loose fitting clothing. Allow for dressings, bandages.  ____  Stop Aspirin, Ibuprofen, Motrin and Aleve at least 3-5 days before surgery, unless otherwise instructed by your doctor, or the nurse.   You MAY use Tylenol/acetaminophen until day of surgery.  ____  Wash the surgical area with Hibiclens the night before surgery, and again the             morning of surgery.  Be sure to rinse hibiclens off completely (if instructed by   nurse).  ____  If you take diabetic medication, do not take am of surgery unless instructed by Doctor.  ____  Call MD for temperature above 101 degrees or any other signs of infection such as Urinary (bladder) infection, Upper respiratory infection, skin boils, etc.  ____ Stop taking any Fish Oil supplement or any Vitamins that contain Vitamin E at least 5 days prior to surgery.  ____ Do Not wear your contact lenses the day of your procedure.  You may wear your glasses.      ____Do not  shave surgical site for 3 days prior to surgery.  ____ Practice Good hand washing before, during, and after procedure.      I have read or had read and explained to me, and understand the above information.  Additional comments or instructions:  For additional questions call [phone]      ANESTHESIA SIDE EFFECTS  -For the first 24 hours after surgery:  Do not drive, use heavy equipment, make important decisions, or drink alcohol  -It is normal to feel sleepy for several hours.  Rest until you are more awake.  -Have someone stay with you, if needed.  They can watch for problems and help keep you safe.  -Some possible post anesthesia side effects include: nausea and vomiting, sore throat and hoarseness, sleepiness, and dizziness.        Pre-Op Bathing Instructions    Before surgery, you can play an important role in your own health.    Because skin is not sterile, we need to be sure that your skin is as free of germs as possible. By following the instructions below, you can reduce the number of germs on your skin before surgery.    IMPORTANT: You will need to shower with a special soap called Hibiclens*, available at any pharmacy.  If you are allergic to Chlorhexidine (the antiseptic in Hibiclens), use an antibacterial soap such as Dial Soap for your preoperative shower.  You will shower with Hibiclens both the night before your surgery and the morning of your surgery.  Do not use Hibiclens on the head, face or genitals to avoid injury to those areas.    STEP #1: THE NIGHT BEFORE YOUR SURGERY     1. Do not shave the area of your body where your surgery will be performed.  2. Shower and wash your hair and body as usual with your normal soap and shampoo.  3. Rinse your hair and body thoroughly after you shower to remove all soap residue.  4. With your hand, apply one packet of Hibiclens soap to the surgical site.   5. Wash the site gently for five (5) minutes. Do not scrub your skin too hard.   6. Do not wash with your  regular soap after Hibiclens is used.  7. Rinse your body thoroughly.  8. Pat yourself dry with a clean, soft towel.  9. Do not use lotion, cream, or powder.  10. Wear clean clothes.    STEP #2: THE MORNING OF YOUR SURGERY     1. Repeat Step #1.    * Not to be used by people allergic to Chlorhexidine.      Anesthesia: General Anesthesia     You are watched continuously during your procedure by your anesthesia provider.     Youre due to have surgery. During surgery, youll be given medicine called anesthesia or anesthetic. This will keep you comfortable and pain-free. Your anesthesia provider will use general anesthesia.  What is general anesthesia?  General anesthesia puts you into a state like deep sleep. It goes into the bloodstream (IV anesthetics), into the lungs (gas anesthetics), or both. You feel nothing during the procedure. You will not remember it. During the procedure, the anesthesia provider monitors you continuously. He or she checks your heart rate and rhythm, blood pressure, breathing, and blood oxygen.  · IV anesthetics. IV anesthetics are given through an IV line in your arm. Theyre often given first. This is so you are asleep before a gas anesthetic is started. Some kinds of IV anesthetics relieve pain. Others relax you. Your doctor will decide which kind is best in your case.  · Gas anesthetics. Gas anesthetics are breathed into the lungs. They are often used to keep you asleep. They can be given through a facemask or a tube placed in your larynx or trachea (breathing tube).  ¨ If you have a facemask, your anesthesia provider will most likely place it over your nose and mouth while youre still awake. Youll breathe oxygen through the mask as your IV anesthetic is started. Gas anesthetic may be added through the mask.  ¨ If you have a tube in the larynx or trachea, it will be inserted into your throat after youre asleep.  Anesthesia tools and medicines  You will likely have:  · IV anesthetics.  These are put into an IV line into your bloodstream.  · Gas anesthetics. You breathe these anesthetics into your lungs, where they pass into your bloodstream.  · Pulse oximeter. This is a small clip that is attached to the end of your finger. This measures your blood oxygen level.  · Electrocardiography leads (electrodes). These are small sticky pads that are placed on your chest. They record your heart rate and rhythm.  · Blood pressure cuff. This reads your blood pressure.  Risks and possible complications  General anesthesia has some risks. These include:  · Breathing problems  · Nausea and vomiting  · Sore throat or hoarseness (usually temporary)  · Allergic reaction to the anesthetic  · Irregular heartbeat (rare)  · Cardiac arrest (rare)   Anesthesia safety  · Follow all instructions you are given for how long not to eat or drink before your procedure.  · Be sure your doctor knows what medicines and drugs you take. This includes over-the-counter medicines, herbs, supplements, alcohol or other drugs. You will be asked when those were last taken.  · Have an adult family member or friend drive you home after the procedure.  · For the first 24 hours after your surgery:  ¨ Do not drive or use heavy equipment.  ¨ Do not make important decisions or sign legal documents. If important decisions or signing legal documents is necessary during the first 24 hours after surgery, have a trusted family member or spouse act on your behalf.  ¨ Avoid alcohol.  ¨ Have a responsible adult stay with you. He or she can watch for problems and help keep you safe.  Date Last Reviewed: 12/1/2016  © 0761-4613 [employer]. [address] [place] [address], [place] [address]. All rights reserved. This information is not intended as a substitute for professional medical care. Always follow your healthcare professional's instructions.

## 2019-04-22 ENCOUNTER — HOSPITAL ENCOUNTER (OUTPATIENT)
Facility: HOSPITAL | Age: 39
Discharge: HOME | End: 2019-04-22
Attending: SURGERY | Admitting: SURGERY
Payer: COMMERCIAL

## 2019-04-22 ENCOUNTER — ANESTHESIA (OUTPATIENT)
Dept: SURGERY | Facility: HOSPITAL | Age: 39
End: 2019-04-22
Payer: COMMERCIAL

## 2019-04-22 VITALS
OXYGEN SATURATION: 97 % | HEIGHT: 69 IN | SYSTOLIC BLOOD PRESSURE: 145 MMHG | TEMPERATURE: 98 F | RESPIRATION RATE: 18 BRPM | HEART RATE: 78 BPM | DIASTOLIC BLOOD PRESSURE: 91 MMHG | WEIGHT: 315 LBS | BODY MASS INDEX: 46.65 KG/M2

## 2019-04-22 DIAGNOSIS — D64.9 NORMOCYTIC ANEMIA: Primary | ICD-10-CM

## 2019-04-22 DIAGNOSIS — E11.9 TYPE 2 DIABETES MELLITUS WITHOUT COMPLICATION, WITHOUT LONG-TERM CURRENT USE OF INSULIN: ICD-10-CM

## 2019-04-22 DIAGNOSIS — I10 ESSENTIAL HYPERTENSION: ICD-10-CM

## 2019-04-22 DIAGNOSIS — E78.5 HYPERLIPIDEMIA, UNSPECIFIED HYPERLIPIDEMIA TYPE: ICD-10-CM

## 2019-04-22 DIAGNOSIS — E66.01 MORBID OBESITY WITH BMI OF 50.0-59.9, ADULT: ICD-10-CM

## 2019-04-22 DIAGNOSIS — L73.2 HIDRADENITIS: ICD-10-CM

## 2019-04-22 LAB — POCT GLUCOSE: 152 MG/DL (ref 70–110)

## 2019-04-22 PROCEDURE — 71000015 HC POSTOP RECOV 1ST HR: Performed by: SURGERY

## 2019-04-22 PROCEDURE — 88307 TISSUE EXAM BY PATHOLOGIST: CPT | Mod: 26,,, | Performed by: PATHOLOGY

## 2019-04-22 PROCEDURE — 25000003 PHARM REV CODE 250: Performed by: ANESTHESIOLOGY

## 2019-04-22 PROCEDURE — 88307 TISSUE SPECIMEN TO PATHOLOGY - SURGERY: ICD-10-PCS | Mod: 26,,, | Performed by: PATHOLOGY

## 2019-04-22 PROCEDURE — 63600175 PHARM REV CODE 636 W HCPCS: Performed by: SURGERY

## 2019-04-22 PROCEDURE — 88307 TISSUE EXAM BY PATHOLOGIST: CPT | Performed by: PATHOLOGY

## 2019-04-22 PROCEDURE — 63600175 PHARM REV CODE 636 W HCPCS: Performed by: NURSE ANESTHETIST, CERTIFIED REGISTERED

## 2019-04-22 PROCEDURE — 36000706: Performed by: SURGERY

## 2019-04-22 PROCEDURE — 37000009 HC ANESTHESIA EA ADD 15 MINS: Performed by: SURGERY

## 2019-04-22 PROCEDURE — 36000707: Performed by: SURGERY

## 2019-04-22 PROCEDURE — 25000003 PHARM REV CODE 250: Performed by: SURGERY

## 2019-04-22 PROCEDURE — S0020 INJECTION, BUPIVICAINE HYDRO: HCPCS | Performed by: SURGERY

## 2019-04-22 PROCEDURE — 71000016 HC POSTOP RECOV ADDL HR: Performed by: SURGERY

## 2019-04-22 PROCEDURE — 37000008 HC ANESTHESIA 1ST 15 MINUTES: Performed by: SURGERY

## 2019-04-22 RX ORDER — HYDROCODONE BITARTRATE AND ACETAMINOPHEN 10; 325 MG/1; MG/1
1 TABLET ORAL EVERY 4 HOURS PRN
Status: DISCONTINUED | OUTPATIENT
Start: 2019-04-22 | End: 2019-04-22 | Stop reason: HOSPADM

## 2019-04-22 RX ORDER — ONDANSETRON 2 MG/ML
4 INJECTION INTRAMUSCULAR; INTRAVENOUS DAILY PRN
Status: DISCONTINUED | OUTPATIENT
Start: 2019-04-22 | End: 2019-04-22 | Stop reason: HOSPADM

## 2019-04-22 RX ORDER — LIDOCAINE HYDROCHLORIDE 10 MG/ML
1 INJECTION, SOLUTION EPIDURAL; INFILTRATION; INTRACAUDAL; PERINEURAL ONCE
Status: DISCONTINUED | OUTPATIENT
Start: 2019-04-22 | End: 2019-04-22 | Stop reason: HOSPADM

## 2019-04-22 RX ORDER — MEPERIDINE HYDROCHLORIDE 50 MG/ML
12.5 INJECTION INTRAMUSCULAR; INTRAVENOUS; SUBCUTANEOUS ONCE AS NEEDED
Status: DISCONTINUED | OUTPATIENT
Start: 2019-04-22 | End: 2019-04-22 | Stop reason: HOSPADM

## 2019-04-22 RX ORDER — LIDOCAINE HCL/PF 100 MG/5ML
SYRINGE (ML) INTRAVENOUS
Status: DISCONTINUED | OUTPATIENT
Start: 2019-04-22 | End: 2019-04-22

## 2019-04-22 RX ORDER — SODIUM CHLORIDE 9 MG/ML
INJECTION, SOLUTION INTRAVENOUS CONTINUOUS
Status: DISCONTINUED | OUTPATIENT
Start: 2019-04-22 | End: 2019-04-22 | Stop reason: HOSPADM

## 2019-04-22 RX ORDER — SODIUM CHLORIDE 0.9 % (FLUSH) 0.9 %
3 SYRINGE (ML) INJECTION
Status: DISCONTINUED | OUTPATIENT
Start: 2019-04-22 | End: 2019-04-22 | Stop reason: HOSPADM

## 2019-04-22 RX ORDER — HYDROCODONE BITARTRATE AND ACETAMINOPHEN 5; 325 MG/1; MG/1
1 TABLET ORAL EVERY 4 HOURS PRN
Status: DISCONTINUED | OUTPATIENT
Start: 2019-04-22 | End: 2019-04-22 | Stop reason: HOSPADM

## 2019-04-22 RX ORDER — BUPIVACAINE HYDROCHLORIDE 5 MG/ML
INJECTION, SOLUTION EPIDURAL; INTRACAUDAL
Status: DISCONTINUED | OUTPATIENT
Start: 2019-04-22 | End: 2019-04-22 | Stop reason: HOSPADM

## 2019-04-22 RX ORDER — MIDAZOLAM HYDROCHLORIDE 1 MG/ML
INJECTION, SOLUTION INTRAMUSCULAR; INTRAVENOUS
Status: DISCONTINUED | OUTPATIENT
Start: 2019-04-22 | End: 2019-04-22

## 2019-04-22 RX ORDER — CEPHALEXIN 500 MG/1
500 CAPSULE ORAL EVERY 12 HOURS
Qty: 10 CAPSULE | Refills: 0 | Status: SHIPPED | OUTPATIENT
Start: 2019-04-22 | End: 2019-05-02

## 2019-04-22 RX ORDER — FENTANYL CITRATE 50 UG/ML
INJECTION, SOLUTION INTRAMUSCULAR; INTRAVENOUS
Status: DISCONTINUED | OUTPATIENT
Start: 2019-04-22 | End: 2019-04-22

## 2019-04-22 RX ORDER — PROPOFOL 10 MG/ML
VIAL (ML) INTRAVENOUS
Status: DISCONTINUED | OUTPATIENT
Start: 2019-04-22 | End: 2019-04-22

## 2019-04-22 RX ORDER — HYDROCODONE BITARTRATE AND ACETAMINOPHEN 5; 325 MG/1; MG/1
1 TABLET ORAL EVERY 6 HOURS PRN
Qty: 24 TABLET | Refills: 0 | Status: SHIPPED | OUTPATIENT
Start: 2019-04-22 | End: 2019-05-23

## 2019-04-22 RX ORDER — ACETAMINOPHEN 325 MG/1
650 TABLET ORAL EVERY 4 HOURS PRN
Status: DISCONTINUED | OUTPATIENT
Start: 2019-04-22 | End: 2019-04-22 | Stop reason: HOSPADM

## 2019-04-22 RX ORDER — SODIUM CHLORIDE, SODIUM LACTATE, POTASSIUM CHLORIDE, CALCIUM CHLORIDE 600; 310; 30; 20 MG/100ML; MG/100ML; MG/100ML; MG/100ML
INJECTION, SOLUTION INTRAVENOUS CONTINUOUS
Status: DISCONTINUED | OUTPATIENT
Start: 2019-04-22 | End: 2019-04-22 | Stop reason: HOSPADM

## 2019-04-22 RX ORDER — LIDOCAINE HYDROCHLORIDE 10 MG/ML
INJECTION, SOLUTION EPIDURAL; INFILTRATION; INTRACAUDAL; PERINEURAL
Status: DISCONTINUED | OUTPATIENT
Start: 2019-04-22 | End: 2019-04-22 | Stop reason: HOSPADM

## 2019-04-22 RX ORDER — HYDROMORPHONE HYDROCHLORIDE 2 MG/ML
0.5 INJECTION, SOLUTION INTRAMUSCULAR; INTRAVENOUS; SUBCUTANEOUS EVERY 5 MIN PRN
Status: DISCONTINUED | OUTPATIENT
Start: 2019-04-22 | End: 2019-04-22 | Stop reason: HOSPADM

## 2019-04-22 RX ADMIN — PROPOFOL 20 MG: 10 INJECTION, EMULSION INTRAVENOUS at 09:04

## 2019-04-22 RX ADMIN — SODIUM CHLORIDE, SODIUM LACTATE, POTASSIUM CHLORIDE, AND CALCIUM CHLORIDE: .6; .31; .03; .02 INJECTION, SOLUTION INTRAVENOUS at 08:04

## 2019-04-22 RX ADMIN — FENTANYL CITRATE 25 MCG: 50 INJECTION, SOLUTION INTRAMUSCULAR; INTRAVENOUS at 09:04

## 2019-04-22 RX ADMIN — PROPOFOL 50 MG: 10 INJECTION, EMULSION INTRAVENOUS at 09:04

## 2019-04-22 RX ADMIN — MIDAZOLAM 2 MG: 1 INJECTION INTRAMUSCULAR; INTRAVENOUS at 09:04

## 2019-04-22 RX ADMIN — PROPOFOL 20 MG: 10 INJECTION, EMULSION INTRAVENOUS at 10:04

## 2019-04-22 RX ADMIN — LIDOCAINE HYDROCHLORIDE 40 MG: 20 INJECTION, SOLUTION INTRAVENOUS at 09:04

## 2019-04-22 RX ADMIN — PROPOFOL 30 MG: 10 INJECTION, EMULSION INTRAVENOUS at 10:04

## 2019-04-22 RX ADMIN — DEXTROSE 2 G: 50 INJECTION, SOLUTION INTRAVENOUS at 09:04

## 2019-04-22 RX ADMIN — HYDROCODONE BITARTRATE AND ACETAMINOPHEN 1 TABLET: 10; 325 TABLET ORAL at 11:04

## 2019-04-22 NOTE — H&P
istory of Present Illness:  Patient is a 38 y.o. male presents with            Chief Complaint   Patient presents with    Abscess       pt c/o recurrent abscesses (2) to lower abdomen with minimal clear drainage x one month (pt states he went to Urgent Care 2 mos ago for I&D)         Review of patient's allergies indicates:  No Known Allergies     Current Medications          Current Outpatient Medications   Medication Sig Dispense Refill    acyclovir (ZOVIRAX) 400 MG tablet Take 1 tablet (400 mg total) by mouth 2 (two) times daily. 60 tablet 11    albuterol (PROVENTIL/VENTOLIN HFA) 90 mcg/actuation inhaler Inhale 2 puffs into the lungs every 6 (six) hours as needed for Wheezing or Shortness of Breath. Rescue 1 Inhaler 0    albuterol 90 mcg/actuation inhaler Inhale 1-2 puffs into the lungs every 6 (six) hours as needed for Wheezing. 1 Inhaler 0    ALPRAZolam (XANAX) 0.5 MG tablet Take 1 tablet (0.5 mg total) by mouth 3 (three) times daily as needed for Anxiety. 30 tablet 0    amLODIPine (NORVASC) 10 MG tablet Take 1 tablet (10 mg total) by mouth every morning. 90 tablet 3    diazePAM (VALIUM) 5 MG tablet          escitalopram oxalate (LEXAPRO) 20 MG tablet Take 1 tablet (20 mg total) by mouth once daily. 30 tablet 11    fluticasone (FLONASE) 50 mcg/actuation nasal spray 2 sprays (100 mcg total) by Each Nare route once daily. 1 Bottle 0    fluticasone (FLOVENT DISKUS) 100 mcg/actuation inhaler Inhale 2 puffs (200 mcg total) into the lungs 2 (two) times daily. Controller 2 Inhaler 11    metFORMIN (FORTAMET) 500 mg 24 hr tablet Take 2 tablets (1,000 mg total) by mouth 2 (two) times daily with meals. 360 tablet 3    mupirocin (BACTROBAN) 2 % ointment Apply to affected area 2 times daily 22 g 1    olmesartan-hydrochlorothiazide (BENICAR HCT) 40-25 mg per tablet Take 1 tablet by mouth once daily. 90 tablet 3    omeprazole (PRILOSEC) 20 MG capsule Take 1 capsule (20 mg total) by mouth once daily. 30 capsule 0  "   rosuvastatin (CRESTOR) 40 MG Tab Take 1 tablet (40 mg total) by mouth every evening. 90 tablet 3    traMADol (ULTRAM) 50 mg tablet Take 1 tablet (50 mg total) by mouth every 6 (six) hours as needed for Pain (No driving. Do not use at work. May be sedating. Do not use with anxiety or other pain medication.). 10 tablet 0      No current facility-administered medications for this visit.                  Past Medical History:   Diagnosis Date    BMI 50.0-59.9, adult      Diabetes mellitus      Hyperlipidemia      Hypertension      Morbid obesity      Sleep apnea              Past Surgical History:   Procedure Laterality Date    NO PAST SURGERIES                Family History   Problem Relation Age of Onset    Diabetes Mother      Obesity Mother      Hypertension Mother      Diabetes Maternal Grandmother      COPD Father           smoker    No Known Problems Sister      No Known Problems Brother      No Known Problems Brother      No Known Problems Sister      No Known Problems Sister        Social History      Tobacco Use    Smoking status: Never Smoker    Smokeless tobacco: Never Used   Substance Use Topics    Alcohol use: Yes       Comment: 2 bottles of wine a week - only drinks when he's off.    Drug use: No         Review of Systems:     Review of Systems   Constitutional: Negative.  Negative for diaphoresis and fever.   HENT: Negative.    Eyes: Negative.    Respiratory: Negative.    Cardiovascular: Negative.    Gastrointestinal: Positive for abdominal pain.   Genitourinary: Negative.    Musculoskeletal: Negative.    Neurological: Negative.    Psychiatric/Behavioral: Negative.          OBJECTIVE:      Vital Signs (Most Recent)  Pulse: 86 (04/15/19 1409)  Resp: 17 (04/15/19 1409)  BP: (!) 144/95 (04/15/19 1409)  5' 9" (1.753 m)  (!) 155.6 kg (343 lb)      Physical Exam:     Physical Exam   Constitutional: He is oriented to person, place, and time. He appears well-developed and " well-nourished.   HENT:   Head: Normocephalic.   Eyes: Pupils are equal, round, and reactive to light. Conjunctivae and EOM are normal.   Neck: Normal range of motion. Neck supple.   Cardiovascular: Normal rate, regular rhythm, normal heart sounds and intact distal pulses.   Pulmonary/Chest: Effort normal and breath sounds normal.   Abdominal: Soft. Bowel sounds are normal.       Musculoskeletal: Normal range of motion.   Neurological: He is alert and oriented to person, place, and time. He has normal reflexes.   Skin: Skin is warm and dry.         Laboratory        Diagnostic Results:        ASSESSMENT/PLAN:      DM  HTN  Infected cyst abdominal wall x 2     PLAN:Plan   Excion of cyst as out patient today

## 2019-04-22 NOTE — BRIEF OP NOTE
Operative Note       Surgery Date: 4/22/2019     Surgeon(s) and Role:     * Pj Saucedo MD - Primary    Pre-op Diagnosis:  Essential hypertension [I10]  Type 2 diabetes mellitus without complication, without long-term current use of insulin [E11.9]  Morbid obesity with BMI of 50.0-59.9, adult [E66.01, Z68.43]  Hidradenitis [L73.2]    Post-op Diagnosis: Post-Op Diagnosis Codes:     * Essential hypertension [I10]     * Type 2 diabetes mellitus without complication, without long-term current use of insulin [E11.9]     * Morbid obesity with BMI of 50.0-59.9, adult [E66.01, Z68.43]     * Hidradenitis [L73.2]    Procedure(s) (LRB):  EXCISION, MASS, ABDOMEN (N/A)  #1 5 x 4 cm  # 2  2 x 3 cm  #3  8X7 cm  Anesthesia: MAC    Procedure in Detail/Findings:  Excision abdominal wall masses done under mac anaesthesia , Patient tolerated well and was sent to recovery room in stable condition. No intra op complications, intra op findings same    Estimated Blood Loss:30 cc        Specimens (From admission, onward)    None        Implants: * No implants in log *           Disposition: PACU - hemodynamically stable.           Condition: Good    Attestation:  I was present and scrubbed for the entire procedure.           Discharge Note    Admit Date: 4/22/2019    Attending Physician: Pj Saucedo MD     Discharge Physician: Pj Saucedo MD    Final Diagnosis: Post-Op Diagnosis Codes:     * Essential hypertension [I10]     * Type 2 diabetes mellitus without complication, without long-term current use of insulin [E11.9]     * Morbid obesity with BMI of 50.0-59.9, adult [E66.01, Z68.43]     * Hidradenitis [L73.2]    Disposition: Home or Self Care    Patient Instructions:   Current Discharge Medication List      START taking these medications    Details   cephALEXin (KEFLEX) 500 MG capsule Take 1 capsule (500 mg total) by mouth every 12 (twelve) hours. for 10 days  Qty: 10 capsule, Refills: 0       HYDROcodone-acetaminophen (NORCO) 5-325 mg per tablet Take 1 tablet by mouth every 6 (six) hours as needed for Pain.  Qty: 24 tablet, Refills: 0         CONTINUE these medications which have NOT CHANGED    Details   ALPRAZolam (XANAX) 0.5 MG tablet Take 1 tablet (0.5 mg total) by mouth 2 (two) times daily as needed for Anxiety.  Qty: 6 tablet, Refills: 0      escitalopram oxalate (LEXAPRO) 20 MG tablet Take 1 tablet (20 mg total) by mouth once daily.  Qty: 30 tablet, Refills: 11    Associated Diagnoses: Adjustment disorder with depressed mood; Anxiety      metFORMIN (FORTAMET) 500 mg 24 hr tablet Take 2 tablets (1,000 mg total) by mouth 2 (two) times daily with meals.  Qty: 360 tablet, Refills: 3    Associated Diagnoses: Diabetes mellitus type 2 in obese      rosuvastatin (CRESTOR) 40 MG Tab Take 1 tablet (40 mg total) by mouth every evening.  Qty: 90 tablet, Refills: 3    Associated Diagnoses: Hyperlipidemia associated with type 2 diabetes mellitus      valsartan-hydrochlorothiazide (DIOVAN-HCT) 320-25 mg per tablet Take 1 tablet by mouth once daily.  Qty: 90 tablet, Refills: 3      acyclovir (ZOVIRAX) 400 MG tablet Take 1 tablet (400 mg total) by mouth 2 (two) times daily.  Qty: 60 tablet, Refills: 11    Associated Diagnoses: Herpes keratitis      !! albuterol (PROVENTIL/VENTOLIN HFA) 90 mcg/actuation inhaler Inhale 2 puffs into the lungs every 6 (six) hours as needed for Wheezing or Shortness of Breath. Rescue  Qty: 1 Inhaler, Refills: 0    Associated Diagnoses: Shortness of breath      !! albuterol 90 mcg/actuation inhaler Inhale 1-2 puffs into the lungs every 6 (six) hours as needed for Wheezing.  Qty: 1 Inhaler, Refills: 0      amLODIPine (NORVASC) 10 MG tablet Take 1 tablet (10 mg total) by mouth every morning.  Qty: 90 tablet, Refills: 3    Associated Diagnoses: Hypertension associated with diabetes      mupirocin (BACTROBAN) 2 % ointment Apply to affected area 2 times daily  Qty: 22 g, Refills: 1       !!  - Potential duplicate medications found. Please discuss with provider.          Keep dressing dry , no heavy lifting , rtc office one week   Discharge Procedure Orders (must include Diet, Follow-up, Activity)   Diet general     Keep surgical extremity elevated     Lifting restrictions     Call MD for:  temperature >100.4     Call MD for:  persistent nausea and vomiting     Call MD for:  severe uncontrolled pain     Call MD for:  redness, tenderness, or signs of infection (pain, swelling, redness, odor or green/yellow discharge around incision site)     Leave dressing on - Keep it clean, dry, and intact until clinic visit        Discharge Date: 4/22/2019

## 2019-04-22 NOTE — DISCHARGE INSTRUCTIONS
DRESSING CARE AND ACTIVITY  -KEEP DRESSING CLEAN, DRY AND INTACT UNTIL FOLLOW UP IN 1 WEEK, IF DRESSING BEGINS TO FALL OFF OR BECOMES WET OR SOILED BEFORE THAN YOU MAY REPLACE WITH DRESSINGS OVER THE COUNTER  -NO HEAVY LIFTING OR STRENUOUS ACTIVITY UNTIL CLEARED BY DR YEN  -TAKE PAIN MEDS AS NEEDED AND ANTIBIOTICS AS DIRECTED  ANESTHESIA  -For the first 24 hours after surgery:  Do not drive, use heavy equipment, make important decisions, or drink alcohol  -It is normal to feel sleepy for several hours.  Rest until you are more awake.  -Have someone stay with you, if needed.  They can watch for problems and help keep you safe.  -Some possible post anesthesia side effects include: nausea and vomiting, sore throat and hoarseness, sleepiness, and dizziness.    PAIN  -If you have pain after surgery, pain medicine will help you feel better.  Take it as directed, before pain becomes severe.  Most pain relievers taken by mouth need at least 20-30 minutes to start working.  -Do not drive or drink alcohol while taking pain medicine.  -Pain medication can upset your stomach.  Taking them with a little food may help.  -Other ways to help control pain: elevation, ice, and relaxation  -Call your surgeon if still having unmanageable pain an hour after taking pain medicine.  -Pain medicine can cause constipation.  Taking an over-the counter stool softener while on prescription pain medicine and drinking plenty of fluids can prevent this side effect.  -Call your surgeon if you have severe side effects like: breathing problems, trouble waking up, dizziness, confusion, or severe constipation.    NAUSEA  -Some people have nausea after surgery.  This is often because of anesthesia, pain, pain medicine, or the stress of surgery.  -Do not push yourself to eat.  Start off with clear liquids and soup.  Slowly move to solid foods.  Don't eat fatty, rich, spicy foods at first.  Eat smaller amounts.  -If you develop persistent nausea and  vomiting please notify your surgeon immediately.    BLEEDING  -Different types of surgery require different types of care and dressing changes.  It is important to follow all instructions and advice from your surgeon.  Change dressing as directed.  Call your surgeon for any concerns regarding postop bleeding.    SIGNS OF INFECTION  -Signs of infection include: fever, swelling, drainage, and redness  -Notify your surgeon if you have a fever of 100.4 F (38.0 C) or higher.  -Notify your surgeon if you notice redness, swelling, increased pain, pus, or a foul smell at the incision site.    Acetaminophen; Hydrocodone tablets or capsules  What is this medicine?  ACETAMINOPHEN; HYDROCODONE (a set a BRONWYN cass fen; dimple droe KOE done) is a pain reliever. It is used to treat moderate to severe pain.  How should I use this medicine?  Take this medicine by mouth with a glass of water. Follow the directions on the prescription label. You can take it with or without food. If it upsets your stomach, take it with food. Do not take your medicine more often than directed.  A special MedGuide will be given to you by the pharmacist with each prescription and refill. Be sure to read this information carefully each time.  Talk to your pediatrician regarding the use of this medicine in children. Special care may be needed.  What side effects may I notice from receiving this medicine?  Side effects that you should report to your doctor or health care professional as soon as possible:  · allergic reactions like skin rash, itching or hives, swelling of the face, lips, or tongue  · breathing problems  · confusion  · redness, blistering, peeling or loosening of the skin, including inside the mouth  · signs and symptoms of low blood pressure like dizziness; feeling faint or lightheaded, falls; unusually weak or tired  · trouble passing urine or change in the amount of urine  · yellowing of the eyes or skin  Side effects that usually do not require  medical attention (report to your doctor or health care professional if they continue or are bothersome):  · constipation  · dry mouth  · nausea, vomiting  · tiredness  What may interact with this medicine?  This medicine may interact with the following medications:  · alcohol  · antiviral medicines for HIV or AIDS  · atropine  · antihistamines for allergy, cough and cold  · certain antibiotics like erythromycin, clarithromycin  · certain medicines for anxiety or sleep  · certain medicines for bladder problems like oxybutynin, tolterodine  · certain medicines for depression like amitriptyline, fluoxetine, sertraline  · certain medicines for fungal infections like ketoconazole and itraconazole  · certain medicines for Parkinson's disease like benztropine, trihexyphenidyl  · certain medicines for seizures like carbamazepine, phenobarbital, phenytoin, primidone  · certain medicines for stomach problems like dicyclomine, hyoscyamine  · certain medicines for travel sickness like scopolamine  · general anesthetics like halothane, isoflurane, methoxyflurane, propofol  · ipratropium  · local anesthetics like lidocaine, pramoxine, tetracaine  · MAOIs like Carbex, Eldepryl, Marplan, Nardil, and Parnate  · medicines that relax muscles for surgery  · other medicines with acetaminophen  · other narcotic medicines for pain or cough  · phenothiazines like chlorpromazine, mesoridazine, prochlorperazine, thioridazine  · rifampin  What if I miss a dose?  If you miss a dose, take it as soon as you can. If it is almost time for your next dose, take only that dose. Do not take double or extra doses.  Where should I keep my medicine?  Keep out of the reach of children. This medicine can be abused. Keep your medicine in a safe place to protect it from theft. Do not share this medicine with anyone. Selling or giving away this medicine is dangerous and against the law.  This medicine may cause accidental overdose and death if it taken by  other adults, children, or pets. Mix any unused medicine with a substance like cat litter or coffee grounds. Then throw the medicine away in a sealed container like a sealed bag or a coffee can with a lid. Do not use the medicine after the expiration date.  Store at room temperature between 15 and 30 degrees C (59 and 86 degrees F).  What should I tell my health care provider before I take this medicine?  They need to know if you have any of these conditions:  · brain tumor  · Crohn's disease, inflammatory bowel disease, or ulcerative colitis  · drug abuse or addiction  · head injury  · heart or circulation problems  · if you often drink alcohol  · kidney disease or problems going to the bathroom  · liver disease  · lung disease, asthma, or breathing problems  · an unusual or allergic reaction to acetaminophen, hydrocodone, other opioid analgesics, other medicines, foods, dyes, or preservatives  · pregnant or trying to get pregnant  · breast-feeding  What should I watch for while using this medicine?  Tell your doctor or health care professional if your pain does not go away, if it gets worse, or if you have new or a different type of pain. You may develop tolerance to the medicine. Tolerance means that you will need a higher dose of the medicine for pain relief. Tolerance is normal and is expected if you take the medicine for a long time.  Do not suddenly stop taking your medicine because you may develop a severe reaction. Your body becomes used to the medicine. This does NOT mean you are addicted. Addiction is a behavior related to getting and using a drug for a non-medical reason. If you have pain, you have a medical reason to take pain medicine. Your doctor will tell you how much medicine to take. If your doctor wants you to stop the medicine, the dose will be slowly lowered over time to avoid any side effects.  There are different types of narcotic medicines (opiates). If you take more than one type at the same  time or if you are taking another medicine that also causes drowsiness, you may have more side effects. Give your health care provider a list of all medicines you use. Your doctor will tell you how much medicine to take. Do not take more medicine than directed. Call emergency for help if you have problems breathing or unusual sleepiness.  Do not take other medicines that contain acetaminophen with this medicine. Always read labels carefully. If you have questions, ask your doctor or pharmacist.  If you take too much acetaminophen get medical help right away. Too much acetaminophen can be very dangerous and cause liver damage. Even if you do not have symptoms, it is important to get help right away.  You may get drowsy or dizzy. Do not drive, use machinery, or do anything that needs mental alertness until you know how this medicine affects you. Do not stand or sit up quickly, especially if you are an older patient. This reduces the risk of dizzy or fainting spells. Alcohol may interfere with the effect of this medicine. Avoid alcoholic drinks.  The medicine will cause constipation. Try to have a bowel movement at least every 2 to 3 days. If you do not have a bowel movement for 3 days, call your doctor or health care professional.  Your mouth may get dry. Chewing sugarless gum or sucking hard candy, and drinking plenty of water may help. Contact your doctor if the problem does not go away or is severe.  NOTE:This sheet is a summary. It may not cover all possible information. If you have questions about this medicine, talk to your doctor, pharmacist, or health care provider. Copyright© 2017 [employer]        Cephalexin tablets or capsules  What is this medicine?  CEPHALEXIN (sef a IRINA in) is a cephalosporin antibiotic. It is used to treat certain kinds of bacterial infections It will not work for colds, flu, or other viral infections.  How should I use this medicine?  Take this medicine by mouth with a full glass of  water. Follow the directions on the prescription label. This medicine can be taken with or without food. Take your medicine at regular intervals. Do not take your medicine more often than directed. Take all of your medicine as directed even if you think you are better. Do not skip doses or stop your medicine early.  Talk to your pediatrician regarding the use of this medicine in children. While this drug may be prescribed for selected conditions, precautions do apply.  What side effects may I notice from receiving this medicine?  Side effects that you should report to your doctor or health care professional as soon as possible:  · allergic reactions like skin rash, itching or hives, swelling of the face, lips, or tongue  · breathing problems  · pain or trouble passing urine  · redness, blistering, peeling or loosening of the skin, including inside the mouth  · severe or watery diarrhea  · unusually weak or tired  · yellowing of the eyes, skin  Side effects that usually do not require medical attention (report to your doctor or health care professional if they continue or are bothersome):  · gas or heartburn  · genital or anal irritation  · headache  · joint or muscle pain  · nausea, vomiting  What may interact with this medicine?  · probenecid  · some other antibiotics  What if I miss a dose?  If you miss a dose, take it as soon as you can. If it is almost time for your next dose, take only that dose. Do not take double or extra doses. There should be at least 4 to 6 hours between doses.  Where should I keep my medicine?  Keep out of the reach of children.  Store at room temperature between 59 and 86 degrees F (15 and 30 degrees C). Throw away any unused medicine after the expiration date.  What should I tell my health care provider before I take this medicine?  They need to know if you have any of these conditions:  · kidney disease  · stomach or intestine problems, especially colitis  · an unusual or allergic  reaction to cephalexin, other cephalosporins, penicillins, other antibiotics, medicines, foods, dyes or preservatives  · pregnant or trying to get pregnant  · breast-feeding  What should I watch for while using this medicine?  Tell your doctor or health care professional if your symptoms do not begin to improve in a few days.  Do not treat diarrhea with over the counter products. Contact your doctor if you have diarrhea that lasts more than 2 days or if it is severe and watery.  If you have diabetes, you may get a false-positive result for sugar in your urine. Check with your doctor or health care professional.  NOTE:This sheet is a summary. It may not cover all possible information. If you have questions about this medicine, talk to your doctor, pharmacist, or health care provider. Copyright© 2017 [employer]

## 2019-04-22 NOTE — ANESTHESIA POSTPROCEDURE EVALUATION
Anesthesia Post Evaluation    Patient: Nain Jain    Procedure(s) Performed: Procedure(s) (LRB):  EXCISION, MASS, ABDOMEN (N/A)    Final Anesthesia Type: MAC  Patient location during evaluation: OPS  Patient participation: Yes- Able to Participate  Level of consciousness: awake and alert and oriented  Post-procedure vital signs: reviewed and stable  Pain management: adequate  Airway patency: patent  PONV status at discharge: No PONV  Anesthetic complications: no      Cardiovascular status: hemodynamically stable  Respiratory status: unassisted, spontaneous ventilation and room air  Hydration status: euvolemic  Follow-up not needed.          Vitals Value Taken Time   /84 4/22/2019  8:13 AM   Temp 36.1 °C (97 °F) 4/22/2019  8:13 AM   Pulse 83 4/22/2019  8:13 AM   Resp 18 4/22/2019  8:13 AM   SpO2 97 % 4/22/2019  8:13 AM         No case tracking events are documented in the log.      Pain/Andria Score: No data recorded

## 2019-04-22 NOTE — OP NOTE
DATE OF PROCEDURE:  04/22/2019.    PREOPERATIVE DIAGNOSIS:  Multiple abdominal wall hidradenitis masses, 5 x 4, 2 x   3 and 8 x 7 cm.    POSTOPERATIVE DIAGNOSIS:  Multiple abdominal wall hidradenitis masses, 5 x 4, 2   x 3 and 8 x 7 cm.    OPERATION:  Excision of the above masses.    ANESTHESIA:  MAC.    SURGEON:  Pj Saucedo M.D.    ANESTHESIA:  Xylocaine 1% with IV sedation.    PROCEDURE IN DETAIL:  After satisfactory IV sedation, with the patient in supine   position, abdomen was prepped and draped in normal sterile manner using   ChloraPrep.  Timeout was called, site was confirmed.  The area of the three   abdominal wall masses sizes 5 x 4, 2 x 3 and 8 x 7 cm was infiltrated using 1%   Xylocaine solution.  An elliptical incision was made completely excising all   ____ of the subcutaneous tissue.  All bleeders were clamped and bovied.    Hemostasis was satisfactorily maintained.  Wound was thoroughly irrigated with   antibiotic solution.  No drainage was noted from the area.  Wound was cauterized   using electrocautery.  Hemostasis was satisfactorily maintained.  Again, wound   was irrigated with antibiotic solution and then approximated using interrupted   3-0 Vicryl for subcutaneous tissue.  Skin was closed using running 3-0 nylon   suture.  Xeroform, 4 x 4s, sterile gauze dressing was applied.  Instrument   count, sponge count and needle count was correct.  The patient tolerated it   well.  Estimated blood loss was 30 mL.  Specimen removed was infected abdominal   wall hidradenitis masses, 5 x 4, 2 x 3 and 8 x 7 cm.  Postoperative diagnoses   Multiple abdominal wall hidradenitis masses, 5 x 4, 2 x 3 and 8 x 7 cm plus   diabetes, hypertension and obesity.      MS/IN  dd: 04/22/2019 10:39:44 (CDT)  td: 04/22/2019 10:52:52 (CDT)  Doc ID   #[id number]  Job ID #[id number]    CC:

## 2019-04-22 NOTE — TRANSFER OF CARE
"Anesthesia Transfer of Care Note    Patient: Nain Jain    Procedure(s) Performed: Procedure(s) (LRB):  EXCISION, MASS, ABDOMEN (N/A)    Patient location: OPS    Anesthesia Type: MAC    Transport from OR: Transported from OR on room air with adequate spontaneous ventilation    Post pain: adequate analgesia    Post assessment: no apparent anesthetic complications and tolerated procedure well    Post vital signs: stable    Level of consciousness: awake, alert and oriented    Nausea/Vomiting: no nausea/vomiting    Complications: none    Transfer of care protocol was followed      Last vitals:   Visit Vitals  BP (!) 145/84   Pulse 83   Temp 36.1 °C (97 °F) (Skin)   Resp 18   Ht 5' 9" (1.753 m)   Wt (!) 161.5 kg (356 lb)   SpO2 97%   BMI 52.57 kg/m²     "

## 2019-05-06 ENCOUNTER — PATIENT MESSAGE (OUTPATIENT)
Dept: INTERNAL MEDICINE | Facility: CLINIC | Age: 39
End: 2019-05-06

## 2019-07-01 ENCOUNTER — TELEPHONE (OUTPATIENT)
Dept: INTERNAL MEDICINE | Facility: CLINIC | Age: 39
End: 2019-07-01

## 2019-07-01 NOTE — TELEPHONE ENCOUNTER
----- Message from Ada Rodrigues sent at 7/1/2019  8:43 AM CDT -----  Contact: [facility] [phone]  Type: Rx Clarification/ Additional Information/ Questions    Medication:valsartan-hydrochlorothiazide (DIOVAN-HCT) 320-25 mg per tablet      What information is needed? Medication on back order    Pharmacy number:[facility] [address] [place] [address] - [facility] [address]   [phone] (Phone)  [phone] (Fax      Comments:please advise, thanks

## 2019-07-17 ENCOUNTER — PATIENT MESSAGE (OUTPATIENT)
Dept: INTERNAL MEDICINE | Facility: CLINIC | Age: 39
End: 2019-07-17

## 2019-07-17 NOTE — TELEPHONE ENCOUNTER
Due for follow up anyway. Please have him schedule a f/u this week if possible w/ Ree. Thanks! Does he have a meter at home? If so, he should check his sugars.

## 2019-08-19 ENCOUNTER — PATIENT MESSAGE (OUTPATIENT)
Dept: INTERNAL MEDICINE | Facility: CLINIC | Age: 39
End: 2019-08-19

## 2019-08-19 DIAGNOSIS — Z48.816 ENCOUNTER FOR ASSESSMENT OF CIRCUMCISION: Primary | ICD-10-CM

## 2019-08-20 ENCOUNTER — PATIENT MESSAGE (OUTPATIENT)
Dept: INTERNAL MEDICINE | Facility: CLINIC | Age: 39
End: 2019-08-20

## 2019-09-12 ENCOUNTER — PATIENT MESSAGE (OUTPATIENT)
Dept: INTERNAL MEDICINE | Facility: CLINIC | Age: 39
End: 2019-09-12

## 2019-09-13 DIAGNOSIS — E11.9 TYPE 2 DIABETES MELLITUS WITHOUT COMPLICATION, UNSPECIFIED WHETHER LONG TERM INSULIN USE: ICD-10-CM

## 2019-09-16 NOTE — PROGRESS NOTES
Subjective:       Patient ID: Nain Jain is a 37 y.o. male.    Chief Complaint: Anxiety    Cleaned his bathroom with bleach and inhaled the fumes.  Initially had bad cough.  This has improved except when he lies down.  So now when he tries to sleep the cough gets bad, he gets very anxious that he has permanently injured his lungs with bleach and then he cannot sleep      Anxiety   Patient reports no chest pain, dizziness, nausea, palpitations or shortness of breath.         Review of Systems   Constitutional: Negative for activity change, appetite change and fever.   HENT: Negative for congestion, postnasal drip and sore throat.    Respiratory: Negative for cough, shortness of breath and wheezing.    Cardiovascular: Negative for chest pain and palpitations.   Gastrointestinal: Negative for abdominal pain, blood in stool, constipation, diarrhea, nausea and vomiting.   Genitourinary: Negative for decreased urine volume, difficulty urinating, flank pain and frequency.   Musculoskeletal: Negative for arthralgias.   Neurological: Negative for dizziness, weakness and headaches.       Objective:      Physical Exam   Constitutional: He is oriented to person, place, and time. He appears well-developed and well-nourished. No distress.   HENT:   Head: Normocephalic and atraumatic.   Right Ear: External ear normal.   Left Ear: External ear normal.   Eyes: Conjunctivae and EOM are normal. Pupils are equal, round, and reactive to light.   Neck: Normal range of motion. Neck supple. No thyromegaly present.   Cardiovascular: Normal rate and regular rhythm.    Pulmonary/Chest: Effort normal and breath sounds normal.   Abdominal: Soft. Bowel sounds are normal. He exhibits no mass. There is no tenderness. There is no rebound and no guarding.   Musculoskeletal: Normal range of motion.   Lymphadenopathy:     He has no cervical adenopathy.   Neurological: He is alert and oriented to person, place, and time. He has normal reflexes. He  displays normal reflexes. No cranial nerve deficit. He exhibits normal muscle tone. Coordination normal.   Skin: Skin is warm and dry.       Assessment:       1. Bronchitis due to fumes and vapors    2. Anxiety    3. Insomnia, unspecified type        Plan:   Nain was seen today for anxiety.    Diagnoses and all orders for this visit:    Bronchitis due to fumes and vapors    Anxiety    Insomnia, unspecified type    Other orders  -     betamethasone acetate-betamethasone sodium phosphate injection 12 mg; Inject 2 mLs (12 mg total) into the muscle one time.  -     ALPRAZolam (XANAX) 0.5 MG tablet; Take 1 tablet (0.5 mg total) by mouth 3 (three) times daily as needed for Anxiety.       Abdomen soft, non-tender, no guarding.

## 2019-09-20 ENCOUNTER — IMMUNIZATION (OUTPATIENT)
Dept: PHARMACY | Facility: CLINIC | Age: 39
End: 2019-09-20
Payer: COMMERCIAL

## 2019-09-20 ENCOUNTER — OFFICE VISIT (OUTPATIENT)
Dept: INTERNAL MEDICINE | Facility: CLINIC | Age: 39
End: 2019-09-20
Payer: COMMERCIAL

## 2019-09-20 VITALS
DIASTOLIC BLOOD PRESSURE: 80 MMHG | TEMPERATURE: 99 F | SYSTOLIC BLOOD PRESSURE: 150 MMHG | BODY MASS INDEX: 46.65 KG/M2 | WEIGHT: 315 LBS | HEIGHT: 69 IN | OXYGEN SATURATION: 97 % | HEART RATE: 88 BPM

## 2019-09-20 DIAGNOSIS — E11.69 HYPERLIPIDEMIA ASSOCIATED WITH TYPE 2 DIABETES MELLITUS: ICD-10-CM

## 2019-09-20 DIAGNOSIS — I15.2 HYPERTENSION ASSOCIATED WITH DIABETES: ICD-10-CM

## 2019-09-20 DIAGNOSIS — E11.69 DIABETES MELLITUS TYPE 2 IN OBESE: Primary | ICD-10-CM

## 2019-09-20 DIAGNOSIS — E78.5 HYPERLIPIDEMIA ASSOCIATED WITH TYPE 2 DIABETES MELLITUS: ICD-10-CM

## 2019-09-20 DIAGNOSIS — E11.59 HYPERTENSION ASSOCIATED WITH DIABETES: ICD-10-CM

## 2019-09-20 DIAGNOSIS — E66.01 MORBID OBESITY WITH BMI OF 50.0-59.9, ADULT: ICD-10-CM

## 2019-09-20 DIAGNOSIS — E66.9 DIABETES MELLITUS TYPE 2 IN OBESE: Primary | ICD-10-CM

## 2019-09-20 PROBLEM — E11.9 TYPE 2 DIABETES MELLITUS WITHOUT COMPLICATION, WITHOUT LONG-TERM CURRENT USE OF INSULIN: Status: RESOLVED | Noted: 2017-05-15 | Resolved: 2019-09-20

## 2019-09-20 PROBLEM — R06.02 SOB (SHORTNESS OF BREATH): Status: RESOLVED | Noted: 2017-04-20 | Resolved: 2019-09-20

## 2019-09-20 PROCEDURE — 3008F BODY MASS INDEX DOCD: CPT | Mod: CPTII,S$GLB,, | Performed by: PHYSICIAN ASSISTANT

## 2019-09-20 PROCEDURE — 99214 OFFICE O/P EST MOD 30 MIN: CPT | Mod: S$GLB,,, | Performed by: PHYSICIAN ASSISTANT

## 2019-09-20 PROCEDURE — 3008F PR BODY MASS INDEX (BMI) DOCUMENTED: ICD-10-PCS | Mod: CPTII,S$GLB,, | Performed by: PHYSICIAN ASSISTANT

## 2019-09-20 PROCEDURE — 99999 PR PBB SHADOW E&M-EST. PATIENT-LVL IV: CPT | Mod: PBBFAC,,, | Performed by: PHYSICIAN ASSISTANT

## 2019-09-20 PROCEDURE — 3046F HEMOGLOBIN A1C LEVEL >9.0%: CPT | Mod: CPTII,S$GLB,, | Performed by: PHYSICIAN ASSISTANT

## 2019-09-20 PROCEDURE — 99999 PR PBB SHADOW E&M-EST. PATIENT-LVL IV: ICD-10-PCS | Mod: PBBFAC,,, | Performed by: PHYSICIAN ASSISTANT

## 2019-09-20 PROCEDURE — 3077F PR MOST RECENT SYSTOLIC BLOOD PRESSURE >= 140 MM HG: ICD-10-PCS | Mod: CPTII,S$GLB,, | Performed by: PHYSICIAN ASSISTANT

## 2019-09-20 PROCEDURE — 3079F DIAST BP 80-89 MM HG: CPT | Mod: CPTII,S$GLB,, | Performed by: PHYSICIAN ASSISTANT

## 2019-09-20 PROCEDURE — 3079F PR MOST RECENT DIASTOLIC BLOOD PRESSURE 80-89 MM HG: ICD-10-PCS | Mod: CPTII,S$GLB,, | Performed by: PHYSICIAN ASSISTANT

## 2019-09-20 PROCEDURE — 3077F SYST BP >= 140 MM HG: CPT | Mod: CPTII,S$GLB,, | Performed by: PHYSICIAN ASSISTANT

## 2019-09-20 PROCEDURE — 3046F PR MOST RECENT HEMOGLOBIN A1C LEVEL > 9.0%: ICD-10-PCS | Mod: CPTII,S$GLB,, | Performed by: PHYSICIAN ASSISTANT

## 2019-09-20 PROCEDURE — 99214 PR OFFICE/OUTPT VISIT, EST, LEVL IV, 30-39 MIN: ICD-10-PCS | Mod: S$GLB,,, | Performed by: PHYSICIAN ASSISTANT

## 2019-09-20 RX ORDER — HYDROCHLOROTHIAZIDE 25 MG/1
25 TABLET ORAL DAILY
Refills: 3 | COMMUNITY
Start: 2019-07-01 | End: 2019-09-20 | Stop reason: ALTCHOICE

## 2019-09-20 RX ORDER — VALSARTAN 320 MG/1
320 TABLET ORAL DAILY
Refills: 3 | COMMUNITY
Start: 2019-07-01 | End: 2019-09-20 | Stop reason: ALTCHOICE

## 2019-09-20 NOTE — PROGRESS NOTES
Subjective:       Patient ID: Nain Jain is a 38 y.o. male.    Chief Complaint: Annual Exam (pt states that he had physical exam done by [employer]D on last thursday and his glucose reading was 318, so he is here today to have labs drawn) and Diabetes (pt needs  AC1 labs)    Patient presents for follow-up of diabetes and hypertension.  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  He notes he has been noncompliant with his diet and has been enjoying life.  He has been out of his cholesterol medication for a year.  He denies any chest pain, leg swelling, shortness of breath, vision changes or numbness and tingling in the legs.  He states he is adherent with his metformin dose and takes 2000 mg a day.  His medications as of this current visit are as follows:      Current Outpatient Medications:     acyclovir (ZOVIRAX) 400 MG tablet, Take 1 tablet (400 mg total) by mouth 2 (two) times daily., Disp: 60 tablet, Rfl: 11    albuterol (PROVENTIL/VENTOLIN HFA) 90 mcg/actuation inhaler, Inhale 2 puffs into the lungs every 6 (six) hours as needed for Wheezing or Shortness of Breath. Rescue, Disp: 1 Inhaler, Rfl: 0    ALPRAZolam (XANAX) 0.5 MG tablet, Take 1 tablet (0.5 mg total) by mouth 2 (two) times daily as needed for Anxiety., Disp: 6 tablet, Rfl: 0    amLODIPine (NORVASC) 10 MG tablet, Take 1 tablet (10 mg total) by mouth every morning., Disp: 90 tablet, Rfl: 3    escitalopram oxalate (LEXAPRO) 20 MG tablet, Take 1 tablet (20 mg total) by mouth once daily., Disp: 30 tablet, Rfl: 11    metFORMIN (FORTAMET) 500 mg 24 hr tablet, Take 2 tablets (1,000 mg total) by mouth 2 (two) times daily with meals., Disp: 360 tablet, Rfl: 3    rosuvastatin (CRESTOR) 40 MG Tab, Take 1 tablet (40 mg total) by mouth every evening., Disp: 90 tablet, Rfl: 3    valsartan-hydrochlorothiazide (DIOVAN-HCT) 320-25 mg per tablet, Take 1 tablet by mouth once daily., Disp: 90 tablet, Rfl: 3    flu vacc  [id number] 6mos up,PF, (FLUARIX QUAD [id number], PF,) 60 mcg (15 mcg x 4)/0.5 mL Syrg, Inject 0.5 mLs into the muscle once. For one dose. for 1 dose, Disp: 0.5 mL, Rfl: 0  No current facility-administered medications for this visit.         Review of Systems   Constitutional: Negative for activity change, appetite change, chills, fatigue, fever and unexpected weight change.   HENT: Negative.  Negative for nosebleeds.    Eyes: Negative for photophobia, pain, discharge, redness and visual disturbance.   Respiratory: Negative for cough, chest tightness, shortness of breath and wheezing.    Cardiovascular: Negative for chest pain, palpitations and leg swelling.   Gastrointestinal: Negative for abdominal distention, abdominal pain, blood in stool, constipation, diarrhea, nausea and vomiting.   Genitourinary: Negative for decreased urine volume, difficulty urinating, dysuria, frequency, hematuria and urgency.   Musculoskeletal: Negative for arthralgias, back pain, gait problem, myalgias and neck pain.   Neurological: Negative for dizziness, syncope, weakness, light-headedness, numbness and headaches.       Objective:      Physical Exam   Constitutional: He is oriented to person, place, and time. He appears well-developed and well-nourished. No distress.   HENT:   Head: Normocephalic and atraumatic.   Right Ear: External ear normal.   Left Ear: External ear normal.   Mouth/Throat: No oropharyngeal exudate.   Eyes: Pupils are equal, round, and reactive to light. Conjunctivae and EOM are normal. Right eye exhibits no discharge. Left eye exhibits no discharge.   Neck: Normal range of motion. Neck supple. No JVD present. No thyromegaly present.   Cardiovascular: Normal rate, regular rhythm and normal heart sounds. Exam reveals no gallop and no friction rub.   No murmur heard.  Pulses:       Dorsalis pedis pulses are 2+ on the right side, and 2+ on the left side.        Posterior tibial pulses are 2+ on the right side, and 2+ on  the left side.   Pulmonary/Chest: Effort normal and breath sounds normal. No respiratory distress. He has no wheezes. He has no rales. He exhibits no tenderness.   Abdominal: Soft. Bowel sounds are normal. He exhibits no distension and no mass. There is no tenderness. There is no guarding.   Musculoskeletal: Normal range of motion.        Right foot: There is normal range of motion and no deformity.        Left foot: There is normal range of motion and no deformity.   Feet:   Right Foot:   Protective Sensation: 5 sites tested. 5 sites sensed.   Skin Integrity: Positive for dry skin. Negative for ulcer, blister, skin breakdown, erythema, warmth or callus.   Left Foot:   Protective Sensation: 5 sites tested. 5 sites sensed.   Skin Integrity: Positive for dry skin. Negative for ulcer, blister, skin breakdown, erythema, warmth or callus.   Lymphadenopathy:     He has no cervical adenopathy.   Neurological: He is alert and oriented to person, place, and time.   Skin: Skin is warm and dry. He is not diaphoretic.   Psychiatric: He has a normal mood and affect. His behavior is normal. Judgment and thought content normal.       Assessment:       1. Diabetes mellitus type 2 in obese    2. Hypertension associated with diabetes    3. Hyperlipidemia associated with type 2 diabetes mellitus    4. Morbid obesity with BMI of 50.0-59.9, adult        Plan:       Nain was seen today for annual exam and diabetes.    Diagnoses and all orders for this visit:    Diabetes mellitus type 2 in obese  -     CBC auto differential; Future  -     Comprehensive metabolic panel; Future  -     Lipid panel; Future  -     Hemoglobin A1c; Future  -     TSH; Future  -     Microalbumin/creatinine urine ratio; Future  -     [facility] Enroll[facility] Order  -     [facility]): Assign Onboarding Questionnaires    Hypertension associated with diabetes  -     CBC auto differential; Future  -     Comprehensive  metabolic panel; Future  -     Lipid panel; Future  -     Hemoglobin A1c; Future  -     TSH; Future  -     Hypertension Digital Medicine (HDMP) Enrollment Order  -     Hypertension Digital Medicine (HDMP): Assign Onboarding Questionnaires    Hyperlipidemia associated with type 2 diabetes mellitus  -     CBC auto differential; Future  -     Comprehensive metabolic panel; Future  -     Lipid panel; Future  -     Hemoglobin A1c; Future  -     TSH; Future    Morbid obesity with BMI of 50.0-59.9, adult     In rolled patient in digital hypertension to see what the cash pay price would be.  Patient is very interested in the service.  In he will come in and get blood work tomorrow.  I will then fill out the form dependent on the results.  Encouraged compliance with diet and will hold off on any medication changes until lab results known.  Patient is to receive the flu vaccine today.

## 2019-09-26 ENCOUNTER — LAB VISIT (OUTPATIENT)
Dept: LAB | Facility: HOSPITAL | Age: 39
End: 2019-09-26
Payer: COMMERCIAL

## 2019-09-26 ENCOUNTER — PATIENT MESSAGE (OUTPATIENT)
Dept: INTERNAL MEDICINE | Facility: CLINIC | Age: 39
End: 2019-09-26

## 2019-09-26 DIAGNOSIS — E11.69 DIABETES MELLITUS TYPE 2 IN OBESE: ICD-10-CM

## 2019-09-26 DIAGNOSIS — E66.9 DIABETES MELLITUS TYPE 2 IN OBESE: ICD-10-CM

## 2019-09-26 LAB
ALBUMIN/CREAT UR: 3.5 UG/MG (ref 0–30)
CREAT UR-MCNC: 171 MG/DL (ref 23–375)
MICROALBUMIN UR DL<=1MG/L-MCNC: 6 UG/ML

## 2019-09-26 PROCEDURE — 82043 UR ALBUMIN QUANTITATIVE: CPT

## 2019-09-27 DIAGNOSIS — E11.69 HYPERLIPIDEMIA ASSOCIATED WITH TYPE 2 DIABETES MELLITUS: ICD-10-CM

## 2019-09-27 DIAGNOSIS — E78.5 HYPERLIPIDEMIA ASSOCIATED WITH TYPE 2 DIABETES MELLITUS: ICD-10-CM

## 2019-09-27 RX ORDER — ROSUVASTATIN CALCIUM 40 MG/1
40 TABLET, COATED ORAL NIGHTLY
Qty: 90 TABLET | Refills: 3 | Status: SHIPPED | OUTPATIENT
Start: 2019-09-27 | End: 2020-09-24 | Stop reason: SDUPTHER

## 2019-09-27 RX ORDER — GLIPIZIDE 5 MG/1
5 TABLET, FILM COATED, EXTENDED RELEASE ORAL
Qty: 90 TABLET | Refills: 3 | Status: SHIPPED | OUTPATIENT
Start: 2019-09-27 | End: 2020-04-20 | Stop reason: SDUPTHER

## 2019-09-27 NOTE — TELEPHONE ENCOUNTER
He needs to refrain from tylenol use and cut down on drinking. I am going to add a second medication for his diabetes.  If we can keep his sugars under better control then he can go back.  He needs to keep a log and come in for a blood test in 2 weeks around Oct 14

## 2019-09-27 NOTE — TELEPHONE ENCOUNTER
Informed pt medications were sent to pharmacy and to keep a lof of his sugars. Also informed pt to refrain from tylenol and cut down alcohol. Pt understood. 2 week appt scheduled for Oct 14th.

## 2019-09-27 NOTE — TELEPHONE ENCOUNTER
Pt informed of results. Stated he needs refills on his cholesterol medication. He has been out for a while. Stated too long. He has been out of his metformin for two weeks. Has been drinking alcohol and taking tylenol. Wants to know when can he come back and see you to get cleared for the DOT physical? Stated the physical expires by [birth year]. Please send medications to [facility] on [address].

## 2019-09-27 NOTE — TELEPHONE ENCOUNTER
----- Message from SANDI Looney sent at 9/27/2019  8:31 AM CDT -----  Please call patient with lab results.  His thyroid level is a little lower than normal meaning he is got a hyperactive thyroid we will keep an eye on this for now.  His cholesterol is out of control, has he been taking the Crestor every day?  Also his diabetes is way out of control.  His A1c is up from 6.4 to12.8 in the last year.  Has he been taking his metformin twice a day every day or was there.  Of time that he went without medication?  Also his liver enzymes have increased.  Has he been drinking a lot of alcohol or taking a lot of Tylenol?  At this point I cannot clear him on his DOT physical with all of these uncontrolled problems. We will likely need to add on more medication for his diabetes but let me know what meds he has been taking

## 2019-10-03 ENCOUNTER — PATIENT MESSAGE (OUTPATIENT)
Dept: OPHTHALMOLOGY | Facility: CLINIC | Age: 39
End: 2019-10-03

## 2019-10-03 ENCOUNTER — PATIENT MESSAGE (OUTPATIENT)
Dept: INTERNAL MEDICINE | Facility: CLINIC | Age: 39
End: 2019-10-03

## 2019-10-03 DIAGNOSIS — B00.52 HERPES KERATITIS: ICD-10-CM

## 2019-10-03 RX ORDER — ACYCLOVIR 400 MG/1
400 TABLET ORAL 2 TIMES DAILY
Qty: 60 TABLET | Refills: 5 | Status: SHIPPED | OUTPATIENT
Start: 2019-10-03 | End: 2019-10-04 | Stop reason: SDUPTHER

## 2019-10-04 ENCOUNTER — PATIENT MESSAGE (OUTPATIENT)
Dept: OPHTHALMOLOGY | Facility: CLINIC | Age: 39
End: 2019-10-04

## 2019-10-04 ENCOUNTER — PATIENT MESSAGE (OUTPATIENT)
Dept: INTERNAL MEDICINE | Facility: CLINIC | Age: 39
End: 2019-10-04

## 2019-10-04 ENCOUNTER — TELEPHONE (OUTPATIENT)
Dept: OPHTHALMOLOGY | Facility: CLINIC | Age: 39
End: 2019-10-04

## 2019-10-04 DIAGNOSIS — B00.52 HERPES KERATITIS: ICD-10-CM

## 2019-10-04 RX ORDER — ACYCLOVIR 400 MG/1
400 TABLET ORAL 2 TIMES DAILY
Qty: 60 TABLET | Refills: 6 | Status: SHIPPED | OUTPATIENT
Start: 2019-10-04 | End: 2020-09-24 | Stop reason: SDUPTHER

## 2019-10-07 ENCOUNTER — TELEPHONE (OUTPATIENT)
Dept: OPHTHALMOLOGY | Facility: CLINIC | Age: 39
End: 2019-10-07

## 2019-10-08 ENCOUNTER — TELEPHONE (OUTPATIENT)
Dept: INTERNAL MEDICINE | Facility: CLINIC | Age: 39
End: 2019-10-08

## 2019-10-08 NOTE — TELEPHONE ENCOUNTER
----- Message from Lurdes Valladares sent at 10/8/2019 12:19 PM CDT -----  Prior Authorization Needed    Medication: acyclovir (ZOVIRAX) 400 MG tablet    Pharmacy Info: [facility] - [place], [place] - [facility] [address]    Plan does not cover this medication. Please call plan at [phone]      to initiate prior authorization or call/fax pharmacy to change medication. Patient ID# [id number]    Note chart when prior authorization has been submitted.    Please notify pharmacy when prior authorization has been approved.    Thank You

## 2019-10-10 ENCOUNTER — TELEPHONE (OUTPATIENT)
Dept: OPTOMETRY | Facility: CLINIC | Age: 39
End: 2019-10-10

## 2019-10-14 ENCOUNTER — PATIENT OUTREACH (OUTPATIENT)
Dept: ADMINISTRATIVE | Facility: OTHER | Age: 39
End: 2019-10-14

## 2019-10-14 ENCOUNTER — OFFICE VISIT (OUTPATIENT)
Dept: INTERNAL MEDICINE | Facility: CLINIC | Age: 39
End: 2019-10-14
Payer: COMMERCIAL

## 2019-10-14 VITALS
WEIGHT: 315 LBS | HEART RATE: 81 BPM | SYSTOLIC BLOOD PRESSURE: 132 MMHG | HEIGHT: 69 IN | OXYGEN SATURATION: 99 % | BODY MASS INDEX: 46.65 KG/M2 | DIASTOLIC BLOOD PRESSURE: 76 MMHG

## 2019-10-14 DIAGNOSIS — E11.59 HYPERTENSION ASSOCIATED WITH DIABETES: ICD-10-CM

## 2019-10-14 DIAGNOSIS — E11.69 HYPERLIPIDEMIA ASSOCIATED WITH TYPE 2 DIABETES MELLITUS: ICD-10-CM

## 2019-10-14 DIAGNOSIS — I15.2 HYPERTENSION ASSOCIATED WITH DIABETES: ICD-10-CM

## 2019-10-14 DIAGNOSIS — E78.5 HYPERLIPIDEMIA ASSOCIATED WITH TYPE 2 DIABETES MELLITUS: ICD-10-CM

## 2019-10-14 DIAGNOSIS — E11.69 DIABETES MELLITUS TYPE 2 IN OBESE: Primary | ICD-10-CM

## 2019-10-14 DIAGNOSIS — E66.9 DIABETES MELLITUS TYPE 2 IN OBESE: Primary | ICD-10-CM

## 2019-10-14 DIAGNOSIS — E66.01 MORBID OBESITY WITH BMI OF 45.0-49.9, ADULT: ICD-10-CM

## 2019-10-14 PROBLEM — L73.2 HIDRADENITIS: Status: RESOLVED | Noted: 2019-04-15 | Resolved: 2019-10-14

## 2019-10-14 LAB — GLUCOSE SERPL-MCNC: 110 MG/DL (ref 70–110)

## 2019-10-14 PROCEDURE — 3075F PR MOST RECENT SYSTOLIC BLOOD PRESS GE 130-139MM HG: ICD-10-PCS | Mod: CPTII,S$GLB,, | Performed by: PHYSICIAN ASSISTANT

## 2019-10-14 PROCEDURE — 99214 PR OFFICE/OUTPT VISIT, EST, LEVL IV, 30-39 MIN: ICD-10-PCS | Mod: S$GLB,,, | Performed by: PHYSICIAN ASSISTANT

## 2019-10-14 PROCEDURE — 3046F PR MOST RECENT HEMOGLOBIN A1C LEVEL > 9.0%: ICD-10-PCS | Mod: CPTII,S$GLB,, | Performed by: PHYSICIAN ASSISTANT

## 2019-10-14 PROCEDURE — 3075F SYST BP GE 130 - 139MM HG: CPT | Mod: CPTII,S$GLB,, | Performed by: PHYSICIAN ASSISTANT

## 2019-10-14 PROCEDURE — 3078F PR MOST RECENT DIASTOLIC BLOOD PRESSURE < 80 MM HG: ICD-10-PCS | Mod: CPTII,S$GLB,, | Performed by: PHYSICIAN ASSISTANT

## 2019-10-14 PROCEDURE — 3078F DIAST BP <80 MM HG: CPT | Mod: CPTII,S$GLB,, | Performed by: PHYSICIAN ASSISTANT

## 2019-10-14 PROCEDURE — 3008F PR BODY MASS INDEX (BMI) DOCUMENTED: ICD-10-PCS | Mod: CPTII,S$GLB,, | Performed by: PHYSICIAN ASSISTANT

## 2019-10-14 PROCEDURE — 3046F HEMOGLOBIN A1C LEVEL >9.0%: CPT | Mod: CPTII,S$GLB,, | Performed by: PHYSICIAN ASSISTANT

## 2019-10-14 PROCEDURE — 82962 GLUCOSE BLOOD TEST: CPT | Mod: S$GLB,,, | Performed by: PHYSICIAN ASSISTANT

## 2019-10-14 PROCEDURE — 3008F BODY MASS INDEX DOCD: CPT | Mod: CPTII,S$GLB,, | Performed by: PHYSICIAN ASSISTANT

## 2019-10-14 PROCEDURE — 82962 POCT GLUCOSE, HAND-HELD DEVICE: ICD-10-PCS | Mod: S$GLB,,, | Performed by: PHYSICIAN ASSISTANT

## 2019-10-14 PROCEDURE — 99999 PR PBB SHADOW E&M-EST. PATIENT-LVL III: ICD-10-PCS | Mod: PBBFAC,,, | Performed by: PHYSICIAN ASSISTANT

## 2019-10-14 PROCEDURE — 99214 OFFICE O/P EST MOD 30 MIN: CPT | Mod: S$GLB,,, | Performed by: PHYSICIAN ASSISTANT

## 2019-10-14 PROCEDURE — 99999 PR PBB SHADOW E&M-EST. PATIENT-LVL III: CPT | Mod: PBBFAC,,, | Performed by: PHYSICIAN ASSISTANT

## 2019-10-14 RX ORDER — VALSARTAN 320 MG/1
320 TABLET ORAL DAILY
Qty: 90 TABLET | Refills: 3 | Status: SHIPPED | OUTPATIENT
Start: 2019-10-14 | End: 2020-09-24 | Stop reason: SDUPTHER

## 2019-10-14 RX ORDER — HYDROCHLOROTHIAZIDE 25 MG/1
25 TABLET ORAL DAILY
Qty: 90 TABLET | Refills: 3 | Status: SHIPPED | OUTPATIENT
Start: 2019-10-14 | End: 2020-09-24 | Stop reason: SDUPTHER

## 2019-10-14 NOTE — PROGRESS NOTES
Subjective:       Patient ID: Nain Jain is a 38 y.o. male.    Chief Complaint: Follow-up    Patient returns for follow-up of his diabetes.  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  His most recent blood work is as follows:    Lab Results       Component                Value               Date                       WBC                      5.57                09/26/2019                 HGB                      14.4                09/26/2019                 HCT                      41.6                09/26/2019                 PLT                      335                 09/26/2019                 CHOL                     317 (H)             09/26/2019                 TRIG                     116                 09/26/2019                 HDL                      34 (L)              09/26/2019                 ALT                      52 (H)              09/26/2019                 AST                      54 (H)              09/26/2019                 NA                       137                 09/26/2019                 K                        4.1                 09/26/2019                 CL                       101                 09/26/2019                 CREATININE               1.0                 09/26/2019                 BUN                      11                  09/26/2019                 CO2                      25                  09/26/2019                 TSH                      0.331 (L)           09/26/2019                 INR                      0.9                 05/22/2018                 HGBA1C                   12.8 (H)            09/26/2019              He has lost about 12 lb in the last 3 weeks and has been watching his diet.  He states his average blood sugars are between 101 20 now in the morning.  He is tolerating the medication well.  He denies any other complaints today.    Review of Systems   Constitutional: Negative  for activity change, appetite change, fatigue and unexpected weight change.   HENT: Negative for nosebleeds.    Eyes: Negative for pain and visual disturbance.   Respiratory: Negative for chest tightness, shortness of breath and wheezing.    Cardiovascular: Negative for chest pain, palpitations and leg swelling.   Musculoskeletal: Negative for neck pain.   Neurological: Negative for dizziness, syncope, light-headedness and headaches.       Objective:      Physical Exam   Constitutional: He is oriented to person, place, and time. He appears well-developed and well-nourished. No distress.   HENT:   Head: Normocephalic and atraumatic.   Eyes: Pupils are equal, round, and reactive to light. Conjunctivae are normal.   Neck: Normal range of motion. Neck supple. No JVD present. No thyromegaly present.   Cardiovascular: Normal rate and regular rhythm. Exam reveals no gallop and no friction rub.   No murmur heard.  Pulmonary/Chest: Effort normal. No respiratory distress. He has no wheezes. He has no rales. He exhibits no tenderness.   Neurological: He is alert and oriented to person, place, and time.   Skin: He is not diaphoretic.       Assessment:       1. Diabetes mellitus type 2 in obese    2. Morbid obesity with BMI of 45.0-49.9, adult    3. Hyperlipidemia associated with type 2 diabetes mellitus    4. Hypertension associated with diabetes        Plan:       Nain was seen today for follow-up.    Diagnoses and all orders for this visit:    Diabetes mellitus type 2 in obese  Comments:  Improving, his point of care glucose today was 110  Orders:  -     POCT Glucose, Hand-Held Device  -     Hemoglobin A1c; Future  -     CBC auto differential; Future  -     Comprehensive metabolic panel; Future  -     Lipid panel; Future    Morbid obesity with BMI of 45.0-49.9, adult  Comments:  Improving, continue diet    Hyperlipidemia associated with type 2 diabetes mellitus  Comments:  Uncontrolled, labs in 2 months    Hypertension  associated with diabetes  Comments:  Controlled, continue current regimen  Orders:  -     valsartan (DIOVAN) 320 MG tablet; Take 1 tablet (320 mg total) by mouth once daily.  -     hydroCHLOROthiazide (HYDRODIURIL) 25 MG tablet; Take 1 tablet (25 mg total) by mouth once daily.     D OT paperwork was filled out and sent for scanning.  Original was given to patient.  He is to return to clinic in 2 months for routine blood work and call with any problems in the interim

## 2019-10-15 ENCOUNTER — PATIENT MESSAGE (OUTPATIENT)
Dept: OPHTHALMOLOGY | Facility: CLINIC | Age: 39
End: 2019-10-15

## 2019-10-22 ENCOUNTER — PATIENT OUTREACH (OUTPATIENT)
Dept: ADMINISTRATIVE | Facility: OTHER | Age: 39
End: 2019-10-22

## 2019-11-12 ENCOUNTER — PATIENT MESSAGE (OUTPATIENT)
Dept: OPHTHALMOLOGY | Facility: CLINIC | Age: 39
End: 2019-11-12

## 2019-12-22 DIAGNOSIS — F43.21 ADJUSTMENT DISORDER WITH DEPRESSED MOOD: ICD-10-CM

## 2019-12-22 DIAGNOSIS — F41.9 ANXIETY: ICD-10-CM

## 2019-12-23 ENCOUNTER — PATIENT OUTREACH (OUTPATIENT)
Dept: ADMINISTRATIVE | Facility: OTHER | Age: 39
End: 2019-12-23

## 2019-12-23 RX ORDER — ESCITALOPRAM OXALATE 20 MG/1
TABLET ORAL
Qty: 30 TABLET | Refills: 0 | Status: SHIPPED | OUTPATIENT
Start: 2019-12-23 | End: 2020-01-25

## 2020-01-08 NOTE — PROGRESS NOTES
NML EKG:  no further testing required Advancement-Rotation Flap Text: The defect edges were debeveled with a #15 scalpel blade.  Given the location of the defect, shape of the defect and the proximity to free margins an advancement-rotation flap was deemed most appropriate.  Using a sterile surgical marker, an appropriate flap was drawn incorporating the defect and placing the expected incisions within the relaxed skin tension lines where possible. The area thus outlined was incised deep to adipose tissue with a #15 scalpel blade.  The skin margins were undermined to an appropriate distance in all directions utilizing iris scissors.

## 2020-01-24 DIAGNOSIS — F41.9 ANXIETY: ICD-10-CM

## 2020-01-24 DIAGNOSIS — F43.21 ADJUSTMENT DISORDER WITH DEPRESSED MOOD: ICD-10-CM

## 2020-01-25 RX ORDER — METFORMIN HYDROCHLORIDE 500 MG/1
TABLET, EXTENDED RELEASE ORAL
Qty: 360 TABLET | Refills: 0 | Status: SHIPPED | OUTPATIENT
Start: 2020-01-25 | End: 2020-04-01

## 2020-01-25 RX ORDER — ESCITALOPRAM OXALATE 20 MG/1
TABLET ORAL
Qty: 90 TABLET | Refills: 0 | Status: SHIPPED | OUTPATIENT
Start: 2020-01-25 | End: 2020-06-18 | Stop reason: SDUPTHER

## 2020-01-25 NOTE — TELEPHONE ENCOUNTER
Please call pt as he needs a follow up appt w/ me. Will give refill for 3 mo but needs repeat labs.

## 2020-02-06 ENCOUNTER — TELEPHONE (OUTPATIENT)
Dept: INTERNAL MEDICINE | Facility: CLINIC | Age: 40
End: 2020-02-06

## 2020-02-20 ENCOUNTER — PATIENT MESSAGE (OUTPATIENT)
Dept: INTERNAL MEDICINE | Facility: CLINIC | Age: 40
End: 2020-02-20

## 2020-02-21 NOTE — TELEPHONE ENCOUNTER
Called pt and left vm because he did not answer explaining telemed visit and asked if he can do one for 11:45 today also sent portal message.

## 2020-02-21 NOTE — TELEPHONE ENCOUNTER
Hey, can you call pt to see if he wants to do a telemed visit for anxiety/depression around 11:45am today? Let him know I'll probably have to see him in clinic in the next few weeks but I want to at least address the anxiety/depression today.

## 2020-02-24 ENCOUNTER — PATIENT OUTREACH (OUTPATIENT)
Dept: ADMINISTRATIVE | Facility: OTHER | Age: 40
End: 2020-02-24

## 2020-02-26 ENCOUNTER — OFFICE VISIT (OUTPATIENT)
Dept: OPTOMETRY | Facility: CLINIC | Age: 40
End: 2020-02-26
Payer: COMMERCIAL

## 2020-02-26 DIAGNOSIS — E11.9 TYPE 2 DIABETES MELLITUS WITHOUT RETINOPATHY: Primary | ICD-10-CM

## 2020-02-26 DIAGNOSIS — H31.8 CHOROIDAL LESION: ICD-10-CM

## 2020-02-26 DIAGNOSIS — B00.9 RECURRENT HERPES SIMPLEX: ICD-10-CM

## 2020-02-26 PROCEDURE — 92014 COMPRE OPH EXAM EST PT 1/>: CPT | Mod: S$GLB,,, | Performed by: OPTOMETRIST

## 2020-02-26 PROCEDURE — 99999 PR PBB SHADOW E&M-EST. PATIENT-LVL II: CPT | Mod: PBBFAC,,, | Performed by: OPTOMETRIST

## 2020-02-26 PROCEDURE — 99999 PR PBB SHADOW E&M-EST. PATIENT-LVL II: ICD-10-PCS | Mod: PBBFAC,,, | Performed by: OPTOMETRIST

## 2020-02-26 PROCEDURE — 92014 PR EYE EXAM, EST PATIENT,COMPREHESV: ICD-10-PCS | Mod: S$GLB,,, | Performed by: OPTOMETRIST

## 2020-02-26 NOTE — PROGRESS NOTES
HPI     . Nain Jain was referred by for a diabetic eye exam.    Patient complains of no visual complaints today. Requesting FMLA forms to   be filled out for Herpes Keratitis OD. Pt states he had 2 flare ups last   year.  Takes acyclovir 400mg tab BID prophylactic, will take up to (6) 400mg   pills a day when flares up, oral acyclovir will clear up ocular symptoms   in a few days, (-)gtts  Uses +1.00 otc reading glasses  Would patient like a refraction today? no     Paitent denies diplopia, headaches, flashes/floaters, itching, tearing,   burning, redness, and pain.    (-)drops    (+)Diabetes  LBS   Hemoglobin A1C       Date                     Value               Ref Range             Status                09/26/2019               12.8 (H)            4.0 - 5.6 %           Final                  12/28/2018               9.9 (H)             4.0 - 5.6 %           Final                  10/24/2018               6.4 (H)             4.0 - 5.6 %           Final                OCULAR HISTORY  Last Eye Exam: 7/19/18 with Dr Casas  (-)eye surgery   (+)diagnosed or treated for any eye conditions or diseases, herpes   keratitis    FAMILY HISTORY  (-)Glaucoma        Last edited by Shelli Rodarte, OD on 2/26/2020 10:10 AM. (History)            Assessment /Plan     For exam results, see Encounter Report.    Type 2 diabetes mellitus without retinopathy    Recurrent herpes simplex    Choroidal lesion      1. No retinopathy noted, OU. Continue proper BS control and annual DM eye exams. Discussed A1c very high and risk for LOV/blindness increased with uncontrolled BS. Recommend screening eyes separately at home. If changes noted, RTC. Monitor yearly or sooner if needed.     2. H/o of herpes keratitis OD. Eye quiet today. Taking acyclovir 400mg pill BID for prophylactic purposes. Discussed if flare up occurs to RTC. Monitor.     3. H/o lesion for few years. Recommend retina specialist appointment. Pt not interested. No  change on DFE comparative to previous fundus photos. Due to retina appointment declined, monitor yearly with DFE.       RTC in 1 year for annual eye exam or sooner if needed.

## 2020-02-28 ENCOUNTER — PATIENT MESSAGE (OUTPATIENT)
Dept: OPTOMETRY | Facility: CLINIC | Age: 40
End: 2020-02-28

## 2020-03-24 ENCOUNTER — TELEPHONE (OUTPATIENT)
Dept: INTERNAL MEDICINE | Facility: CLINIC | Age: 40
End: 2020-03-24

## 2020-03-24 DIAGNOSIS — E11.69 HYPERLIPIDEMIA ASSOCIATED WITH TYPE 2 DIABETES MELLITUS: ICD-10-CM

## 2020-03-24 DIAGNOSIS — E11.69 DIABETES MELLITUS TYPE 2 IN OBESE: ICD-10-CM

## 2020-03-24 DIAGNOSIS — D75.839 THROMBOCYTOSIS: ICD-10-CM

## 2020-03-24 DIAGNOSIS — Z00.00 ANNUAL PHYSICAL EXAM: Primary | ICD-10-CM

## 2020-03-24 DIAGNOSIS — E66.9 DIABETES MELLITUS TYPE 2 IN OBESE: ICD-10-CM

## 2020-03-24 DIAGNOSIS — E05.90 SUBCLINICAL HYPERTHYROIDISM: ICD-10-CM

## 2020-03-24 DIAGNOSIS — E78.5 HYPERLIPIDEMIA ASSOCIATED WITH TYPE 2 DIABETES MELLITUS: ICD-10-CM

## 2020-03-24 NOTE — TELEPHONE ENCOUNTER
Called pt about his labs.  Pt is actually wanting to reschedule due to Covid-19  Soraya,  Can you please schedule pts 4/1 appt to 4/30 at 11:30 please?

## 2020-03-30 ENCOUNTER — NURSE TRIAGE (OUTPATIENT)
Dept: ADMINISTRATIVE | Facility: CLINIC | Age: 40
End: 2020-03-30

## 2020-03-30 NOTE — TELEPHONE ENCOUNTER
Nain Jain is a 39 y.o. male with relevant past medical history of Diabetes/Pre-diabetes and High BMI, HLD and HTN called the COVID19 hotline with concern about SOB, Fever, Muscle pain and possible/known exposure to COVID19     The pt expressed that his wife had visited someone who later turned out to be positive for COVID 19, and then after this person returned he experienced the symptoms since Saturday. He stated that he has subjective fevers but does not have access to a thermometer. He opted to go to the nearest urgent care to have his temperature measured.        the patient opted to [facility] now      I told Nain Jain to call back if she/he has any worsening symptoms, new symptoms, or further questions or concerns. Pt verbalized understanding.    I also advised the pt about emergency symptoms that would require a visit to the ED.       Gasper Yañez

## 2020-04-01 ENCOUNTER — OFFICE VISIT (OUTPATIENT)
Dept: INTERNAL MEDICINE | Facility: CLINIC | Age: 40
End: 2020-04-01
Payer: COMMERCIAL

## 2020-04-01 ENCOUNTER — HOSPITAL ENCOUNTER (OUTPATIENT)
Dept: RADIOLOGY | Facility: HOSPITAL | Age: 40
Discharge: HOME | End: 2020-04-01
Attending: INTERNAL MEDICINE
Payer: COMMERCIAL

## 2020-04-01 ENCOUNTER — TELEPHONE (OUTPATIENT)
Dept: INTERNAL MEDICINE | Facility: CLINIC | Age: 40
End: 2020-04-01

## 2020-04-01 ENCOUNTER — PATIENT MESSAGE (OUTPATIENT)
Dept: INTERNAL MEDICINE | Facility: CLINIC | Age: 40
End: 2020-04-01

## 2020-04-01 ENCOUNTER — LAB VISIT (OUTPATIENT)
Dept: LAB | Facility: HOSPITAL | Age: 40
End: 2020-04-01
Attending: INTERNAL MEDICINE
Payer: COMMERCIAL

## 2020-04-01 VITALS
SYSTOLIC BLOOD PRESSURE: 149 MMHG | DIASTOLIC BLOOD PRESSURE: 64 MMHG | RESPIRATION RATE: 16 BRPM | HEART RATE: 108 BPM | OXYGEN SATURATION: 97 %

## 2020-04-01 DIAGNOSIS — I15.2 HYPERTENSION ASSOCIATED WITH DIABETES: ICD-10-CM

## 2020-04-01 DIAGNOSIS — J18.9 PNEUMONIA OF BOTH LOWER LOBES DUE TO INFECTIOUS ORGANISM: ICD-10-CM

## 2020-04-01 DIAGNOSIS — R06.02 SOB (SHORTNESS OF BREATH): ICD-10-CM

## 2020-04-01 DIAGNOSIS — E05.90 SUBCLINICAL HYPERTHYROIDISM: ICD-10-CM

## 2020-04-01 DIAGNOSIS — R50.9 FEVER, UNSPECIFIED FEVER CAUSE: Primary | ICD-10-CM

## 2020-04-01 DIAGNOSIS — E11.65 UNCONTROLLED TYPE 2 DIABETES MELLITUS WITH HYPERGLYCEMIA: ICD-10-CM

## 2020-04-01 DIAGNOSIS — E11.69 HYPERLIPIDEMIA ASSOCIATED WITH TYPE 2 DIABETES MELLITUS: ICD-10-CM

## 2020-04-01 DIAGNOSIS — R50.9 FEVER, UNSPECIFIED FEVER CAUSE: ICD-10-CM

## 2020-04-01 DIAGNOSIS — E11.59 HYPERTENSION ASSOCIATED WITH DIABETES: ICD-10-CM

## 2020-04-01 DIAGNOSIS — J18.9 COMMUNITY ACQUIRED PNEUMONIA, UNSPECIFIED LATERALITY: Primary | ICD-10-CM

## 2020-04-01 DIAGNOSIS — Z20.822 SUSPECTED COVID-19 VIRUS INFECTION: ICD-10-CM

## 2020-04-01 DIAGNOSIS — R05.9 COUGH: ICD-10-CM

## 2020-04-01 DIAGNOSIS — R06.02 SHORTNESS OF BREATH: ICD-10-CM

## 2020-04-01 DIAGNOSIS — E78.5 HYPERLIPIDEMIA ASSOCIATED WITH TYPE 2 DIABETES MELLITUS: ICD-10-CM

## 2020-04-01 LAB
ALBUMIN SERPL BCP-MCNC: 3.9 G/DL (ref 3.5–5.2)
ALP SERPL-CCNC: 95 U/L (ref 55–135)
ALT SERPL W/O P-5'-P-CCNC: 40 U/L (ref 10–44)
ANION GAP SERPL CALC-SCNC: 10 MMOL/L (ref 8–16)
AST SERPL-CCNC: 41 U/L (ref 10–40)
BASOPHILS # BLD AUTO: 0.01 K/UL (ref 0–0.2)
BASOPHILS NFR BLD: 0.2 % (ref 0–1.9)
BILIRUB SERPL-MCNC: 0.4 MG/DL (ref 0.1–1)
BUN SERPL-MCNC: 10 MG/DL (ref 6–20)
CALCIUM SERPL-MCNC: 9.2 MG/DL (ref 8.7–10.5)
CHLORIDE SERPL-SCNC: 99 MMOL/L (ref 95–110)
CO2 SERPL-SCNC: 25 MMOL/L (ref 23–29)
CREAT SERPL-MCNC: 1 MG/DL (ref 0.5–1.4)
CRP SERPL-MCNC: 31.7 MG/L (ref 0–8.2)
D DIMER PPP IA.FEU-MCNC: 0.38 MG/L FEU
DIFFERENTIAL METHOD: ABNORMAL
EOSINOPHIL # BLD AUTO: 0 K/UL (ref 0–0.5)
EOSINOPHIL NFR BLD: 0.2 % (ref 0–8)
ERYTHROCYTE [DISTWIDTH] IN BLOOD BY AUTOMATED COUNT: 12 % (ref 11.5–14.5)
ERYTHROCYTE [SEDIMENTATION RATE] IN BLOOD BY WESTERGREN METHOD: 88 MM/HR (ref 0–23)
EST. GFR  (AFRICAN AMERICAN): >60 ML/MIN/1.73 M^2
EST. GFR  (NON AFRICAN AMERICAN): >60 ML/MIN/1.73 M^2
FERRITIN SERPL-MCNC: 197 NG/ML (ref 20–300)
GLUCOSE SERPL-MCNC: 273 MG/DL (ref 70–110)
HCT VFR BLD AUTO: 41.4 % (ref 40–54)
HGB BLD-MCNC: 13.7 G/DL (ref 14–18)
IMM GRANULOCYTES # BLD AUTO: 0.03 K/UL (ref 0–0.04)
IMM GRANULOCYTES NFR BLD AUTO: 0.6 % (ref 0–0.5)
LDH SERPL L TO P-CCNC: 330 U/L (ref 110–260)
LYMPHOCYTES # BLD AUTO: 1.7 K/UL (ref 1–4.8)
LYMPHOCYTES NFR BLD: 34.7 % (ref 18–48)
MCH RBC QN AUTO: 30.3 PG (ref 27–31)
MCHC RBC AUTO-ENTMCNC: 33.1 G/DL (ref 32–36)
MCV RBC AUTO: 92 FL (ref 82–98)
MONOCYTES # BLD AUTO: 0.5 K/UL (ref 0.3–1)
MONOCYTES NFR BLD: 9.2 % (ref 4–15)
NEUTROPHILS # BLD AUTO: 2.8 K/UL (ref 1.8–7.7)
NEUTROPHILS NFR BLD: 55.1 % (ref 38–73)
NRBC BLD-RTO: 0 /100 WBC
PLATELET # BLD AUTO: 249 K/UL (ref 150–350)
PMV BLD AUTO: 9.9 FL (ref 9.2–12.9)
POTASSIUM SERPL-SCNC: 4.3 MMOL/L (ref 3.5–5.1)
PROCALCITONIN SERPL IA-MCNC: 0.15 NG/ML
PROT SERPL-MCNC: 8.3 G/DL (ref 6–8.4)
RBC # BLD AUTO: 4.52 M/UL (ref 4.6–6.2)
SARS-COV-2 RNA RESP QL NAA+PROBE: DETECTED
SODIUM SERPL-SCNC: 134 MMOL/L (ref 136–145)
WBC # BLD AUTO: 5.01 K/UL (ref 3.9–12.7)

## 2020-04-01 PROCEDURE — 3078F DIAST BP <80 MM HG: CPT | Mod: CPTII,S$GLB,, | Performed by: INTERNAL MEDICINE

## 2020-04-01 PROCEDURE — 93005 ELECTROCARDIOGRAM TRACING: CPT | Mod: S$GLB,,, | Performed by: INTERNAL MEDICINE

## 2020-04-01 PROCEDURE — 85379 FIBRIN DEGRADATION QUANT: CPT

## 2020-04-01 PROCEDURE — 3078F PR MOST RECENT DIASTOLIC BLOOD PRESSURE < 80 MM HG: ICD-10-PCS | Mod: CPTII,S$GLB,, | Performed by: INTERNAL MEDICINE

## 2020-04-01 PROCEDURE — 82728 ASSAY OF FERRITIN: CPT

## 2020-04-01 PROCEDURE — 93010 ELECTROCARDIOGRAM REPORT: CPT | Mod: S$GLB,,, | Performed by: INTERNAL MEDICINE

## 2020-04-01 PROCEDURE — 36415 COLL VENOUS BLD VENIPUNCTURE: CPT

## 2020-04-01 PROCEDURE — 3046F PR MOST RECENT HEMOGLOBIN A1C LEVEL > 9.0%: ICD-10-PCS | Mod: CPTII,S$GLB,, | Performed by: INTERNAL MEDICINE

## 2020-04-01 PROCEDURE — 99999 PR PBB SHADOW E&M-EST. PATIENT-LVL III: ICD-10-PCS | Mod: PBBFAC,,, | Performed by: INTERNAL MEDICINE

## 2020-04-01 PROCEDURE — 3046F HEMOGLOBIN A1C LEVEL >9.0%: CPT | Mod: CPTII,S$GLB,, | Performed by: INTERNAL MEDICINE

## 2020-04-01 PROCEDURE — 99999 PR PBB SHADOW E&M-EST. PATIENT-LVL III: CPT | Mod: PBBFAC,,, | Performed by: INTERNAL MEDICINE

## 2020-04-01 PROCEDURE — 93010 EKG 12-LEAD: ICD-10-PCS | Mod: S$GLB,,, | Performed by: INTERNAL MEDICINE

## 2020-04-01 PROCEDURE — 86140 C-REACTIVE PROTEIN: CPT

## 2020-04-01 PROCEDURE — 99215 PR OFFICE/OUTPT VISIT, EST, LEVL V, 40-54 MIN: ICD-10-PCS | Mod: S$GLB,,, | Performed by: INTERNAL MEDICINE

## 2020-04-01 PROCEDURE — 71046 X-RAY EXAM CHEST 2 VIEWS: CPT | Mod: 26,,, | Performed by: RADIOLOGY

## 2020-04-01 PROCEDURE — 93005 EKG 12-LEAD: ICD-10-PCS | Mod: S$GLB,,, | Performed by: INTERNAL MEDICINE

## 2020-04-01 PROCEDURE — 84145 PROCALCITONIN (PCT): CPT

## 2020-04-01 PROCEDURE — 85025 COMPLETE CBC W/AUTO DIFF WBC: CPT

## 2020-04-01 PROCEDURE — 99215 OFFICE O/P EST HI 40 MIN: CPT | Mod: S$GLB,,, | Performed by: INTERNAL MEDICINE

## 2020-04-01 PROCEDURE — 71046 X-RAY EXAM CHEST 2 VIEWS: CPT | Mod: TC

## 2020-04-01 PROCEDURE — 3077F SYST BP >= 140 MM HG: CPT | Mod: CPTII,S$GLB,, | Performed by: INTERNAL MEDICINE

## 2020-04-01 PROCEDURE — 83615 LACTATE (LD) (LDH) ENZYME: CPT

## 2020-04-01 PROCEDURE — 71046 XR CHEST PA AND LATERAL: ICD-10-PCS | Mod: 26,,, | Performed by: RADIOLOGY

## 2020-04-01 PROCEDURE — 85652 RBC SED RATE AUTOMATED: CPT

## 2020-04-01 PROCEDURE — 3077F PR MOST RECENT SYSTOLIC BLOOD PRESSURE >= 140 MM HG: ICD-10-PCS | Mod: CPTII,S$GLB,, | Performed by: INTERNAL MEDICINE

## 2020-04-01 PROCEDURE — 80053 COMPREHEN METABOLIC PANEL: CPT

## 2020-04-01 PROCEDURE — U0002 COVID-19 LAB TEST NON-CDC: HCPCS

## 2020-04-01 RX ORDER — PROMETHAZINE HYDROCHLORIDE AND CODEINE PHOSPHATE 6.25; 1 MG/5ML; MG/5ML
5 SOLUTION ORAL EVERY 8 HOURS PRN
Qty: 180 ML | Refills: 0 | Status: SHIPPED | OUTPATIENT
Start: 2020-04-01 | End: 2020-04-07 | Stop reason: SDUPTHER

## 2020-04-01 RX ORDER — AZITHROMYCIN 250 MG/1
TABLET, FILM COATED ORAL
Qty: 6 TABLET | Refills: 0 | Status: SHIPPED | OUTPATIENT
Start: 2020-04-01 | End: 2020-04-06

## 2020-04-01 NOTE — TELEPHONE ENCOUNTER
SOb 2 days ago, got tired walking to truck, started breathing hard, not normal for me, wife checked my oxygen it was at 92, has c pap at home and I got on it to help me.  I've been waking up coughing, i'm weak, I started with diarrhea this morning, I'm wheezing, chest sore from coughing even when im not coughing it's sore, up above sternum it burned, by ribs are hurting too from coughing. I can walk just to the BR i'm winded, no movement down my arm, no blurry vision, I feel light headed, oxygen level went back up after C pap, coughing up yellow, I took my wife to see her brother in [place], he was at home at the time, Pulse ox 97%, Hr 110 upon entering room, c/o fever Monday took tylenol, Headache on Monday and Tuesday.  Right now no headache unless i'm coughing.  We couldn't find a thermometer.  He also took theraflu, it didn't seem to work head 6/10, chest pain 10/10 today it's a five. Took Albuterol this morning, that's not working, appetite is poor, he can drink a lot of water, 246 glucose, last night was his last tylenol. ~ 8 pm.

## 2020-04-01 NOTE — PROGRESS NOTES
Subjective:       Patient ID: Nain Jain is a 39 y.o. male.    Chief Complaint: sob    HPI   initially scheduled as telemed visit but pt showed up for SOB.  Immediately taken to procedure room. O2 sats checked and at 97% w/ HR of 107.    Pt reports 2 days ago was feeling fine but started having some SOB while walking out to truck. Wife, who has a pulse ox sandor on her phone, checked his o2 and it was 92%. Pt has a CPAP machine at home for his WALTER. Got on the CPAP. O2 sats went back up. Started also w/ a subjective fever in which he took tylenol.   Over the course of the past 2 days, SOB worsened and felt like he had some wheezing. Used albuterol at home (last use this morning) w/o benefit. Started w/ cough productive of yellow sputum. Has some palpitations. C/o HA that's 6 of 10. Mid CP that is 6 of 10 but gets to 10 of 10 when he coughs. No radiation. No numbness/tingling/weakness/leg swelling. No unintentional weight gain.   Diarrhea this AM. Loss of appetite throughout this. Has been keeping hydrated.   Other than the pain below both lower rib area and the substernal chest area, no myalgia. 149/64    Pt does have uncontrolled DM - last a1c 12.8 9/2019.  Reports compliance w/ meds - metformin xr 500mg 2 pills BID.  Does not check sugars at home.     HTN - reports compliance w/ meds. On amlodipine 10qd, valsartan 320mg daily, hctz 25mg daily.     Does also have anxiety and compliant w/ lexapro 20mg daily.  Have subclinical hyperthyroidism and needs repeat TFT testing.     Review of Systems  Comprehensive review of systems otherwise negative. See history/subjective section for more details.    Objective:      Physical Exam    BP (!) 149/64   Pulse 108   Resp 16   SpO2 97%     Gen - A+OX4, initially very anxious and was crying. However after we put O2 on him, he was in NAD  HEENT - PERRL, OP clear. MMM.   Neck - no LAD  CV - tachycardic but normal rhythm and no m/r  Chest - CTAB except decreased breath  sounds at the R base. No crackles/wheezing. Initially tachypneic at about 24 breaths/min but then after calming down, rep of 16. Initial O2 sats at 97% even when he c/o SOB and couldn't breathe and was at 96% after 2L NC O2. No chest tenderness/abdominal tenderness to palpation.  Abd - S/NT/ND/+BS  eXT - 2+ b radial and DP pulses. No LE edema.     Previous labs reviewed.     EKG reviewed and w/ sinus tach.    Assessment/Plan     Diagnoses and all orders for this visit:    Fever, unspecified fever cause  -     SARS- CoV-2 (COVID-19) QUALITATIVE PCR  -     X-Ray Chest PA And Lateral; Future  -     COVID-19 Home Symptom Monitoring  - Duration (days): 14    Shortness of breath and cough - sent in cough syrup.  -     IN OFFICE EKG 12-LEAD (to [facility])  -     SARS- CoV-2 (COVID-19) QUALITATIVE PCR  -     X-Ray Chest PA And Lateral; Future  -     COVID-19 Home Symptom Monitoring  - Duration (days): 14    Suspected Covid-19 Virus Infection  -     C-reactive protein; Future  -     Ferritin; Future  -     CBC auto differential; Future  -     Comprehensive metabolic panel; Future  -     D dimer, quantitative; Future  -     Procalcitonin; Future  -     Lactate dehydrogenase; Future  -     Sedimentation rate; Future  -     SARS- CoV-2 (COVID-19) QUALITATIVE PCR  -     X-Ray Chest PA And Lateral; Future  -     COVID-19 Home Symptom Monitoring  - Duration (days): 14    Uncontrolled type 2 diabetes mellitus with hyperglycemia - accucheck w/o hypoglycemia.   -     Hemoglobin A1c; Future    Hypertension associated with diabetes - Stable and controlled. Continue current medications.  -     CBC auto differential; Future    Hyperlipidemia associated with type 2 diabetes mellitus - cont crestor.  -     CBC auto differential; Future  -     Lipid panel; Future    Subclinical hyperthyroidism  -     TSH; Future  -     T4, free; Future    SOB (shortness of breath)  -     IN OFFICE EKG 12-LEAD (to [place])    Called lab to have labs drawn in  procedure room. CXR stat - spoke w/ tech.    Follow up if symptoms worsen or fail to improve.      Huyen Arias MD  Department of Internal Medicine - [facility]  8:54 AM    Addendum:  CXR w/ signs of bibasilar pneumonia. Precautions discussed w/ pt in addition to home monitoring in AVS. Labs pending. If O2<92%, pt to go to ED.     Huyen Arias MD  Department of Internal Medicine - [facility]  10:27 AM

## 2020-04-01 NOTE — PATIENT INSTRUCTIONS
Instructions for Patients Awaiting COVID-19 Test Results    You will either be called with your test result or it will be released to the patient portal.  If you have any questions about your test, please visit [email or web address] or call our COVID-19 information line at [phone].    Prevention steps for patients with confirmed or suspected COVID-19       Stay home and stay away from family members and friends. The CDC says, you can leave home after these three things have happened: 1) You have had no fever for at least 72 hours (that is three full days of no fever without the use of medicine that reduces fevers) 2) AND other symptoms have improved (for example, when your cough or shortness of breath have improved) 3) AND at least 7 days have passed since your symptoms first appeared.   Separate yourself from other people and animals in your home.   Call ahead before visiting your doctor.   Wear a facemask.   Cover your coughs and sneezes.   Wash your hands often with soap and water; hand  can be used, too.   Avoid sharing personal household items.   Wipe down surfaces used daily.   Monitor your symptoms. Seek prompt medical attention if your illness is worsening (e.g., difficulty breathing).    Before seeking care, call your healthcare provider.   If you have a medical emergency and need to call 911, notify the dispatch personnel that you have, or are being evaluated for COVID-19. If possible, put on a facemask before emergency medical services arrive.        Recommended precautions for household members, intimate partners, and caregivers in a home setting of a patient with symptomatic laboratory-confirmed COVID-19 or a patient under investigation.  Household members, intimate partners, and caregivers in the home setting awaiting tests results have close contact with a person with symptomatic, laboratory-confirmed COVID-19 or a person under investigation. Close contacts should  monitor their health; they should call their provider right away if they develop symptoms suggestive of COVID-19 (e.g., fever, cough, shortness of breath).    Close contacts should also follow these recommendations:   Make sure that you understand and can help the patient follow their provider's instructions for medication(s) and care. You should help the patient with basic needs in the home and provide support for getting groceries, prescriptions, and other personal needs.   Monitor the patient's symptoms. If the patient is getting sicker, call his or her healthcare provider and tell them that the patient has laboratory-confirmed COVID-19. If the patient has a medical emergency and you need to call 911, notify the dispatch personnel that the patient has, or is being evaluated for COVID-19.   Household members should stay in another room or be  from the patient. Household members should use a separate bedroom and bathroom, if available.   Prohibit visitors.   Household members should care for any pets in the home.   Make sure that shared spaces in the home have good air flow, such as by an air conditioner or an opened window, weather permitting.   Perform hand hygiene frequently. Wash your hands often with soap and water for at least 20 seconds or use an alcohol-based hand  (that contains > 60% alcohol) covering all surfaces of your hands and rubbing them together until they feel dry. Soap and water should be used preferentially.   Avoid touching your eyes, nose, and mouth.   The patient should wear a facemask. If the patient is not able to wear a facemask (for example, because it causes trouble breathing), caregivers should wear a mask when they are in the same room as the patient.   Wear a disposable facemask and gloves when you touch or have contact with the patient's blood, stool, or body fluids, such as saliva, sputum, nasal mucus, vomit, urine.  o Throw out disposable facemasks and  gloves after using them. Do not reuse.  o When removing personal protective equipment, first remove and dispose of gloves. Then, immediately clean your hands with soap and water or alcohol-based hand . Next, remove and dispose of facemask, and immediately clean your hands again with soap and water or alcohol-based hand .   You should not share dishes, drinking glasses, cups, eating utensils, towels, bedding, or other items with the patient. After the patient uses these items, you should wash them thoroughly (see below Wash laundry thoroughly).   Clean all high-touch surfaces, such as counters, tabletops, doorknobs, bathroom fixtures, toilets, phones, keyboards, tablets, and bedside tables, every day. Also, clean any surfaces that may have blood, stool, or body fluids on them.   Use a household cleaning spray or wipe, according to the label instructions. Labels contain instructions for safe and effective use of the cleaning product including precautions you should take when applying the product, such as wearing gloves and making sure you have good ventilation during use of the product.   Wash laundry thoroughly.  o Immediately remove and wash clothes or bedding that have blood, stool, or body fluids on them.  o Wear disposable gloves while handling soiled items and keep soiled items away from your body. Clean your hands (with soap and water or an alcohol-based hand ) immediately after removing your gloves.  o Read and follow directions on labels of laundry or clothing items and detergent. In general, using a normal laundry detergent according to washing machine instructions and dry thoroughly using the warmest temperatures recommended on the clothing label.   Place all used disposable gloves, facemasks, and other contaminated items in a lined container before disposing of them with other household waste. Clean your hands (with soap and water or an alcohol-based hand )  immediately after handling these items. Soap and water should be used preferentially if hands are visibly dirty.   Discuss any additional questions with your state or local health department or healthcare provider. Check available hours when contacting your local health department.    For more information see CDC link below.      https://www.cdc.gov/coronavirus/2019-ncov/hcp/guidance-prevent-spread.html#precautions        Sources:  [facility], [place] [facility] of [facility] and [facility] [place]

## 2020-04-02 ENCOUNTER — TELEPHONE (OUTPATIENT)
Dept: INTERNAL MEDICINE | Facility: CLINIC | Age: 40
End: 2020-04-02

## 2020-04-02 ENCOUNTER — PATIENT MESSAGE (OUTPATIENT)
Dept: INTERNAL MEDICINE | Facility: CLINIC | Age: 40
End: 2020-04-02

## 2020-04-02 NOTE — TELEPHONE ENCOUNTER
Pt is doing well. Results given to pt. Asked to quarantine himself for 14 days. Avoid sharing personal items w/ wife and also to wipe down common space often w/ lysol/clorox.     Yesy, it won't let me change his appt at the end of the month to virtual visit. Please change/ask Soraya. Thanks!

## 2020-04-07 ENCOUNTER — PATIENT MESSAGE (OUTPATIENT)
Dept: INTERNAL MEDICINE | Facility: CLINIC | Age: 40
End: 2020-04-07

## 2020-04-07 DIAGNOSIS — R05.9 COUGH: ICD-10-CM

## 2020-04-07 DIAGNOSIS — R50.9 FEVER, UNSPECIFIED FEVER CAUSE: ICD-10-CM

## 2020-04-07 RX ORDER — PROMETHAZINE HYDROCHLORIDE AND CODEINE PHOSPHATE 6.25; 1 MG/5ML; MG/5ML
5 SOLUTION ORAL EVERY 8 HOURS PRN
Qty: 180 ML | Refills: 0 | Status: SHIPPED | OUTPATIENT
Start: 2020-04-07 | End: 2020-04-17

## 2020-04-20 DIAGNOSIS — E78.5 HYPERLIPIDEMIA ASSOCIATED WITH TYPE 2 DIABETES MELLITUS: ICD-10-CM

## 2020-04-20 DIAGNOSIS — E11.69 HYPERLIPIDEMIA ASSOCIATED WITH TYPE 2 DIABETES MELLITUS: ICD-10-CM

## 2020-04-20 RX ORDER — GLIPIZIDE 5 MG/1
5 TABLET, FILM COATED, EXTENDED RELEASE ORAL
Qty: 90 TABLET | Refills: 3 | Status: SHIPPED | OUTPATIENT
Start: 2020-04-20 | End: 2020-09-24 | Stop reason: SDUPTHER

## 2020-05-07 ENCOUNTER — PATIENT MESSAGE (OUTPATIENT)
Dept: INTERNAL MEDICINE | Facility: CLINIC | Age: 40
End: 2020-05-07

## 2020-05-07 RX ORDER — BENZONATATE 100 MG/1
100 CAPSULE ORAL 3 TIMES DAILY PRN
Qty: 30 CAPSULE | Refills: 0 | Status: SHIPPED | OUTPATIENT
Start: 2020-05-07 | End: 2020-05-17

## 2020-05-14 ENCOUNTER — PATIENT OUTREACH (OUTPATIENT)
Dept: ADMINISTRATIVE | Facility: HOSPITAL | Age: 40
End: 2020-05-14

## 2020-08-04 ENCOUNTER — PATIENT MESSAGE (OUTPATIENT)
Dept: INTERNAL MEDICINE | Facility: CLINIC | Age: 40
End: 2020-08-04

## 2020-08-04 ENCOUNTER — TELEPHONE (OUTPATIENT)
Dept: INTERNAL MEDICINE | Facility: CLINIC | Age: 40
End: 2020-08-04

## 2020-08-04 ENCOUNTER — HOSPITAL ENCOUNTER (EMERGENCY)
Facility: HOSPITAL | Age: 40
Discharge: PSYCHIATRIC OR SUBSTANCE USE FACILITY | End: 2020-08-05
Attending: EMERGENCY MEDICINE
Payer: COMMERCIAL

## 2020-08-04 DIAGNOSIS — Z00.8 MEDICAL CLEARANCE FOR PSYCHIATRIC ADMISSION: ICD-10-CM

## 2020-08-04 DIAGNOSIS — F32.A DEPRESSION, UNSPECIFIED DEPRESSION TYPE: Primary | ICD-10-CM

## 2020-08-04 LAB
ALBUMIN SERPL BCP-MCNC: 4.7 G/DL (ref 3.5–5.2)
ALP SERPL-CCNC: 96 U/L (ref 55–135)
ALT SERPL W/O P-5'-P-CCNC: 32 U/L (ref 10–44)
AMPHET+METHAMPHET UR QL: NEGATIVE
ANION GAP SERPL CALC-SCNC: 13 MMOL/L (ref 8–16)
APAP SERPL-MCNC: <3 UG/ML (ref 10–20)
AST SERPL-CCNC: 26 U/L (ref 10–40)
BACTERIA #/AREA URNS HPF: ABNORMAL /HPF
BARBITURATES UR QL SCN>200 NG/ML: NEGATIVE
BASOPHILS # BLD AUTO: 0.02 K/UL (ref 0–0.2)
BASOPHILS NFR BLD: 0.3 % (ref 0–1.9)
BENZODIAZ UR QL SCN>200 NG/ML: NEGATIVE
BILIRUB SERPL-MCNC: 0.7 MG/DL (ref 0.1–1)
BILIRUB UR QL STRIP: ABNORMAL
BUN SERPL-MCNC: 8 MG/DL (ref 6–20)
BZE UR QL SCN: NEGATIVE
CALCIUM SERPL-MCNC: 10.4 MG/DL (ref 8.7–10.5)
CANNABINOIDS UR QL SCN: NEGATIVE
CHLORIDE SERPL-SCNC: 101 MMOL/L (ref 95–110)
CLARITY UR: CLEAR
CO2 SERPL-SCNC: 22 MMOL/L (ref 23–29)
COLOR UR: YELLOW
CREAT SERPL-MCNC: 0.9 MG/DL (ref 0.5–1.4)
CREAT UR-MCNC: 129.7 MG/DL (ref 23–375)
DIFFERENTIAL METHOD: ABNORMAL
EOSINOPHIL # BLD AUTO: 0.2 K/UL (ref 0–0.5)
EOSINOPHIL NFR BLD: 2.1 % (ref 0–8)
ERYTHROCYTE [DISTWIDTH] IN BLOOD BY AUTOMATED COUNT: 12.6 % (ref 11.5–14.5)
EST. GFR  (AFRICAN AMERICAN): >60 ML/MIN/1.73 M^2
EST. GFR  (NON AFRICAN AMERICAN): >60 ML/MIN/1.73 M^2
ETHANOL SERPL-MCNC: <10 MG/DL
GLUCOSE SERPL-MCNC: 236 MG/DL (ref 70–110)
GLUCOSE UR QL STRIP: NEGATIVE
GRAN CASTS #/AREA URNS LPF: 2 /LPF
HCT VFR BLD AUTO: 42.3 % (ref 40–54)
HGB BLD-MCNC: 14.8 G/DL (ref 14–18)
HGB UR QL STRIP: NEGATIVE
HYALINE CASTS #/AREA URNS LPF: 3 /LPF
IMM GRANULOCYTES # BLD AUTO: 0.02 K/UL (ref 0–0.04)
IMM GRANULOCYTES NFR BLD AUTO: 0.3 % (ref 0–0.5)
KETONES UR QL STRIP: NEGATIVE
LEUKOCYTE ESTERASE UR QL STRIP: NEGATIVE
LYMPHOCYTES # BLD AUTO: 3.1 K/UL (ref 1–4.8)
LYMPHOCYTES NFR BLD: 42.2 % (ref 18–48)
MCH RBC QN AUTO: 31.6 PG (ref 27–31)
MCHC RBC AUTO-ENTMCNC: 35 G/DL (ref 32–36)
MCV RBC AUTO: 90 FL (ref 82–98)
METHADONE UR QL SCN>300 NG/ML: NEGATIVE
MICROSCOPIC COMMENT: ABNORMAL
MONOCYTES # BLD AUTO: 0.6 K/UL (ref 0.3–1)
MONOCYTES NFR BLD: 7.8 % (ref 4–15)
NEUTROPHILS # BLD AUTO: 3.4 K/UL (ref 1.8–7.7)
NEUTROPHILS NFR BLD: 47.3 % (ref 38–73)
NITRITE UR QL STRIP: NEGATIVE
NRBC BLD-RTO: 0 /100 WBC
OPIATES UR QL SCN: NEGATIVE
PCP UR QL SCN>25 NG/ML: NEGATIVE
PH UR STRIP: 6 [PH] (ref 5–8)
PLATELET # BLD AUTO: 328 K/UL (ref 150–350)
PMV BLD AUTO: 9.6 FL (ref 9.2–12.9)
POTASSIUM SERPL-SCNC: 3.5 MMOL/L (ref 3.5–5.1)
PROT SERPL-MCNC: 8.5 G/DL (ref 6–8.4)
PROT UR QL STRIP: ABNORMAL
RBC # BLD AUTO: 4.68 M/UL (ref 4.6–6.2)
RBC #/AREA URNS HPF: 0 /HPF (ref 0–4)
SARS-COV-2 RDRP RESP QL NAA+PROBE: NEGATIVE
SODIUM SERPL-SCNC: 136 MMOL/L (ref 136–145)
SP GR UR STRIP: >=1.03 (ref 1–1.03)
TOXICOLOGY INFORMATION: NORMAL
TSH SERPL DL<=0.005 MIU/L-ACNC: 1.05 UIU/ML (ref 0.4–4)
URN SPEC COLLECT METH UR: ABNORMAL
UROBILINOGEN UR STRIP-ACNC: NEGATIVE EU/DL
WBC # BLD AUTO: 7.27 K/UL (ref 3.9–12.7)
WBC #/AREA URNS HPF: 3 /HPF (ref 0–5)
YEAST URNS QL MICRO: ABNORMAL

## 2020-08-04 PROCEDURE — 85025 COMPLETE CBC W/AUTO DIFF WBC: CPT

## 2020-08-04 PROCEDURE — 80307 DRUG TEST PRSMV CHEM ANLYZR: CPT

## 2020-08-04 PROCEDURE — 93005 ELECTROCARDIOGRAM TRACING: CPT

## 2020-08-04 PROCEDURE — 81000 URINALYSIS NONAUTO W/SCOPE: CPT | Mod: 59

## 2020-08-04 PROCEDURE — 84443 ASSAY THYROID STIM HORMONE: CPT

## 2020-08-04 PROCEDURE — U0002 COVID-19 LAB TEST NON-CDC: HCPCS

## 2020-08-04 PROCEDURE — 80320 DRUG SCREEN QUANTALCOHOLS: CPT

## 2020-08-04 PROCEDURE — 80329 ANALGESICS NON-OPIOID 1 OR 2: CPT

## 2020-08-04 PROCEDURE — 99285 EMERGENCY DEPT VISIT HI MDM: CPT | Mod: 25

## 2020-08-04 PROCEDURE — 80053 COMPREHEN METABOLIC PANEL: CPT

## 2020-08-04 RX ORDER — LORAZEPAM 1 MG/1
1 TABLET ORAL
Status: DISCONTINUED | OUTPATIENT
Start: 2020-08-04 | End: 2020-08-05 | Stop reason: HOSPADM

## 2020-08-04 NOTE — TELEPHONE ENCOUNTER
Yesy, please get this to Chelsi to eval but recommend to go to ED. Let me know if you can't get in touch w/ her in the next hour or two.

## 2020-08-04 NOTE — TELEPHONE ENCOUNTER
I spoke Mr Jain.  I noticed he had not checked in yet at the hospital.  He's waiting for his brother to get off from work, thanks Dr Arias.

## 2020-08-04 NOTE — TELEPHONE ENCOUNTER
Chelsi can you triage Mr. Jain. He sent a message that he's severely depressed. I think he may have to go to ED.

## 2020-08-04 NOTE — TELEPHONE ENCOUNTER
I called and spoke to the patient he is very stressed and very depressed, its been going on for a week or 2, lost a family member, lost his job, he and his wife split, he had Covid-19, its an accumulation of what is going on. For most of his life he has been strong, getting 2 hours of sleep, he is on antidepressant Lexapro, that's not working at all. I took 4-5 in one day and it's not working, i'm being honest. I'm reaching out for help. He tried to take walks to decrease the stress. I'm at the point of giving up. He has thought about harming himself Friday when he and his wife split up. He doesn't have a plan. He just thought about that he was tired. He can get someone to bring. BP has been normal. He doesn't feel like it has been up. Glucose is 286 this morning. He Is not missing any doses. He is eating the wrong things and snacking a lot. Will call José. I asked him to call his brother now, he is willing to go to the ER.  He was asked to call and notify me when he is on the way to the ER.  Thanks Dr. Ryan BOYD, he has been taking at least 3 lexapro a day since Friday, trying to get rid of the depression. I explained that it may not be the formula that works for him.  Always call and notify us when something is not working, don't just take for or 5 and make his liver angry, so to speak.  His glucose has been up to 400 as up last week. He doesn't know what his BP is, I advised a BP cuff for the house on another day when he returns home so we know when he is in distress in the future.

## 2020-08-05 VITALS
OXYGEN SATURATION: 98 % | HEIGHT: 69 IN | HEART RATE: 83 BPM | WEIGHT: 315 LBS | DIASTOLIC BLOOD PRESSURE: 89 MMHG | SYSTOLIC BLOOD PRESSURE: 172 MMHG | BODY MASS INDEX: 46.65 KG/M2 | RESPIRATION RATE: 20 BRPM | TEMPERATURE: 98 F

## 2020-08-05 PROBLEM — R45.851 DEPRESSION WITH SUICIDAL IDEATION: Status: ACTIVE | Noted: 2020-08-05

## 2020-08-05 PROBLEM — F32.A DEPRESSION WITH SUICIDAL IDEATION: Status: ACTIVE | Noted: 2020-08-05

## 2020-08-05 NOTE — ED NOTES
Patient presents to ED secondary to SI. States has increased stressors in life and is prescribed lexapro for the past year. States lexapro isn't helping him anymore. They are prescribed by Dr Arias. Patient denies having a psychiatrist. Increased stressors include losing job,  from wife, nephew's death and had covid in March. Denies HI or hallucinations. States plan to kill self would be to overdose on pills.  Also endorses taking 5 lexapro today. Pt denies cp, sob, URI symptoms, fever, chills, abd pain, n/v/d, urinary symptoms, and rash of any kind. Patient is calm and cooperative and agreeable to plan of care. Security notified of need to be wanded.

## 2020-08-05 NOTE — ED NOTES
Patient states uses a cpap machine at night. Also endorses being diabetic and hypertensive but states has been compliant with all medications. Does state blood sugar has been increased recently secondary to stress.
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
## 2020-08-05 NOTE — ED NOTES
APPEARANCE: Alert, oriented and in no acute distress.  CARDIAC: Normal rate. Hx of htn.  PERIPHERAL VASCULAR: peripheral pulses present. Normal cap refill. No edema. Warm to touch.    RESPIRATORY:Normal rate and effort, breath sounds clear bilaterally throughout chest. Respirations are equal and unlabored no obvious signs of distress. States needs cpap to sleep at night. Hx of sleep apnea.  GASTRO: soft, bowel sounds normal, no tenderness, no abdominal distention. Obese.  MUSC: Full ROM. No bony tenderness or soft tissue tenderness. No obvious deformity.  SKIN: Skin is warm and dry, normal skin turgor, mucous membranes moist.  NEURO: 5/5 strength major flexors/extensors bilaterally. Sensory intact to light touch bilaterally. Coram coma scale: eyes open spontaneously-4, oriented & converses-5, obeys commands-6. No neurological abnormalities.
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
## 2020-08-05 NOTE — ED NOTES
Patient wanded by security. Patient placed in gown. One bag of belongings placed in belongings closet. [facility] ED tech, at bedside for 1:1 observation.

## 2020-08-10 ENCOUNTER — TELEPHONE (OUTPATIENT)
Dept: BARIATRICS | Facility: CLINIC | Age: 40
End: 2020-08-10

## 2020-08-10 NOTE — TELEPHONE ENCOUNTER
Phoned patient in reference to a patient portal message.  After reviewing the chart, there was an encounter where the patient sought medical attention in an ED for suicidal ideation.  Reviewed the encounter note with the patient and he agreed to all details, informed him that he would have to be placed on hold for 1 year.  He statedthat the life issues that were causing him to have these feelings were no longer present in his like right now, but her respects our policy.  Encouraged him to reach back out in 1 year.

## 2020-09-18 ENCOUNTER — TELEPHONE (OUTPATIENT)
Dept: INTERNAL MEDICINE | Facility: CLINIC | Age: 40
End: 2020-09-18

## 2020-09-24 DIAGNOSIS — E78.5 HYPERLIPIDEMIA ASSOCIATED WITH TYPE 2 DIABETES MELLITUS: ICD-10-CM

## 2020-09-24 DIAGNOSIS — B00.52 HERPES KERATITIS: ICD-10-CM

## 2020-09-24 DIAGNOSIS — E11.69 DIABETES MELLITUS TYPE 2 IN OBESE: ICD-10-CM

## 2020-09-24 DIAGNOSIS — E11.69 HYPERLIPIDEMIA ASSOCIATED WITH TYPE 2 DIABETES MELLITUS: ICD-10-CM

## 2020-09-24 DIAGNOSIS — E66.9 DIABETES MELLITUS TYPE 2 IN OBESE: ICD-10-CM

## 2020-09-25 ENCOUNTER — PATIENT MESSAGE (OUTPATIENT)
Dept: INTERNAL MEDICINE | Facility: CLINIC | Age: 40
End: 2020-09-25

## 2020-09-25 DIAGNOSIS — E11.69 HYPERLIPIDEMIA ASSOCIATED WITH TYPE 2 DIABETES MELLITUS: ICD-10-CM

## 2020-09-25 DIAGNOSIS — I15.2 HYPERTENSION ASSOCIATED WITH DIABETES: ICD-10-CM

## 2020-09-25 DIAGNOSIS — E78.5 HYPERLIPIDEMIA ASSOCIATED WITH TYPE 2 DIABETES MELLITUS: ICD-10-CM

## 2020-09-25 DIAGNOSIS — E11.59 HYPERTENSION ASSOCIATED WITH DIABETES: ICD-10-CM

## 2020-09-25 DIAGNOSIS — E11.69 DIABETES MELLITUS TYPE 2 IN OBESE: ICD-10-CM

## 2020-09-25 DIAGNOSIS — E66.9 DIABETES MELLITUS TYPE 2 IN OBESE: ICD-10-CM

## 2020-09-25 DIAGNOSIS — B00.52 HERPES KERATITIS: ICD-10-CM

## 2020-09-25 RX ORDER — GLIPIZIDE 5 MG/1
5 TABLET, FILM COATED, EXTENDED RELEASE ORAL
Qty: 90 TABLET | Refills: 3 | Status: SHIPPED | OUTPATIENT
Start: 2020-09-25 | End: 2020-12-05 | Stop reason: SDUPTHER

## 2020-09-25 RX ORDER — ROSUVASTATIN CALCIUM 40 MG/1
40 TABLET, COATED ORAL NIGHTLY
Qty: 90 TABLET | Refills: 3 | Status: SHIPPED | OUTPATIENT
Start: 2020-09-25 | End: 2020-12-05 | Stop reason: SDUPTHER

## 2020-09-25 RX ORDER — GLIPIZIDE 5 MG/1
5 TABLET, FILM COATED, EXTENDED RELEASE ORAL
Qty: 90 TABLET | Refills: 3 | Status: SHIPPED | OUTPATIENT
Start: 2020-09-25 | End: 2021-04-09

## 2020-09-25 RX ORDER — ACYCLOVIR 400 MG/1
400 TABLET ORAL 2 TIMES DAILY
Qty: 60 TABLET | Refills: 6 | Status: SHIPPED | OUTPATIENT
Start: 2020-09-25 | End: 2021-09-25

## 2020-09-25 RX ORDER — METFORMIN HYDROCHLORIDE EXTENDED-RELEASE TABLETS 500 MG/1
1000 TABLET, FILM COATED, EXTENDED RELEASE ORAL 2 TIMES DAILY WITH MEALS
Qty: 360 TABLET | Refills: 3 | Status: SHIPPED | OUTPATIENT
Start: 2020-09-25 | End: 2020-12-05 | Stop reason: SDUPTHER

## 2020-09-25 RX ORDER — ACYCLOVIR 400 MG/1
400 TABLET ORAL 2 TIMES DAILY
Qty: 60 TABLET | Refills: 6 | Status: SHIPPED | OUTPATIENT
Start: 2020-09-25 | End: 2020-12-07 | Stop reason: SDUPTHER

## 2020-09-25 RX ORDER — VALSARTAN 320 MG/1
320 TABLET ORAL DAILY
Qty: 90 TABLET | Refills: 3 | Status: SHIPPED | OUTPATIENT
Start: 2020-09-25 | End: 2020-10-13 | Stop reason: SDUPTHER

## 2020-09-25 RX ORDER — HYDROCHLOROTHIAZIDE 25 MG/1
25 TABLET ORAL DAILY
Qty: 90 TABLET | Refills: 3 | Status: SHIPPED | OUTPATIENT
Start: 2020-09-25 | End: 2020-12-05 | Stop reason: SDUPTHER

## 2020-09-25 RX ORDER — METFORMIN HYDROCHLORIDE EXTENDED-RELEASE TABLETS 500 MG/1
1000 TABLET, FILM COATED, EXTENDED RELEASE ORAL 2 TIMES DAILY WITH MEALS
Qty: 360 TABLET | Refills: 3 | Status: SHIPPED | OUTPATIENT
Start: 2020-09-25 | End: 2020-10-14 | Stop reason: SDUPTHER

## 2020-09-28 ENCOUNTER — TELEPHONE (OUTPATIENT)
Dept: INTERNAL MEDICINE | Facility: CLINIC | Age: 40
End: 2020-09-28

## 2020-10-05 ENCOUNTER — PATIENT MESSAGE (OUTPATIENT)
Dept: ADMINISTRATIVE | Facility: HOSPITAL | Age: 40
End: 2020-10-05

## 2020-10-14 ENCOUNTER — IMMUNIZATION (OUTPATIENT)
Dept: INTERNAL MEDICINE | Facility: CLINIC | Age: 40
End: 2020-10-14
Payer: COMMERCIAL

## 2020-10-14 ENCOUNTER — HOSPITAL ENCOUNTER (OUTPATIENT)
Dept: RADIOLOGY | Facility: HOSPITAL | Age: 40
Discharge: HOME | End: 2020-10-14
Attending: FAMILY MEDICINE
Payer: COMMERCIAL

## 2020-10-14 ENCOUNTER — OFFICE VISIT (OUTPATIENT)
Dept: INTERNAL MEDICINE | Facility: CLINIC | Age: 40
End: 2020-10-14
Attending: FAMILY MEDICINE
Payer: COMMERCIAL

## 2020-10-14 ENCOUNTER — PATIENT MESSAGE (OUTPATIENT)
Dept: INTERNAL MEDICINE | Facility: CLINIC | Age: 40
End: 2020-10-14

## 2020-10-14 VITALS
HEART RATE: 84 BPM | SYSTOLIC BLOOD PRESSURE: 128 MMHG | OXYGEN SATURATION: 96 % | BODY MASS INDEX: 46.65 KG/M2 | WEIGHT: 315 LBS | DIASTOLIC BLOOD PRESSURE: 82 MMHG | HEIGHT: 69 IN

## 2020-10-14 DIAGNOSIS — M79.644 PAIN OF RIGHT THUMB: Primary | ICD-10-CM

## 2020-10-14 DIAGNOSIS — F33.2 SEVERE EPISODE OF RECURRENT MAJOR DEPRESSIVE DISORDER, WITHOUT PSYCHOTIC FEATURES: ICD-10-CM

## 2020-10-14 DIAGNOSIS — I15.2 HYPERTENSION ASSOCIATED WITH DIABETES: ICD-10-CM

## 2020-10-14 DIAGNOSIS — E11.69 TYPE 2 DIABETES MELLITUS WITH OTHER SPECIFIED COMPLICATION, WITHOUT LONG-TERM CURRENT USE OF INSULIN: ICD-10-CM

## 2020-10-14 DIAGNOSIS — Z12.5 PROSTATE CANCER SCREENING: ICD-10-CM

## 2020-10-14 DIAGNOSIS — E11.59 HYPERTENSION ASSOCIATED WITH DIABETES: ICD-10-CM

## 2020-10-14 DIAGNOSIS — Z00.00 ANNUAL PHYSICAL EXAM: ICD-10-CM

## 2020-10-14 DIAGNOSIS — M79.644 PAIN OF RIGHT THUMB: ICD-10-CM

## 2020-10-14 PROBLEM — E11.9 TYPE 2 DIABETES MELLITUS, WITHOUT LONG-TERM CURRENT USE OF INSULIN: Status: ACTIVE | Noted: 2020-10-14

## 2020-10-14 PROCEDURE — 90686 FLU VACCINE (QUAD) GREATER THAN OR EQUAL TO 3YO PRESERVATIVE FREE IM: ICD-10-PCS | Mod: S$GLB,,, | Performed by: INTERNAL MEDICINE

## 2020-10-14 PROCEDURE — 99214 OFFICE O/P EST MOD 30 MIN: CPT | Mod: S$GLB,,, | Performed by: FAMILY MEDICINE

## 2020-10-14 PROCEDURE — 99999 PR PBB SHADOW E&M-EST. PATIENT-LVL V: ICD-10-PCS | Mod: PBBFAC,,, | Performed by: FAMILY MEDICINE

## 2020-10-14 PROCEDURE — 3074F PR MOST RECENT SYSTOLIC BLOOD PRESSURE < 130 MM HG: ICD-10-PCS | Mod: CPTII,S$GLB,, | Performed by: FAMILY MEDICINE

## 2020-10-14 PROCEDURE — 99999 PR PBB SHADOW E&M-EST. PATIENT-LVL V: CPT | Mod: PBBFAC,,, | Performed by: FAMILY MEDICINE

## 2020-10-14 PROCEDURE — 90471 FLU VACCINE (QUAD) GREATER THAN OR EQUAL TO 3YO PRESERVATIVE FREE IM: ICD-10-PCS | Mod: S$GLB,,, | Performed by: INTERNAL MEDICINE

## 2020-10-14 PROCEDURE — 73140 XR FINGER 2 OR MORE VIEWS: ICD-10-PCS | Mod: 26,,, | Performed by: RADIOLOGY

## 2020-10-14 PROCEDURE — 90471 IMMUNIZATION ADMIN: CPT | Mod: S$GLB,,, | Performed by: INTERNAL MEDICINE

## 2020-10-14 PROCEDURE — 3074F SYST BP LT 130 MM HG: CPT | Mod: CPTII,S$GLB,, | Performed by: FAMILY MEDICINE

## 2020-10-14 PROCEDURE — 3079F PR MOST RECENT DIASTOLIC BLOOD PRESSURE 80-89 MM HG: ICD-10-PCS | Mod: CPTII,S$GLB,, | Performed by: FAMILY MEDICINE

## 2020-10-14 PROCEDURE — 3008F BODY MASS INDEX DOCD: CPT | Mod: CPTII,S$GLB,, | Performed by: FAMILY MEDICINE

## 2020-10-14 PROCEDURE — 99214 PR OFFICE/OUTPT VISIT, EST, LEVL IV, 30-39 MIN: ICD-10-PCS | Mod: S$GLB,,, | Performed by: FAMILY MEDICINE

## 2020-10-14 PROCEDURE — 73140 X-RAY EXAM OF FINGER(S): CPT | Mod: TC

## 2020-10-14 PROCEDURE — 3052F PR MOST RECENT HEMOGLOBIN A1C LEVEL 8.0 - < 9.0%: ICD-10-PCS | Mod: CPTII,S$GLB,, | Performed by: FAMILY MEDICINE

## 2020-10-14 PROCEDURE — 3008F PR BODY MASS INDEX (BMI) DOCUMENTED: ICD-10-PCS | Mod: CPTII,S$GLB,, | Performed by: FAMILY MEDICINE

## 2020-10-14 PROCEDURE — 90686 IIV4 VACC NO PRSV 0.5 ML IM: CPT | Mod: S$GLB,,, | Performed by: INTERNAL MEDICINE

## 2020-10-14 PROCEDURE — 3052F HG A1C>EQUAL 8.0%<EQUAL 9.0%: CPT | Mod: CPTII,S$GLB,, | Performed by: FAMILY MEDICINE

## 2020-10-14 PROCEDURE — 73140 X-RAY EXAM OF FINGER(S): CPT | Mod: 26,,, | Performed by: RADIOLOGY

## 2020-10-14 PROCEDURE — 3079F DIAST BP 80-89 MM HG: CPT | Mod: CPTII,S$GLB,, | Performed by: FAMILY MEDICINE

## 2020-10-14 RX ORDER — VALSARTAN 320 MG/1
320 TABLET ORAL DAILY
Qty: 90 TABLET | Refills: 0 | Status: SHIPPED | OUTPATIENT
Start: 2020-10-14 | End: 2020-12-05 | Stop reason: SDUPTHER

## 2020-10-14 RX ORDER — HYDROCODONE BITARTRATE AND ACETAMINOPHEN 5; 325 MG/1; MG/1
1 TABLET ORAL 3 TIMES DAILY PRN
Qty: 21 TABLET | Refills: 0 | Status: SHIPPED | OUTPATIENT
Start: 2020-10-14 | End: 2020-10-21

## 2020-10-14 RX ORDER — VENLAFAXINE HYDROCHLORIDE 75 MG/1
75 CAPSULE, EXTENDED RELEASE ORAL DAILY
Qty: 90 CAPSULE | Refills: 0 | Status: SHIPPED | OUTPATIENT
Start: 2020-10-14 | End: 2020-10-19 | Stop reason: SDUPTHER

## 2020-10-14 RX ORDER — COLCHICINE 0.6 MG/1
0.6 TABLET ORAL 2 TIMES DAILY PRN
Qty: 30 TABLET | Refills: 0 | Status: SHIPPED | OUTPATIENT
Start: 2020-10-14 | End: 2020-12-05 | Stop reason: SDUPTHER

## 2020-10-14 RX ORDER — QUETIAPINE FUMARATE 50 MG/1
50 TABLET, FILM COATED ORAL NIGHTLY PRN
Qty: 90 TABLET | Refills: 0 | Status: SHIPPED | OUTPATIENT
Start: 2020-10-14 | End: 2020-12-07 | Stop reason: SDUPTHER

## 2020-10-14 NOTE — PROGRESS NOTES
Chief Complaint  Chief Complaint   Patient presents with    Hand Pain       HPI  Nain Jain is a 39 y.o. male with history of DM, HLD, HTN, sleep apnea, anxiety and depression that presents for R thumb pain.  Their last appointment with primary care was Visit date not found.      Patient describes a sudden onset episode of right thumb pain in the PIP joint. The pain is 8/10, constant, and non radiating. Patient denies numbness, tingling, weakness, or progression of the pain. He has not had any recent trauma to the thumb, but two months ago he did slam his thumb in a door. He also reports hurting the R wrist during lifting. Mr. Jain denies previous episodes, allergies, and has not taken any medications for the thumb. He has not had any recent seafood or beer intake. He would like to get the flu shot and get a refill of his Venlafaxine and Seroquel today.       PAST MEDICAL HISTORY:  Past Medical History:   Diagnosis Date    BMI 50.0-59.9, adult     Diabetes mellitus     Hyperlipidemia     Hypertension     Morbid obesity     Sleep apnea        PAST SURGICAL HISTORY:  Past Surgical History:   Procedure Laterality Date    EXCISION OF MASS OF ABDOMEN N/A 4/22/2019    Procedure: EXCISION, MASS, ABDOMEN;  Surgeon: Pj Saucedo MD;  Location: [facility] OR;  Service: General;  Laterality: N/A;    NO PAST SURGERIES         SOCIAL HISTORY:  Social History     Socioeconomic History    Marital status:      Spouse name: Not on file    Number of children: Not on file    Years of education: Not on file    Highest education level: Not on file   Occupational History    Occupation:    Social Needs    Financial resource strain: Not on file    Food insecurity     Worry: Not on file     Inability: Not on file    Transportation needs     Medical: Not on file     Non-medical: Not on file   Tobacco Use    Smoking status: Never Smoker    Smokeless tobacco: Never Used   Substance and  Sexual Activity    Alcohol use: Yes     Comment: 2 bottles of wine a week - only drinks when he's off.    Drug use: No    Sexual activity: Not on file   Lifestyle    Physical activity     Days per week: Not on file     Minutes per session: Not on file    Stress: Not on file   Relationships    Social connections     Talks on phone: Not on file     Gets together: Not on file     Attends Alevism service: Not on file     Active member of club or organization: Not on file     Attends meetings of clubs or organizations: Not on file     Relationship status: Not on file   Other Topics Concern    Patient feels they ought to cut down on drinking/drug use No    Patient annoyed by others criticizing their drinking/drug use No    Patient has felt bad or guilty about drinking/drug use No    Patient has had a drink/used drugs as an eye opener in the AM No   Social History Narrative    Lives w/wife and 1 child.         FAMILY HISTORY:  Family History   Problem Relation Age of Onset    Diabetes Mother     Obesity Mother     Hypertension Mother     Diabetes Maternal Grandmother     COPD Father         smoker    No Known Problems Sister     No Known Problems Brother     No Known Problems Brother     No Known Problems Sister     No Known Problems Sister        ALLERGIES AND MEDICATIONS: updated and reviewed.  Review of patient's allergies indicates:  No Known Allergies  Current Outpatient Medications   Medication Sig Dispense Refill    acyclovir (ZOVIRAX) 400 MG tablet Take 1 tablet (400 mg total) by mouth 2 (two) times daily. 60 tablet 6    acyclovir (ZOVIRAX) 400 MG tablet Take 1 tablet (400 mg total) by mouth 2 (two) times daily. 60 tablet 6    glipiZIDE (GLUCOTROL) 5 MG TR24 Take 1 tablet (5 mg total) by mouth daily with breakfast. 90 tablet 3    glipiZIDE (GLUCOTROL) 5 MG TR24 Take 1 tablet (5 mg total) by mouth daily with breakfast. 90 tablet 3    hydroCHLOROthiazide (HYDRODIURIL) 25 MG tablet Take 1  "tablet (25 mg total) by mouth once daily. 90 tablet 3    metFORMIN (FORTAMET) 500 mg 24hr tablet Take 2 tablets (1,000 mg total) by mouth 2 (two) times daily with meals. 360 tablet 3    rosuvastatin (CRESTOR) 40 MG Tab Take 1 tablet (40 mg total) by mouth every evening. 90 tablet 3    valsartan (DIOVAN) 320 MG tablet Take 1 tablet (320 mg total) by mouth once daily. 90 tablet 0    colchicine (COLCRYS) 0.6 mg tablet Take 1 tablet (0.6 mg total) by mouth 2 (two) times daily as needed (gout). Change to cap if needed for insurance coverage 30 tablet 0    HYDROcodone-acetaminophen (NORCO) 5-325 mg per tablet Take 1 tablet by mouth 3 (three) times daily as needed for Pain. 21 tablet 0    QUEtiapine (SEROQUEL) 50 MG tablet Take 1 tablet (50 mg total) by mouth nightly as needed (insonmia). 90 tablet 0    venlafaxine (EFFEXOR-XR) 75 MG 24 hr capsule Take 1 capsule (75 mg total) by mouth once daily. 90 capsule 0     No current facility-administered medications for this visit.          ROS  Review of Systems   Constitutional: Negative for chills, fatigue and fever.   HENT: Negative for rhinorrhea and sneezing.    Eyes: Negative for visual disturbance.   Respiratory: Negative for cough and wheezing.    Cardiovascular: Negative for chest pain.   Gastrointestinal: Negative for abdominal distention, anal bleeding, constipation and diarrhea.   Genitourinary: Negative for dysuria and urgency.   Musculoskeletal: Positive for arthralgias and joint swelling.   Skin: Positive for rash.   Neurological: Negative for dizziness, weakness, numbness and headaches.           Physical Exam  Vitals:    10/14/20 0820   BP: 128/82   BP Location: Left arm   Patient Position: Sitting   BP Method: Large (Manual)   Pulse: 84   SpO2: 96%   Weight: (!) 152.2 kg (335 lb 8.6 oz)   Height: 5' 9" (1.753 m)    Body mass index is 49.55 kg/m².  Weight: (!) 152.2 kg (335 lb 8.6 oz)   Height: 5' 9" (175.3 cm)   Physical Exam  Constitutional:       " Appearance: He is obese.   Cardiovascular:      Rate and Rhythm: Normal rate and regular rhythm.      Heart sounds: No murmur. No friction rub. No gallop.    Pulmonary:      Effort: Pulmonary effort is normal.      Breath sounds: No wheezing.   Chest:      Chest wall: No tenderness.   Musculoskeletal:      Left hand: He exhibits decreased range of motion, tenderness and swelling. He exhibits no deformity and no laceration. Normal sensation noted. Normal strength noted.        Hands:       Comments: Negative thalen's test and tinel's test   Neurological:      Mental Status: He is alert.           Health Maintenance       Date Due Completion Date    Hemoglobin A1c 07/01/2020 4/1/2020    Influenza Vaccine (1) 08/01/2020 9/20/2019    Foot Exam 09/20/2020 9/20/2019    Override on 1/31/2018: Done    Urine Microalbumin 09/26/2020 9/26/2019    Eye Exam 02/26/2021 2/26/2020    Lipid Panel 04/01/2021 4/1/2020    Low Dose Statin 10/14/2021 10/14/2020    TETANUS VACCINE 09/24/2024 9/24/2014 (Done)    Override on 9/24/2014: Done            Assessment and Plan:  Pain of right thumb  -     C-Reactive Protein; Future; Expected date: 10/14/2020  -     Sedimentation rate; Future; Expected date: 10/14/2020  -     Cyclic Citrullinated Peptide Antibody, IgG; Future; Expected date: 10/14/2020  -     Rheumatoid Factor; Future; Expected date: 10/14/2020  -     ASHOK Screen w/Reflex; Future; Expected date: 10/14/2020  -     CK; Future; Expected date: 10/14/2020  -     Uric Acid; Future; Expected date: 10/14/2020  -     X-Ray Finger 2 or More Views; Future; Expected date: 10/14/2020  -     colchicine (COLCRYS) 0.6 mg tablet; Take 1 tablet (0.6 mg total) by mouth 2 (two) times daily as needed (gout). Change to cap if needed for insurance coverage  Dispense: 30 tablet; Refill: 0  -     HYDROcodone-acetaminophen (NORCO) 5-325 mg per tablet; Take 1 tablet by mouth 3 (three) times daily as needed for Pain.  Dispense: 21 tablet; Refill: 0  -     CBC  auto differential; Future; Expected date: 10/14/2020    Severe episode of recurrent major depressive disorder, without psychotic features  -     QUEtiapine (SEROQUEL) 50 MG tablet; Take 1 tablet (50 mg total) by mouth nightly as needed (insonmia).  Dispense: 90 tablet; Refill: 0  -     venlafaxine (EFFEXOR-XR) 75 MG 24 hr capsule; Take 1 capsule (75 mg total) by mouth once daily.  Dispense: 90 capsule; Refill: 0    Hypertension associated with diabetes  -     valsartan (DIOVAN) 320 MG tablet; Take 1 tablet (320 mg total) by mouth once daily.  Dispense: 90 tablet; Refill: 0    Type 2 diabetes mellitus with other specified complication, without long-term current use of insulin  -     Hemoglobin A1C; Future; Expected date: 10/14/2020  -     Comprehensive Metabolic Panel; Future; Expected date: 10/14/2020  -     Microalbumin/Creatinine Ratio, Urine; Future; Expected date: 10/14/2020    Hypertension associated with diabetes  Comments:  Controlled, continue current regimen  Orders:  -     valsartan (DIOVAN) 320 MG tablet; Take 1 tablet (320 mg total) by mouth once daily.  Dispense: 90 tablet; Refill: 0    Prostate cancer screening  -     PSA, Screening; Future; Expected date: 10/14/2020    Annual physical exam  -     Hemoglobin A1C; Future; Expected date: 10/14/2020  -     Comprehensive Metabolic Panel; Future; Expected date: 10/14/2020  -     Lipid Panel; Future; Expected date: 10/14/2020  -     PSA, Screening; Future; Expected date: 10/14/2020  -     Microalbumin/Creatinine Ratio, Urine; Future; Expected date: 10/14/2020

## 2020-10-14 NOTE — PROGRESS NOTES
Subjective:       Patient ID: Nain Jain is a 39 y.o. male.    Chief Complaint: Hand Pain    New patient.  Injury to right thumb 2 months ago with a garage door.  Symptoms seem to have improved completely.  Additional 1 day history of swelling and pain to the right thumb.  No numbness or tingling.  No fever or chills.  No injury.  No stated finger nail disorder, abrasion or other break.  The 2nd through 5th digits palm and dorsal are not affected.  No recurrent injuries reported.  No prior history of gout or other joint problems reported.    Patient stated he had an appointment tomorrow for labs and a follow-up appointment.  His PCP is out and I do see where 1 of the assistance mention an October 15th appointment.  But there is nothing scheduled.    Review of Systems   Constitutional: Negative for chills, fatigue, fever and unexpected weight change.   HENT: Negative for congestion and trouble swallowing.    Eyes: Negative for redness and visual disturbance.   Respiratory: Negative for cough, chest tightness and shortness of breath.    Cardiovascular: Negative for chest pain, palpitations and leg swelling.   Gastrointestinal: Negative for abdominal pain and blood in stool.   Genitourinary: Negative for difficulty urinating and hematuria.   Musculoskeletal: Positive for arthralgias and joint swelling. Negative for back pain, gait problem, myalgias and neck pain.   Skin: Negative for color change and rash.   Neurological: Negative for tremors, speech difficulty, weakness, numbness and headaches.   Hematological: Negative for adenopathy. Does not bruise/bleed easily.   Psychiatric/Behavioral: Negative for behavioral problems, confusion and sleep disturbance. The patient is not nervous/anxious.        Objective:      Physical Exam  Vitals signs and nursing note reviewed.   Constitutional:       General: He is not in acute distress.     Appearance: He is well-developed.   Neck:      Musculoskeletal: Neck  supple.   Pulmonary:      Effort: Pulmonary effort is normal.   Musculoskeletal:      Right hand: He exhibits tenderness and swelling. He exhibits normal capillary refill. Normal sensation noted. Normal strength noted.        Hands:       Right lower leg: No edema.      Left lower leg: No edema.   Skin:     General: Skin is warm and dry.      Findings: No rash.   Neurological:      Mental Status: He is alert and oriented to person, place, and time.   Psychiatric:         Behavior: Behavior normal.         Thought Content: Thought content normal.         Judgment: Judgment normal.         Assessment:       1. Pain of right thumb    2. Severe episode of recurrent major depressive disorder, without psychotic features    3. Hypertension associated with diabetes    4. Type 2 diabetes mellitus with other specified complication, without long-term current use of insulin    5. Hypertension associated with diabetes    6. Prostate cancer screening    7. Annual physical exam        Plan:     Medication List with Changes/Refills   New Medications    COLCHICINE (COLCRYS) 0.6 MG TABLET    Take 1 tablet (0.6 mg total) by mouth 2 (two) times daily as needed (gout). Change to cap if needed for insurance coverage    HYDROCODONE-ACETAMINOPHEN (NORCO) 5-325 MG PER TABLET    Take 1 tablet by mouth 3 (three) times daily as needed for Pain.   Current Medications    ACYCLOVIR (ZOVIRAX) 400 MG TABLET    Take 1 tablet (400 mg total) by mouth 2 (two) times daily.    ACYCLOVIR (ZOVIRAX) 400 MG TABLET    Take 1 tablet (400 mg total) by mouth 2 (two) times daily.    GLIPIZIDE (GLUCOTROL) 5 MG TR24    Take 1 tablet (5 mg total) by mouth daily with breakfast.    GLIPIZIDE (GLUCOTROL) 5 MG TR24    Take 1 tablet (5 mg total) by mouth daily with breakfast.    HYDROCHLOROTHIAZIDE (HYDRODIURIL) 25 MG TABLET    Take 1 tablet (25 mg total) by mouth once daily.    METFORMIN (FORTAMET) 500 MG 24HR TABLET    Take 2 tablets (1,000 mg total) by mouth 2 (two)  times daily with meals.    ROSUVASTATIN (CRESTOR) 40 MG TAB    Take 1 tablet (40 mg total) by mouth every evening.   Changed and/or Refilled Medications    Modified Medication Previous Medication    QUETIAPINE (SEROQUEL) 50 MG TABLET QUEtiapine (SEROQUEL) 50 MG tablet       Take 1 tablet (50 mg total) by mouth nightly as needed (insonmia).    Take 1 tablet (50 mg total) by mouth nightly as needed (insonmia).    VALSARTAN (DIOVAN) 320 MG TABLET valsartan (DIOVAN) 320 MG tablet       Take 1 tablet (320 mg total) by mouth once daily.    Take 1 tablet (320 mg total) by mouth once daily.    VENLAFAXINE (EFFEXOR-XR) 75 MG 24 HR CAPSULE venlafaxine (EFFEXOR-XR) 75 MG 24 hr capsule       Take 1 capsule (75 mg total) by mouth once daily.    Take 1 capsule (75 mg total) by mouth once daily.   Discontinued Medications    METFORMIN (FORTAMET) 500 MG 24HR TABLET    Take 2 tablets (1,000 mg total) by mouth 2 (two) times daily with meals.     Nain was seen today for hand pain.    Diagnoses and all orders for this visit:    Pain of right thumb  -     C-Reactive Protein; Future  -     Sedimentation rate; Future  -     Cyclic Citrullinated Peptide Antibody, IgG; Future  -     Rheumatoid Factor; Future  -     ASHOK Screen w/Reflex; Future  -     CK; Future  -     Uric Acid; Future  -     X-Ray Finger 2 or More Views; Future  -     colchicine (COLCRYS) 0.6 mg tablet; Take 1 tablet (0.6 mg total) by mouth 2 (two) times daily as needed (gout). Change to cap if needed for insurance coverage  -     HYDROcodone-acetaminophen (NORCO) 5-325 mg per tablet; Take 1 tablet by mouth 3 (three) times daily as needed for Pain.  -     CBC auto differential; Future    Severe episode of recurrent major depressive disorder, without psychotic features  -     QUEtiapine (SEROQUEL) 50 MG tablet; Take 1 tablet (50 mg total) by mouth nightly as needed (insonmia).  -     venlafaxine (EFFEXOR-XR) 75 MG 24 hr capsule; Take 1 capsule (75 mg total) by mouth once  daily.    Hypertension associated with diabetes  -     valsartan (DIOVAN) 320 MG tablet; Take 1 tablet (320 mg total) by mouth once daily.    Type 2 diabetes mellitus with other specified complication, without long-term current use of insulin  -     Hemoglobin A1C; Future  -     Comprehensive Metabolic Panel; Future  -     Microalbumin/Creatinine Ratio, Urine; Future    Hypertension associated with diabetes  Comments:  Controlled, continue current regimen  Orders:  -     valsartan (DIOVAN) 320 MG tablet; Take 1 tablet (320 mg total) by mouth once daily.    Prostate cancer screening  -     PSA, Screening; Future    Annual physical exam  -     Hemoglobin A1C; Future  -     Comprehensive Metabolic Panel; Future  -     Lipid Panel; Future  -     PSA, Screening; Future  -     Microalbumin/Creatinine Ratio, Urine; Future      See meds, orders, follow up, routing and instructions sections of encounter and AVS. Discussed with patient and provided on AVS.    I think the current issue may be a new diagnosis of gout.  Will treat as such.  Patient advised to go to emergency room for swelling redness that extends proximally or cross the hand.  He agreed to do so.  Will check laboratory today including that for his annual.  Empiric treatment with colchicine since I think in S AID and steroid may cause interaction with his current medications and or conditions.    Addendum note:  Patient was exquisitely tender to the thumb and essentially grab me when we tried to examine it.  It appeared to be distally neurovascular intact.  There was no resisted flexor tendon S pain in the 2nd through 5th digits of the ipsilateral hand.  He was able to partially passively extend the affected thumb.  No skin breaks, abrasions were noted.  No erythema beyond the 1st MCP joint.  He was tender both the MCP and IP joint.

## 2020-10-16 ENCOUNTER — TELEPHONE (OUTPATIENT)
Dept: INTERNAL MEDICINE | Facility: CLINIC | Age: 40
End: 2020-10-16

## 2020-10-17 ENCOUNTER — PATIENT MESSAGE (OUTPATIENT)
Dept: INTERNAL MEDICINE | Facility: CLINIC | Age: 40
End: 2020-10-17

## 2020-10-19 ENCOUNTER — PATIENT MESSAGE (OUTPATIENT)
Dept: INTERNAL MEDICINE | Facility: CLINIC | Age: 40
End: 2020-10-19

## 2020-10-19 DIAGNOSIS — F33.2 SEVERE EPISODE OF RECURRENT MAJOR DEPRESSIVE DISORDER, WITHOUT PSYCHOTIC FEATURES: ICD-10-CM

## 2020-10-19 RX ORDER — VENLAFAXINE HYDROCHLORIDE 75 MG/1
75 CAPSULE, EXTENDED RELEASE ORAL DAILY
Qty: 90 CAPSULE | Refills: 0 | Status: SHIPPED | OUTPATIENT
Start: 2020-10-19 | End: 2020-11-29 | Stop reason: SDUPTHER

## 2020-11-29 DIAGNOSIS — F33.2 SEVERE EPISODE OF RECURRENT MAJOR DEPRESSIVE DISORDER, WITHOUT PSYCHOTIC FEATURES: ICD-10-CM

## 2020-11-30 RX ORDER — VENLAFAXINE HYDROCHLORIDE 75 MG/1
75 CAPSULE, EXTENDED RELEASE ORAL DAILY
Qty: 90 CAPSULE | Refills: 1 | Status: SHIPPED | OUTPATIENT
Start: 2020-11-30 | End: 2021-03-29 | Stop reason: SDUPTHER

## 2020-12-05 DIAGNOSIS — E11.59 HYPERTENSION ASSOCIATED WITH DIABETES: ICD-10-CM

## 2020-12-05 DIAGNOSIS — I15.2 HYPERTENSION ASSOCIATED WITH DIABETES: ICD-10-CM

## 2020-12-05 DIAGNOSIS — E11.69 DIABETES MELLITUS TYPE 2 IN OBESE: ICD-10-CM

## 2020-12-05 DIAGNOSIS — F33.2 SEVERE EPISODE OF RECURRENT MAJOR DEPRESSIVE DISORDER, WITHOUT PSYCHOTIC FEATURES: ICD-10-CM

## 2020-12-05 DIAGNOSIS — E66.9 DIABETES MELLITUS TYPE 2 IN OBESE: ICD-10-CM

## 2020-12-05 DIAGNOSIS — E11.69 HYPERLIPIDEMIA ASSOCIATED WITH TYPE 2 DIABETES MELLITUS: ICD-10-CM

## 2020-12-05 DIAGNOSIS — M79.644 PAIN OF RIGHT THUMB: ICD-10-CM

## 2020-12-05 DIAGNOSIS — E78.5 HYPERLIPIDEMIA ASSOCIATED WITH TYPE 2 DIABETES MELLITUS: ICD-10-CM

## 2020-12-05 RX ORDER — QUETIAPINE FUMARATE 50 MG/1
50 TABLET, FILM COATED ORAL NIGHTLY PRN
Qty: 90 TABLET | Refills: 0 | Status: CANCELLED | OUTPATIENT
Start: 2020-12-05

## 2020-12-07 ENCOUNTER — PATIENT MESSAGE (OUTPATIENT)
Dept: INTERNAL MEDICINE | Facility: CLINIC | Age: 40
End: 2020-12-07

## 2020-12-07 DIAGNOSIS — F33.2 SEVERE EPISODE OF RECURRENT MAJOR DEPRESSIVE DISORDER, WITHOUT PSYCHOTIC FEATURES: ICD-10-CM

## 2020-12-07 DIAGNOSIS — B00.52 HERPES KERATITIS: ICD-10-CM

## 2020-12-07 RX ORDER — QUETIAPINE FUMARATE 50 MG/1
50 TABLET, FILM COATED ORAL NIGHTLY PRN
Qty: 90 TABLET | Refills: 0 | Status: CANCELLED | OUTPATIENT
Start: 2020-12-07

## 2020-12-07 RX ORDER — ACYCLOVIR 400 MG/1
400 TABLET ORAL 2 TIMES DAILY
Qty: 60 TABLET | Refills: 12 | Status: SHIPPED | OUTPATIENT
Start: 2020-12-07 | End: 2021-03-29 | Stop reason: SDUPTHER

## 2020-12-07 RX ORDER — COLCHICINE 0.6 MG/1
0.6 TABLET ORAL 2 TIMES DAILY PRN
Qty: 30 TABLET | Refills: 0 | Status: SHIPPED | OUTPATIENT
Start: 2020-12-07 | End: 2022-03-16

## 2020-12-07 RX ORDER — VALSARTAN 320 MG/1
320 TABLET ORAL DAILY
Qty: 90 TABLET | Refills: 1 | Status: SHIPPED | OUTPATIENT
Start: 2020-12-07 | End: 2021-02-24 | Stop reason: SDUPTHER

## 2020-12-07 RX ORDER — HYDROCHLOROTHIAZIDE 25 MG/1
25 TABLET ORAL DAILY
Qty: 90 TABLET | Refills: 3 | Status: SHIPPED | OUTPATIENT
Start: 2020-12-07 | End: 2021-03-04 | Stop reason: SDUPTHER

## 2020-12-07 RX ORDER — METFORMIN HYDROCHLORIDE EXTENDED-RELEASE TABLETS 500 MG/1
1000 TABLET, FILM COATED, EXTENDED RELEASE ORAL 2 TIMES DAILY WITH MEALS
Qty: 360 TABLET | Refills: 3 | Status: SHIPPED | OUTPATIENT
Start: 2020-12-07 | End: 2022-03-16 | Stop reason: SDUPTHER

## 2020-12-07 RX ORDER — GLIPIZIDE 5 MG/1
5 TABLET, FILM COATED, EXTENDED RELEASE ORAL
Qty: 90 TABLET | Refills: 3 | Status: SHIPPED | OUTPATIENT
Start: 2020-12-07 | End: 2021-04-09 | Stop reason: SDUPTHER

## 2020-12-07 RX ORDER — QUETIAPINE FUMARATE 50 MG/1
50 TABLET, FILM COATED ORAL NIGHTLY PRN
Qty: 90 TABLET | Refills: 0 | Status: SHIPPED | OUTPATIENT
Start: 2020-12-07 | End: 2021-02-08 | Stop reason: SDUPTHER

## 2020-12-07 RX ORDER — ROSUVASTATIN CALCIUM 40 MG/1
40 TABLET, COATED ORAL NIGHTLY
Qty: 90 TABLET | Refills: 3 | Status: SHIPPED | OUTPATIENT
Start: 2020-12-07 | End: 2021-04-12 | Stop reason: SDUPTHER

## 2020-12-07 NOTE — TELEPHONE ENCOUNTER
Please call to let pt know that I refilled his seroquel nightly. Please see if it's helping with his sleep and depression. Schedule 3 mo in person f/u w/ me please. Thanks!

## 2020-12-07 NOTE — PROGRESS NOTES
Refill Routing Note   Medication(s) are not appropriate for processing by [facility] for the following reason(s):     - Non-participating provider  [facility] action(s):  Route        Medication reconciliation completed: No   Automatic Epic Generated Protocol Data:        Requested Prescriptions   Pending Prescriptions Disp Refills    metFORMIN (FORTAMET) 500 mg 24hr tablet 360 tablet 3     Sig: Take 2 tablets (1,000 mg total) by mouth 2 (two) times daily with meals.       Endocrinology:  Diabetes - Biguanides Passed - 12/7/2020  8:19 AM        Passed - Patient is at least 18 years old        Passed - Office visit in past 12 months or future 90 days     Recent Outpatient Visits            1 month ago Pain of right thumb    [facility] Waldo Egan MD    8 months ago Fever, unspecified fever cause    [facility] Huyen Arias MD    9 months ago Type 2 diabetes mellitus without retinopathy    [facility]    1 year ago Diabetes mellitus type 2 in obese    [facility] SANDI Looney    1 year ago Diabetes mellitus type 2 in obese    [facility] SANDI Looney          Future Appointments              In 4 months Huyen Arias MD [facility], [facility]                Passed - Cr is 1.4 or below and within 360 days     Creatinine   Date Value Ref Range Status   10/14/2020 0.9 0.5 - 1.4 mg/dL Final   08/04/2020 0.9 0.5 - 1.4 mg/dL Final   04/01/2020 1.0 0.5 - 1.4 mg/dL Final              Passed - HBA1C is 8 or below and within 180 days     Hemoglobin A1C   Date Value Ref Range Status   10/14/2020 8.0 (H) 4.0 - 5.6 % Final     Comment:     ADA Screening Guidelines:  5.7-6.4%  Consistent with prediabetes  >or=6.5%  Consistent with diabetes  High levels of fetal hemoglobin interfere with the HbA1C  assay. Heterozygous hemoglobin variants (HbS, HgC,  etc)do  not significantly interfere with this assay.   However, presence of multiple variants may affect accuracy.     04/01/2020 9.1 (H) 4.0 - 5.6 % Final     Comment:     ADA Screening Guidelines:  5.7-6.4%  Consistent with prediabetes  >or=6.5%  Consistent with diabetes  High levels of fetal hemoglobin interfere with the HbA1C  assay. Heterozygous hemoglobin variants (HbS, HgC, etc)do  not significantly interfere with this assay.   However, presence of multiple variants may affect accuracy.     04/01/2020 9.1 (H) 4.0 - 5.6 % Final     Comment:     ADA Screening Guidelines:  5.7-6.4%  Consistent with prediabetes  >or=6.5%  Consistent with diabetes  High levels of fetal hemoglobin interfere with the HbA1C  assay. Heterozygous hemoglobin variants (HbS, HgC, etc)do  not significantly interfere with this assay.   However, presence of multiple variants may affect accuracy.                Passed - eGFR is 30 or above and within 360 days     eGFR if non    Date Value Ref Range Status   10/14/2020 >60.0 >60 mL/min/1.73 m^2 Final     Comment:     Calculation used to obtain the estimated glomerular filtration  rate (eGFR) is the CKD-EPI equation.      08/04/2020 >60 >60 mL/min/1.73 m^2 Final     Comment:     Calculation used to obtain the estimated glomerular filtration  rate (eGFR) is the CKD-EPI equation.      04/01/2020 >60.0 >60 mL/min/1.73 m^2 Final     Comment:     Calculation used to obtain the estimated glomerular filtration  rate (eGFR) is the CKD-EPI equation.        eGFR if    Date Value Ref Range Status   10/14/2020 >60.0 >60 mL/min/1.73 m^2 Final   08/04/2020 >60 >60 mL/min/1.73 m^2 Final   04/01/2020 >60.0 >60 mL/min/1.73 m^2 Final                glipiZIDE (GLUCOTROL) 5 MG TR24 90 tablet 3     Sig: Take 1 tablet (5 mg total) by mouth daily with breakfast.       Endocrinology:  Diabetes - Sulfonylureas Passed - 12/7/2020  8:19 AM        Passed - Patient is at least 18 years old         Passed - Office visit in past 12 months or future 90 days     Recent Outpatient Visits            1 month ago Pain of right thumb    [facility] Waldo Egan MD    8 months ago Fever, unspecified fever cause    [facility] Huyen Arias MD    9 months ago Type 2 diabetes mellitus without retinopathy    [facility]    1 year ago Diabetes mellitus type 2 in obese    [facility] SANDI Looney    1 year ago Diabetes mellitus type 2 in obese    [facility] SANDI Looney          Future Appointments              In 4 months Huyen Arias MD [facility], [facility]                Passed - HBA1C is 8 or below and within 180 days     Hemoglobin A1C   Date Value Ref Range Status   10/14/2020 8.0 (H) 4.0 - 5.6 % Final     Comment:     ADA Screening Guidelines:  5.7-6.4%  Consistent with prediabetes  >or=6.5%  Consistent with diabetes  High levels of fetal hemoglobin interfere with the HbA1C  assay. Heterozygous hemoglobin variants (HbS, HgC, etc)do  not significantly interfere with this assay.   However, presence of multiple variants may affect accuracy.     04/01/2020 9.1 (H) 4.0 - 5.6 % Final     Comment:     ADA Screening Guidelines:  5.7-6.4%  Consistent with prediabetes  >or=6.5%  Consistent with diabetes  High levels of fetal hemoglobin interfere with the HbA1C  assay. Heterozygous hemoglobin variants (HbS, HgC, etc)do  not significantly interfere with this assay.   However, presence of multiple variants may affect accuracy.     04/01/2020 9.1 (H) 4.0 - 5.6 % Final     Comment:     ADA Screening Guidelines:  5.7-6.4%  Consistent with prediabetes  >or=6.5%  Consistent with diabetes  High levels of fetal hemoglobin interfere with the HbA1C  assay. Heterozygous hemoglobin variants (HbS, HgC, etc)do  not significantly interfere with this assay.    However, presence of multiple variants may affect accuracy.                Passed - Cr is 1.4 or below and within 360 days     Creatinine   Date Value Ref Range Status   10/14/2020 0.9 0.5 - 1.4 mg/dL Final   08/04/2020 0.9 0.5 - 1.4 mg/dL Final   04/01/2020 1.0 0.5 - 1.4 mg/dL Final              Passed - eGFR is 30 or above and within 360 days     eGFR if non    Date Value Ref Range Status   10/14/2020 >60.0 >60 mL/min/1.73 m^2 Final     Comment:     Calculation used to obtain the estimated glomerular filtration  rate (eGFR) is the CKD-EPI equation.      08/04/2020 >60 >60 mL/min/1.73 m^2 Final     Comment:     Calculation used to obtain the estimated glomerular filtration  rate (eGFR) is the CKD-EPI equation.      04/01/2020 >60.0 >60 mL/min/1.73 m^2 Final     Comment:     Calculation used to obtain the estimated glomerular filtration  rate (eGFR) is the CKD-EPI equation.        eGFR if    Date Value Ref Range Status   10/14/2020 >60.0 >60 mL/min/1.73 m^2 Final   08/04/2020 >60 >60 mL/min/1.73 m^2 Final   04/01/2020 >60.0 >60 mL/min/1.73 m^2 Final                rosuvastatin (CRESTOR) 40 MG Tab 90 tablet 3     Sig: Take 1 tablet (40 mg total) by mouth every evening.       Cardiovascular:  Antilipid - Statins Passed - 12/7/2020  8:19 AM        Passed - Patient is at least 18 years old        Passed - Office visit in past 12 months or future 90 days     Recent Outpatient Visits            1 month ago Pain of right thumb    [facility] Waldo Egan MD    8 months ago Fever, unspecified fever cause    [facility] Huyen Arias MD    9 months ago Type 2 diabetes mellitus without retinopathy    [facility] She[facility] Rodarte, JENNIFER    1 year ago Diabetes mellitus type 2 in obese    [facility] SANDI Looney    1 year ago Diabetes mellitus type 2 in obese    [facility]  SANDI Looney          Future Appointments              In 4 months MD [facility]                Passed - ALT is 94 or below and within 360 days     ALT   Date Value Ref Range Status   10/14/2020 21 10 - 44 U/L Final   08/04/2020 32 10 - 44 U/L Final   04/01/2020 40 10 - 44 U/L Final              Passed - AST is 54 or below and within 360 days     AST   Date Value Ref Range Status   10/14/2020 19 10 - 40 U/L Final   08/04/2020 26 10 - 40 U/L Final   04/01/2020 41 (H) 10 - 40 U/L Final              Passed - Total Cholesterol within 360 days     Cholesterol   Date Value Ref Range Status   10/14/2020 186 120 - 199 mg/dL Final     Comment:     The National Cholesterol Education Program (NCEP) has set the  following guidelines (reference ranges) for Cholesterol:  Optimal.....................<200 mg/dL  Borderline High.............200-239 mg/dL  High........................> or = 240 mg/dL     04/01/2020 152 120 - 199 mg/dL Final     Comment:     The National Cholesterol Education Program (NCEP) has set the  following guidelines (reference ranges) for Cholesterol:  Optimal.....................<200 mg/dL  Borderline High.............200-239 mg/dL  High........................> or = 240 mg/dL     04/01/2020 152 120 - 199 mg/dL Final     Comment:     The National Cholesterol Education Program (NCEP) has set the  following guidelines (reference ranges) for Cholesterol:  Optimal.....................<200 mg/dL  Borderline High.............200-239 mg/dL  High........................> or = 240 mg/dL                Passed - LDL within 360 days     LDL Cholesterol   Date Value Ref Range Status   10/14/2020 121.0 63.0 - 159.0 mg/dL Final     Comment:     The National Cholesterol Education Program (NCEP) has set the  following guidelines (reference values) for LDL Cholesterol:  Optimal.......................<130 mg/dL  Borderline High...............130-159  mg/dL  High..........................160-189 mg/dL  Very High.....................>190 mg/dL              Passed - HDL within 360 days     HDL   Date Value Ref Range Status   10/14/2020 49 40 - 75 mg/dL Final     Comment:     The National Cholesterol Education Program (NCEP) has set the  following guidelines (reference values) for HDL Cholesterol:  Low...............<40 mg/dL  Optimal...........>60 mg/dL              Passed - Triglycerides within 360 days     Triglycerides   Date Value Ref Range Status   10/14/2020 80 30 - 150 mg/dL Final     Comment:     The National Cholesterol Education Program (NCEP) has set the  following guidelines (reference values) for triglycerides:  Normal......................<150 mg/dL  Borderline High.............150-199 mg/dL  High........................200-499 mg/dL     04/01/2020 186 (H) 30 - 150 mg/dL Final     Comment:     The National Cholesterol Education Program (NCEP) has set the  following guidelines (reference values) for triglycerides:  Normal......................<150 mg/dL  Borderline High.............150-199 mg/dL  High........................200-499 mg/dL     04/01/2020 186 (H) 30 - 150 mg/dL Final     Comment:     The National Cholesterol Education Program (NCEP) has set the  following guidelines (reference values) for triglycerides:  Normal......................<150 mg/dL  Borderline High.............150-199 mg/dL  High........................200-499 mg/dL                  hydroCHLOROthiazide (HYDRODIURIL) 25 MG tablet 90 tablet 3     Sig: Take 1 tablet (25 mg total) by mouth once daily.       Cardiovascular: Diuretics - Thiazide Passed - 12/7/2020  8:19 AM        Passed - Patient is at least 18 years old        Passed - Last BP in normal range within 360 days.     BP Readings from Last 3 Encounters:   10/14/20 128/82   08/08/20 (!) 142/92   08/05/20 (!) 172/89              Passed - Office visit in past 12 months or future 90 days     Recent Outpatient Visits             1 month ago Pain of right thumb    [facility] Waldo Egan MD    8 months ago Fever, unspecified fever cause    [facility] Huyen Arias MD    9 months ago Type 2 diabetes mellitus without retinopathy    [facility] Shelli Rodarte, OD    1 year ago Diabetes mellitus type 2 in obese    [facility] SANDI Looney    1 year ago Diabetes mellitus type 2 in obese    [facility] SANDI Looney          Future Appointments              In 4 months Huyen Arias MD [facility], [facility]                Passed - Cr is 1.4 or below and within 360 days     Creatinine   Date Value Ref Range Status   10/14/2020 0.9 0.5 - 1.4 mg/dL Final   08/04/2020 0.9 0.5 - 1.4 mg/dL Final   04/01/2020 1.0 0.5 - 1.4 mg/dL Final              Passed - K in normal range and within 360 days     Potassium   Date Value Ref Range Status   10/14/2020 4.0 3.5 - 5.1 mmol/L Final   08/04/2020 3.5 3.5 - 5.1 mmol/L Final   04/01/2020 4.3 3.5 - 5.1 mmol/L Final              Passed - Na is between 130 and 148 and within 360 days     Sodium   Date Value Ref Range Status   10/14/2020 143 136 - 145 mmol/L Final   08/04/2020 136 136 - 145 mmol/L Final   04/01/2020 134 (L) 136 - 145 mmol/L Final              Passed - eGFR within 360 days     eGFR if non    Date Value Ref Range Status   10/14/2020 >60.0 >60 mL/min/1.73 m^2 Final     Comment:     Calculation used to obtain the estimated glomerular filtration  rate (eGFR) is the CKD-EPI equation.      08/04/2020 >60 >60 mL/min/1.73 m^2 Final     Comment:     Calculation used to obtain the estimated glomerular filtration  rate (eGFR) is the CKD-EPI equation.      04/01/2020 >60.0 >60 mL/min/1.73 m^2 Final     Comment:     Calculation used to obtain the estimated glomerular filtration  rate (eGFR) is the CKD-EPI equation.        eGFR if   American   Date Value Ref Range Status   10/14/2020 >60.0 >60 mL/min/1.73 m^2 Final   08/04/2020 >60 >60 mL/min/1.73 m^2 Final   04/01/2020 >60.0 >60 mL/min/1.73 m^2 Final                    Appointments  past 12m or future 3m with PCP    Date Provider   Last Visit   4/1/2020 Huyen Arias MD   Next Visit   4/6/2021 Huyen Arias MD   ED visits in past 90 days: 0        Note composed:1:34 PM 12/07/2020

## 2020-12-07 NOTE — PROGRESS NOTES
Refill Routing Note   Medication(s) are not appropriate for processing by [facility] for the following reason(s):     - Non-participating provider  [facility] action(s):  Route        Medication reconciliation completed: No   Automatic Epic Generated Protocol Data:        Requested Prescriptions   Pending Prescriptions Disp Refills    valsartan (DIOVAN) 320 MG tablet 90 tablet 0     Sig: Take 1 tablet (320 mg total) by mouth once daily.       Cardiovascular:  Angiotensin Receptor Blockers Passed - 12/7/2020  9:44 AM        Passed - Patient is at least 18 years old        Passed - Last BP in normal range within 360 days.     BP Readings from Last 3 Encounters:   10/14/20 128/82   08/08/20 (!) 142/92   08/05/20 (!) 172/89              Passed - Office visit in past 12 months or future 90 days     Recent Outpatient Visits            1 month ago Pain of right thumb    [facility] Waldo Egan MD    8 months ago Fever, unspecified fever cause    [facility] Huyen Arias MD    9 months ago Type 2 diabetes mellitus without retinopathy    [facility] Rodarte[facility] JENNIFER    1 year ago Diabetes mellitus type 2 in obese    [facility] SANDI Looney    1 year ago Diabetes mellitus type 2 in obese    [facility] SANDI Looney          Future Appointments              In 4 months Huyen Arias MD [facility]                Passed - Cr is 1.4 or below and within 360 days     Creatinine   Date Value Ref Range Status   10/14/2020 0.9 0.5 - 1.4 mg/dL Final   08/04/2020 0.9 0.5 - 1.4 mg/dL Final   04/01/2020 1.0 0.5 - 1.4 mg/dL Final              Passed - K in normal range and within 360 days     Potassium   Date Value Ref Range Status   10/14/2020 4.0 3.5 - 5.1 mmol/L Final   08/04/2020 3.5 3.5 - 5.1 mmol/L Final   04/01/2020 4.3 3.5 - 5.1 mmol/L Final               Passed - eGFR within 360 days     eGFR if non    Date Value Ref Range Status   10/14/2020 >60.0 >60 mL/min/1.73 m^2 Final     Comment:     Calculation used to obtain the estimated glomerular filtration  rate (eGFR) is the CKD-EPI equation.      08/04/2020 >60 >60 mL/min/1.73 m^2 Final     Comment:     Calculation used to obtain the estimated glomerular filtration  rate (eGFR) is the CKD-EPI equation.      04/01/2020 >60.0 >60 mL/min/1.73 m^2 Final     Comment:     Calculation used to obtain the estimated glomerular filtration  rate (eGFR) is the CKD-EPI equation.        eGFR if    Date Value Ref Range Status   10/14/2020 >60.0 >60 mL/min/1.73 m^2 Final   08/04/2020 >60 >60 mL/min/1.73 m^2 Final   04/01/2020 >60.0 >60 mL/min/1.73 m^2 Final                colchicine (COLCRYS) 0.6 mg tablet 30 tablet 0     Sig: Take 1 tablet (0.6 mg total) by mouth 2 (two) times daily as needed (gout). Change to cap if needed for insurance coverage       Endocrinology: Gout Agents - colchicine Passed - 12/7/2020  9:44 AM        Passed - Patient is at least 18 years old        Passed - Office visit in past 12 months or future 90 days     Recent Outpatient Visits            1 month ago Pain of right thumb    [facility] Waldo Egan MD    8 months ago Fever, unspecified fever cause    [facility] Huyen Arias MD    9 months ago Type 2 diabetes mellitus without retinopathy    [facility] Shelli Rodarte, JENNIFER    1 year ago Diabetes mellitus type 2 in obese    [facility] SANDI Looney    1 year ago Diabetes mellitus type 2 in obese    [facility] SANDI Looney          Future Appointments              In 4 months Huyen Arias MD [facility]                Passed - Cr is 1.4 or below and within 360 days     Creatinine   Date Value Ref Range  Status   10/14/2020 0.9 0.5 - 1.4 mg/dL Final   08/04/2020 0.9 0.5 - 1.4 mg/dL Final   04/01/2020 1.0 0.5 - 1.4 mg/dL Final              Passed - eGFR is 30 or above and within 360 days     eGFR if non    Date Value Ref Range Status   10/14/2020 >60.0 >60 mL/min/1.73 m^2 Final     Comment:     Calculation used to obtain the estimated glomerular filtration  rate (eGFR) is the CKD-EPI equation.      08/04/2020 >60 >60 mL/min/1.73 m^2 Final     Comment:     Calculation used to obtain the estimated glomerular filtration  rate (eGFR) is the CKD-EPI equation.      04/01/2020 >60.0 >60 mL/min/1.73 m^2 Final     Comment:     Calculation used to obtain the estimated glomerular filtration  rate (eGFR) is the CKD-EPI equation.        eGFR if    Date Value Ref Range Status   10/14/2020 >60.0 >60 mL/min/1.73 m^2 Final   08/04/2020 >60 >60 mL/min/1.73 m^2 Final   04/01/2020 >60.0 >60 mL/min/1.73 m^2 Final              Passed - Uric Acid within 360 days     Uric Acid   Date Value Ref Range Status   10/14/2020 8.3 (H) 3.4 - 7.0 mg/dL Final                    Appointments  past 12m or future 3m with PCP    Date Provider   Last Visit   4/1/2020 Huyen Arias MD   Next Visit   12/5/2020 Huyen Arias MD   ED visits in past 90 days: 0        Note composed:2:07 PM 12/07/2020

## 2020-12-07 NOTE — TELEPHONE ENCOUNTER
"Attempted to reach pt. No answer and couldn't leave message. "Call can't be completed as dialed. Sent pt a portal message.  "

## 2020-12-09 DIAGNOSIS — E11.9 TYPE 2 DIABETES MELLITUS WITHOUT COMPLICATION: ICD-10-CM

## 2020-12-09 NOTE — TELEPHONE ENCOUNTER
Called pt and scheduled his follow up with Guido Arias and also his lab appointment.  Pt is establishing care for eye doctor with Dr. Rodarte  
Please call pt to remind him he's due for follow up for his diabetes. Please schedule and labs a week prior.  
0 = understands/communicates without difficulty

## 2021-01-04 ENCOUNTER — PATIENT MESSAGE (OUTPATIENT)
Dept: ADMINISTRATIVE | Facility: HOSPITAL | Age: 41
End: 2021-01-04

## 2021-02-06 ENCOUNTER — PATIENT MESSAGE (OUTPATIENT)
Dept: INTERNAL MEDICINE | Facility: CLINIC | Age: 41
End: 2021-02-06

## 2021-02-08 ENCOUNTER — PATIENT MESSAGE (OUTPATIENT)
Dept: INTERNAL MEDICINE | Facility: CLINIC | Age: 41
End: 2021-02-08

## 2021-02-08 DIAGNOSIS — F33.2 SEVERE EPISODE OF RECURRENT MAJOR DEPRESSIVE DISORDER, WITHOUT PSYCHOTIC FEATURES: ICD-10-CM

## 2021-02-08 RX ORDER — QUETIAPINE FUMARATE 50 MG/1
50 TABLET, FILM COATED ORAL NIGHTLY PRN
Qty: 90 TABLET | Refills: 1 | Status: SHIPPED | OUTPATIENT
Start: 2021-02-08 | End: 2022-02-12 | Stop reason: SDUPTHER

## 2021-02-24 DIAGNOSIS — I15.2 HYPERTENSION ASSOCIATED WITH DIABETES: ICD-10-CM

## 2021-02-24 DIAGNOSIS — E11.59 HYPERTENSION ASSOCIATED WITH DIABETES: ICD-10-CM

## 2021-02-24 RX ORDER — VALSARTAN 320 MG/1
320 TABLET ORAL DAILY
Qty: 90 TABLET | Refills: 1 | Status: SHIPPED | OUTPATIENT
Start: 2021-02-24 | End: 2021-03-04

## 2021-03-03 ENCOUNTER — TELEPHONE (OUTPATIENT)
Dept: INTERNAL MEDICINE | Facility: CLINIC | Age: 41
End: 2021-03-03

## 2021-03-03 DIAGNOSIS — E11.59 HYPERTENSION ASSOCIATED WITH DIABETES: ICD-10-CM

## 2021-03-03 DIAGNOSIS — I15.2 HYPERTENSION ASSOCIATED WITH DIABETES: ICD-10-CM

## 2021-03-03 DIAGNOSIS — I15.2 HYPERTENSION ASSOCIATED WITH DIABETES: Primary | ICD-10-CM

## 2021-03-03 DIAGNOSIS — E11.59 HYPERTENSION ASSOCIATED WITH DIABETES: Primary | ICD-10-CM

## 2021-03-04 ENCOUNTER — PATIENT MESSAGE (OUTPATIENT)
Dept: INTERNAL MEDICINE | Facility: CLINIC | Age: 41
End: 2021-03-04

## 2021-03-04 RX ORDER — HYDROCHLOROTHIAZIDE 25 MG/1
25 TABLET ORAL DAILY
Qty: 90 TABLET | Refills: 3 | Status: SHIPPED | OUTPATIENT
Start: 2021-03-04 | End: 2022-03-16 | Stop reason: SDUPTHER

## 2021-03-04 RX ORDER — LOSARTAN POTASSIUM 100 MG/1
100 TABLET ORAL DAILY
Qty: 90 TABLET | Refills: 3 | Status: SHIPPED | OUTPATIENT
Start: 2021-03-04 | End: 2021-09-06

## 2021-03-10 DIAGNOSIS — E11.9 TYPE 2 DIABETES MELLITUS WITHOUT COMPLICATION, UNSPECIFIED WHETHER LONG TERM INSULIN USE: ICD-10-CM

## 2021-03-29 ENCOUNTER — PATIENT MESSAGE (OUTPATIENT)
Dept: INTERNAL MEDICINE | Facility: CLINIC | Age: 41
End: 2021-03-29

## 2021-03-29 ENCOUNTER — PATIENT MESSAGE (OUTPATIENT)
Dept: OPTOMETRY | Facility: CLINIC | Age: 41
End: 2021-03-29

## 2021-03-29 DIAGNOSIS — F33.2 SEVERE EPISODE OF RECURRENT MAJOR DEPRESSIVE DISORDER, WITHOUT PSYCHOTIC FEATURES: ICD-10-CM

## 2021-03-29 RX ORDER — VENLAFAXINE HYDROCHLORIDE 75 MG/1
75 CAPSULE, EXTENDED RELEASE ORAL DAILY
Qty: 90 CAPSULE | Refills: 1 | Status: SHIPPED | OUTPATIENT
Start: 2021-03-29 | End: 2022-03-16 | Stop reason: SDUPTHER

## 2021-03-30 ENCOUNTER — TELEPHONE (OUTPATIENT)
Dept: OPTOMETRY | Facility: CLINIC | Age: 41
End: 2021-03-30

## 2021-04-01 ENCOUNTER — PATIENT OUTREACH (OUTPATIENT)
Dept: ADMINISTRATIVE | Facility: OTHER | Age: 41
End: 2021-04-01

## 2021-04-06 ENCOUNTER — OFFICE VISIT (OUTPATIENT)
Dept: OPTOMETRY | Facility: CLINIC | Age: 41
End: 2021-04-06
Payer: COMMERCIAL

## 2021-04-06 ENCOUNTER — LAB VISIT (OUTPATIENT)
Dept: LAB | Facility: HOSPITAL | Age: 41
End: 2021-04-06
Attending: INTERNAL MEDICINE
Payer: COMMERCIAL

## 2021-04-06 ENCOUNTER — OFFICE VISIT (OUTPATIENT)
Dept: INTERNAL MEDICINE | Facility: CLINIC | Age: 41
End: 2021-04-06
Payer: COMMERCIAL

## 2021-04-06 VITALS
WEIGHT: 315 LBS | OXYGEN SATURATION: 98 % | TEMPERATURE: 99 F | BODY MASS INDEX: 46.65 KG/M2 | DIASTOLIC BLOOD PRESSURE: 86 MMHG | HEART RATE: 77 BPM | HEIGHT: 69 IN | SYSTOLIC BLOOD PRESSURE: 138 MMHG

## 2021-04-06 DIAGNOSIS — H52.4 PRESBYOPIA: ICD-10-CM

## 2021-04-06 DIAGNOSIS — Z12.5 PROSTATE CANCER SCREENING: ICD-10-CM

## 2021-04-06 DIAGNOSIS — E66.9 DIABETES MELLITUS TYPE 2 IN OBESE: ICD-10-CM

## 2021-04-06 DIAGNOSIS — E11.9 TYPE 2 DIABETES MELLITUS WITHOUT RETINOPATHY: ICD-10-CM

## 2021-04-06 DIAGNOSIS — E11.69 HYPERLIPIDEMIA ASSOCIATED WITH TYPE 2 DIABETES MELLITUS: ICD-10-CM

## 2021-04-06 DIAGNOSIS — I15.2 HYPERTENSION ASSOCIATED WITH DIABETES: ICD-10-CM

## 2021-04-06 DIAGNOSIS — E11.69 DIABETES MELLITUS TYPE 2 IN OBESE: ICD-10-CM

## 2021-04-06 DIAGNOSIS — E11.59 HYPERTENSION ASSOCIATED WITH DIABETES: ICD-10-CM

## 2021-04-06 DIAGNOSIS — B00.52 HERPES KERATITIS: Primary | ICD-10-CM

## 2021-04-06 DIAGNOSIS — H31.8 CHOROIDAL LESION: ICD-10-CM

## 2021-04-06 DIAGNOSIS — Z00.00 ANNUAL PHYSICAL EXAM: Primary | ICD-10-CM

## 2021-04-06 DIAGNOSIS — Z00.00 ANNUAL PHYSICAL EXAM: ICD-10-CM

## 2021-04-06 DIAGNOSIS — F43.21 ADJUSTMENT DISORDER WITH DEPRESSED MOOD: ICD-10-CM

## 2021-04-06 DIAGNOSIS — E78.5 HYPERLIPIDEMIA ASSOCIATED WITH TYPE 2 DIABETES MELLITUS: ICD-10-CM

## 2021-04-06 DIAGNOSIS — G47.33 OSA ON CPAP: ICD-10-CM

## 2021-04-06 DIAGNOSIS — G47.00 INSOMNIA, UNSPECIFIED TYPE: ICD-10-CM

## 2021-04-06 PROBLEM — E66.01 MORBID OBESITY WITH BMI OF 45.0-49.9, ADULT: Status: RESOLVED | Noted: 2019-10-14 | Resolved: 2021-04-06

## 2021-04-06 LAB
ALBUMIN SERPL BCP-MCNC: 3.7 G/DL (ref 3.5–5.2)
ALP SERPL-CCNC: 80 U/L (ref 55–135)
ALT SERPL W/O P-5'-P-CCNC: 22 U/L (ref 10–44)
ANION GAP SERPL CALC-SCNC: 8 MMOL/L (ref 8–16)
AST SERPL-CCNC: 16 U/L (ref 10–40)
BASOPHILS # BLD AUTO: 0.04 K/UL (ref 0–0.2)
BASOPHILS NFR BLD: 0.6 % (ref 0–1.9)
BILIRUB SERPL-MCNC: 0.4 MG/DL (ref 0.1–1)
BUN SERPL-MCNC: 15 MG/DL (ref 6–20)
CALCIUM SERPL-MCNC: 8.9 MG/DL (ref 8.7–10.5)
CHLORIDE SERPL-SCNC: 106 MMOL/L (ref 95–110)
CHOLEST SERPL-MCNC: 169 MG/DL (ref 120–199)
CHOLEST/HDLC SERPL: 4.6 {RATIO} (ref 2–5)
CO2 SERPL-SCNC: 26 MMOL/L (ref 23–29)
COMPLEXED PSA SERPL-MCNC: 0.6 NG/ML (ref 0–4)
CREAT SERPL-MCNC: 1.1 MG/DL (ref 0.5–1.4)
DIFFERENTIAL METHOD: ABNORMAL
EOSINOPHIL # BLD AUTO: 0.3 K/UL (ref 0–0.5)
EOSINOPHIL NFR BLD: 4.2 % (ref 0–8)
ERYTHROCYTE [DISTWIDTH] IN BLOOD BY AUTOMATED COUNT: 12.2 % (ref 11.5–14.5)
EST. GFR  (AFRICAN AMERICAN): >60 ML/MIN/1.73 M^2
EST. GFR  (NON AFRICAN AMERICAN): >60 ML/MIN/1.73 M^2
ESTIMATED AVG GLUCOSE: 183 MG/DL (ref 68–131)
GLUCOSE SERPL-MCNC: 144 MG/DL (ref 70–110)
HBA1C MFR BLD: 8 % (ref 4–5.6)
HCT VFR BLD AUTO: 38.7 % (ref 40–54)
HDLC SERPL-MCNC: 37 MG/DL (ref 40–75)
HDLC SERPL: 21.9 % (ref 20–50)
HGB BLD-MCNC: 13 G/DL (ref 14–18)
IMM GRANULOCYTES # BLD AUTO: 0.04 K/UL (ref 0–0.04)
IMM GRANULOCYTES NFR BLD AUTO: 0.6 % (ref 0–0.5)
LDLC SERPL CALC-MCNC: 113.6 MG/DL (ref 63–159)
LYMPHOCYTES # BLD AUTO: 3.2 K/UL (ref 1–4.8)
LYMPHOCYTES NFR BLD: 48.2 % (ref 18–48)
MCH RBC QN AUTO: 32.4 PG (ref 27–31)
MCHC RBC AUTO-ENTMCNC: 33.6 G/DL (ref 32–36)
MCV RBC AUTO: 97 FL (ref 82–98)
MONOCYTES # BLD AUTO: 0.5 K/UL (ref 0.3–1)
MONOCYTES NFR BLD: 7.1 % (ref 4–15)
NEUTROPHILS # BLD AUTO: 2.6 K/UL (ref 1.8–7.7)
NEUTROPHILS NFR BLD: 39.3 % (ref 38–73)
NONHDLC SERPL-MCNC: 132 MG/DL
NRBC BLD-RTO: 0 /100 WBC
PLATELET # BLD AUTO: 379 K/UL (ref 150–450)
PMV BLD AUTO: 9.7 FL (ref 9.2–12.9)
POTASSIUM SERPL-SCNC: 4.2 MMOL/L (ref 3.5–5.1)
PROT SERPL-MCNC: 7.2 G/DL (ref 6–8.4)
RBC # BLD AUTO: 4.01 M/UL (ref 4.6–6.2)
SODIUM SERPL-SCNC: 140 MMOL/L (ref 136–145)
T4 FREE SERPL-MCNC: 0.84 NG/DL (ref 0.71–1.51)
TRIGL SERPL-MCNC: 92 MG/DL (ref 30–150)
TSH SERPL DL<=0.005 MIU/L-ACNC: 0.27 UIU/ML (ref 0.4–4)
WBC # BLD AUTO: 6.6 K/UL (ref 3.9–12.7)

## 2021-04-06 PROCEDURE — 99999 PR PBB SHADOW E&M-EST. PATIENT-LVL IV: CPT | Mod: PBBFAC,,, | Performed by: INTERNAL MEDICINE

## 2021-04-06 PROCEDURE — 3072F PR LOW RISK FOR RETINOPATHY: ICD-10-PCS | Mod: S$GLB,,, | Performed by: INTERNAL MEDICINE

## 2021-04-06 PROCEDURE — 85025 COMPLETE CBC W/AUTO DIFF WBC: CPT | Performed by: INTERNAL MEDICINE

## 2021-04-06 PROCEDURE — 3052F PR MOST RECENT HEMOGLOBIN A1C LEVEL 8.0 - < 9.0%: ICD-10-PCS | Mod: CPTII,S$GLB,, | Performed by: INTERNAL MEDICINE

## 2021-04-06 PROCEDURE — 99396 PR PREVENTIVE VISIT,EST,40-64: ICD-10-PCS | Mod: S$GLB,,, | Performed by: INTERNAL MEDICINE

## 2021-04-06 PROCEDURE — 1126F PR PAIN SEVERITY QUANTIFIED, NO PAIN PRESENT: ICD-10-PCS | Mod: S$GLB,,, | Performed by: INTERNAL MEDICINE

## 2021-04-06 PROCEDURE — 3079F PR MOST RECENT DIASTOLIC BLOOD PRESSURE 80-89 MM HG: ICD-10-PCS | Mod: CPTII,S$GLB,, | Performed by: INTERNAL MEDICINE

## 2021-04-06 PROCEDURE — 84153 ASSAY OF PSA TOTAL: CPT | Performed by: INTERNAL MEDICINE

## 2021-04-06 PROCEDURE — 84439 ASSAY OF FREE THYROXINE: CPT | Performed by: INTERNAL MEDICINE

## 2021-04-06 PROCEDURE — 1126F AMNT PAIN NOTED NONE PRSNT: CPT | Mod: S$GLB,,, | Performed by: OPTOMETRIST

## 2021-04-06 PROCEDURE — 3079F DIAST BP 80-89 MM HG: CPT | Mod: CPTII,S$GLB,, | Performed by: INTERNAL MEDICINE

## 2021-04-06 PROCEDURE — 92014 COMPRE OPH EXAM EST PT 1/>: CPT | Mod: S$GLB,,, | Performed by: OPTOMETRIST

## 2021-04-06 PROCEDURE — 1126F AMNT PAIN NOTED NONE PRSNT: CPT | Mod: S$GLB,,, | Performed by: INTERNAL MEDICINE

## 2021-04-06 PROCEDURE — 3008F BODY MASS INDEX DOCD: CPT | Mod: CPTII,S$GLB,, | Performed by: INTERNAL MEDICINE

## 2021-04-06 PROCEDURE — 2023F PR DILATED RETINAL EXAM W/O EVID OF RETINOPATHY: ICD-10-PCS | Mod: S$GLB,,, | Performed by: OPTOMETRIST

## 2021-04-06 PROCEDURE — 84443 ASSAY THYROID STIM HORMONE: CPT | Performed by: INTERNAL MEDICINE

## 2021-04-06 PROCEDURE — 99999 PR PBB SHADOW E&M-EST. PATIENT-LVL IV: ICD-10-PCS | Mod: PBBFAC,,, | Performed by: INTERNAL MEDICINE

## 2021-04-06 PROCEDURE — 3008F PR BODY MASS INDEX (BMI) DOCUMENTED: ICD-10-PCS | Mod: CPTII,S$GLB,, | Performed by: INTERNAL MEDICINE

## 2021-04-06 PROCEDURE — 99396 PREV VISIT EST AGE 40-64: CPT | Mod: S$GLB,,, | Performed by: INTERNAL MEDICINE

## 2021-04-06 PROCEDURE — 2023F DILAT RTA XM W/O RTNOPTHY: CPT | Mod: S$GLB,,, | Performed by: OPTOMETRIST

## 2021-04-06 PROCEDURE — 80053 COMPREHEN METABOLIC PANEL: CPT | Performed by: INTERNAL MEDICINE

## 2021-04-06 PROCEDURE — 99999 PR PBB SHADOW E&M-EST. PATIENT-LVL III: ICD-10-PCS | Mod: PBBFAC,,, | Performed by: OPTOMETRIST

## 2021-04-06 PROCEDURE — 99999 PR PBB SHADOW E&M-EST. PATIENT-LVL III: CPT | Mod: PBBFAC,,, | Performed by: OPTOMETRIST

## 2021-04-06 PROCEDURE — 3052F HG A1C>EQUAL 8.0%<EQUAL 9.0%: CPT | Mod: CPTII,S$GLB,, | Performed by: INTERNAL MEDICINE

## 2021-04-06 PROCEDURE — 1126F PR PAIN SEVERITY QUANTIFIED, NO PAIN PRESENT: ICD-10-PCS | Mod: S$GLB,,, | Performed by: OPTOMETRIST

## 2021-04-06 PROCEDURE — 3075F SYST BP GE 130 - 139MM HG: CPT | Mod: CPTII,S$GLB,, | Performed by: INTERNAL MEDICINE

## 2021-04-06 PROCEDURE — 3072F LOW RISK FOR RETINOPATHY: CPT | Mod: S$GLB,,, | Performed by: INTERNAL MEDICINE

## 2021-04-06 PROCEDURE — 36415 COLL VENOUS BLD VENIPUNCTURE: CPT | Performed by: INTERNAL MEDICINE

## 2021-04-06 PROCEDURE — 92014 PR EYE EXAM, EST PATIENT,COMPREHESV: ICD-10-PCS | Mod: S$GLB,,, | Performed by: OPTOMETRIST

## 2021-04-06 PROCEDURE — 80061 LIPID PANEL: CPT | Performed by: INTERNAL MEDICINE

## 2021-04-06 PROCEDURE — 3075F PR MOST RECENT SYSTOLIC BLOOD PRESS GE 130-139MM HG: ICD-10-PCS | Mod: CPTII,S$GLB,, | Performed by: INTERNAL MEDICINE

## 2021-04-06 PROCEDURE — 83036 HEMOGLOBIN GLYCOSYLATED A1C: CPT | Performed by: INTERNAL MEDICINE

## 2021-04-06 RX ORDER — ACETAMINOPHEN 500 MG
1 TABLET ORAL DAILY
Qty: 1 EACH | Refills: 0 | Status: SHIPPED | OUTPATIENT
Start: 2021-04-06 | End: 2022-03-16

## 2021-04-09 ENCOUNTER — TELEPHONE (OUTPATIENT)
Dept: INTERNAL MEDICINE | Facility: CLINIC | Age: 41
End: 2021-04-09

## 2021-04-09 DIAGNOSIS — E11.69 HYPERLIPIDEMIA ASSOCIATED WITH TYPE 2 DIABETES MELLITUS: ICD-10-CM

## 2021-04-09 DIAGNOSIS — E78.5 HYPERLIPIDEMIA ASSOCIATED WITH TYPE 2 DIABETES MELLITUS: ICD-10-CM

## 2021-04-09 RX ORDER — GLIPIZIDE 5 MG/1
10 TABLET, FILM COATED, EXTENDED RELEASE ORAL
Qty: 180 TABLET | Refills: 3 | Status: SHIPPED | OUTPATIENT
Start: 2021-04-09 | End: 2021-08-12 | Stop reason: SDUPTHER

## 2021-04-12 RX ORDER — ROSUVASTATIN CALCIUM 40 MG/1
40 TABLET, COATED ORAL NIGHTLY
Qty: 90 TABLET | Refills: 3 | Status: SHIPPED | OUTPATIENT
Start: 2021-04-12 | End: 2022-03-16 | Stop reason: SDUPTHER

## 2021-04-29 ENCOUNTER — PATIENT OUTREACH (OUTPATIENT)
Dept: ADMINISTRATIVE | Facility: HOSPITAL | Age: 41
End: 2021-04-29

## 2021-05-21 ENCOUNTER — PATIENT OUTREACH (OUTPATIENT)
Dept: ADMINISTRATIVE | Facility: HOSPITAL | Age: 41
End: 2021-05-21

## 2021-05-21 DIAGNOSIS — E11.69 TYPE 2 DIABETES MELLITUS WITH OTHER SPECIFIED COMPLICATION, WITHOUT LONG-TERM CURRENT USE OF INSULIN: Primary | ICD-10-CM

## 2021-06-01 ENCOUNTER — PATIENT MESSAGE (OUTPATIENT)
Dept: INTERNAL MEDICINE | Facility: CLINIC | Age: 41
End: 2021-06-01

## 2021-06-02 ENCOUNTER — OFFICE VISIT (OUTPATIENT)
Dept: INTERNAL MEDICINE | Facility: CLINIC | Age: 41
End: 2021-06-02
Payer: COMMERCIAL

## 2021-06-02 ENCOUNTER — HOSPITAL ENCOUNTER (EMERGENCY)
Facility: HOSPITAL | Age: 41
Discharge: HOME | End: 2021-06-02
Attending: EMERGENCY MEDICINE
Payer: COMMERCIAL

## 2021-06-02 VITALS
OXYGEN SATURATION: 97 % | TEMPERATURE: 99 F | HEART RATE: 73 BPM | WEIGHT: 315 LBS | HEIGHT: 69 IN | SYSTOLIC BLOOD PRESSURE: 173 MMHG | BODY MASS INDEX: 46.65 KG/M2 | RESPIRATION RATE: 18 BRPM | DIASTOLIC BLOOD PRESSURE: 104 MMHG

## 2021-06-02 DIAGNOSIS — R29.810 FACIAL DROOP: Primary | ICD-10-CM

## 2021-06-02 DIAGNOSIS — I10 HYPERTENSION: ICD-10-CM

## 2021-06-02 DIAGNOSIS — G51.0 BELL'S PALSY: Primary | ICD-10-CM

## 2021-06-02 LAB
ALBUMIN SERPL BCP-MCNC: 4 G/DL (ref 3.5–5.2)
ALP SERPL-CCNC: 80 U/L (ref 55–135)
ALT SERPL W/O P-5'-P-CCNC: 19 U/L (ref 10–44)
ANION GAP SERPL CALC-SCNC: 12 MMOL/L (ref 8–16)
AST SERPL-CCNC: 24 U/L (ref 10–40)
BASOPHILS # BLD AUTO: 0.03 K/UL (ref 0–0.2)
BASOPHILS NFR BLD: 0.4 % (ref 0–1.9)
BILIRUB SERPL-MCNC: 0.6 MG/DL (ref 0.1–1)
BUN SERPL-MCNC: 7 MG/DL (ref 6–20)
CALCIUM SERPL-MCNC: 9.5 MG/DL (ref 8.7–10.5)
CHLORIDE SERPL-SCNC: 110 MMOL/L (ref 95–110)
CO2 SERPL-SCNC: 21 MMOL/L (ref 23–29)
CREAT SERPL-MCNC: 0.9 MG/DL (ref 0.5–1.4)
DIFFERENTIAL METHOD: ABNORMAL
EOSINOPHIL # BLD AUTO: 0.2 K/UL (ref 0–0.5)
EOSINOPHIL NFR BLD: 2.9 % (ref 0–8)
ERYTHROCYTE [DISTWIDTH] IN BLOOD BY AUTOMATED COUNT: 12 % (ref 11.5–14.5)
EST. GFR  (AFRICAN AMERICAN): >60 ML/MIN/1.73 M^2
EST. GFR  (NON AFRICAN AMERICAN): >60 ML/MIN/1.73 M^2
GLUCOSE SERPL-MCNC: 108 MG/DL (ref 70–110)
HCT VFR BLD AUTO: 38.5 % (ref 40–54)
HCV AB SERPL QL IA: NEGATIVE
HGB BLD-MCNC: 12.8 G/DL (ref 14–18)
HIV 1+2 AB+HIV1 P24 AG SERPL QL IA: NEGATIVE
IMM GRANULOCYTES # BLD AUTO: 0.04 K/UL (ref 0–0.04)
IMM GRANULOCYTES NFR BLD AUTO: 0.5 % (ref 0–0.5)
LYMPHOCYTES # BLD AUTO: 3.7 K/UL (ref 1–4.8)
LYMPHOCYTES NFR BLD: 46.9 % (ref 18–48)
MCH RBC QN AUTO: 31.5 PG (ref 27–31)
MCHC RBC AUTO-ENTMCNC: 33.2 G/DL (ref 32–36)
MCV RBC AUTO: 95 FL (ref 82–98)
MONOCYTES # BLD AUTO: 0.6 K/UL (ref 0.3–1)
MONOCYTES NFR BLD: 7.8 % (ref 4–15)
NEUTROPHILS # BLD AUTO: 3.3 K/UL (ref 1.8–7.7)
NEUTROPHILS NFR BLD: 41.5 % (ref 38–73)
NRBC BLD-RTO: 0 /100 WBC
PLATELET # BLD AUTO: 365 K/UL (ref 150–450)
PMV BLD AUTO: 9.1 FL (ref 9.2–12.9)
POTASSIUM SERPL-SCNC: 4 MMOL/L (ref 3.5–5.1)
PROT SERPL-MCNC: 7.5 G/DL (ref 6–8.4)
RBC # BLD AUTO: 4.06 M/UL (ref 4.6–6.2)
SODIUM SERPL-SCNC: 143 MMOL/L (ref 136–145)
WBC # BLD AUTO: 7.86 K/UL (ref 3.9–12.7)

## 2021-06-02 PROCEDURE — 99284 EMERGENCY DEPT VISIT MOD MDM: CPT

## 2021-06-02 PROCEDURE — 99284 PR EMERGENCY DEPT VISIT,LEVEL IV: ICD-10-PCS | Mod: ,,, | Performed by: PHYSICIAN ASSISTANT

## 2021-06-02 PROCEDURE — 99284 EMERGENCY DEPT VISIT MOD MDM: CPT | Mod: ,,, | Performed by: PHYSICIAN ASSISTANT

## 2021-06-02 PROCEDURE — 99499 UNLISTED E&M SERVICE: CPT | Mod: S$GLB,,, | Performed by: NURSE PRACTITIONER

## 2021-06-02 PROCEDURE — 99499 NO LOS: ICD-10-PCS | Mod: S$GLB,,, | Performed by: NURSE PRACTITIONER

## 2021-06-02 PROCEDURE — 25000003 PHARM REV CODE 250: Performed by: PHYSICIAN ASSISTANT

## 2021-06-02 PROCEDURE — 3072F LOW RISK FOR RETINOPATHY: CPT | Mod: S$GLB,,, | Performed by: NURSE PRACTITIONER

## 2021-06-02 PROCEDURE — 85025 COMPLETE CBC W/AUTO DIFF WBC: CPT | Performed by: PHYSICIAN ASSISTANT

## 2021-06-02 PROCEDURE — 86703 HIV-1/HIV-2 1 RESULT ANTBDY: CPT | Performed by: EMERGENCY MEDICINE

## 2021-06-02 PROCEDURE — 3072F PR LOW RISK FOR RETINOPATHY: ICD-10-PCS | Mod: S$GLB,,, | Performed by: NURSE PRACTITIONER

## 2021-06-02 PROCEDURE — 80053 COMPREHEN METABOLIC PANEL: CPT | Performed by: PHYSICIAN ASSISTANT

## 2021-06-02 PROCEDURE — 86803 HEPATITIS C AB TEST: CPT | Performed by: EMERGENCY MEDICINE

## 2021-06-02 RX ORDER — PREDNISONE 20 MG/1
60 TABLET ORAL DAILY
Qty: 21 TABLET | Refills: 0 | Status: SHIPPED | OUTPATIENT
Start: 2021-06-02 | End: 2021-06-10

## 2021-06-02 RX ORDER — ACETAMINOPHEN 500 MG
1000 TABLET ORAL
Status: COMPLETED | OUTPATIENT
Start: 2021-06-02 | End: 2021-06-02

## 2021-06-02 RX ORDER — VALACYCLOVIR HYDROCHLORIDE 1 G/1
500 TABLET, FILM COATED ORAL 3 TIMES DAILY
Qty: 11 TABLET | Refills: 0 | Status: SHIPPED | OUTPATIENT
Start: 2021-06-02 | End: 2022-03-16

## 2021-06-02 RX ADMIN — ACETAMINOPHEN 1000 MG: 500 TABLET ORAL at 05:06

## 2021-06-03 ENCOUNTER — OFFICE VISIT (OUTPATIENT)
Dept: INTERNAL MEDICINE | Facility: CLINIC | Age: 41
End: 2021-06-03
Payer: COMMERCIAL

## 2021-06-03 ENCOUNTER — PATIENT MESSAGE (OUTPATIENT)
Dept: INTERNAL MEDICINE | Facility: CLINIC | Age: 41
End: 2021-06-03

## 2021-06-03 ENCOUNTER — TELEPHONE (OUTPATIENT)
Dept: INTERNAL MEDICINE | Facility: CLINIC | Age: 41
End: 2021-06-03

## 2021-06-03 VITALS
BODY MASS INDEX: 45 KG/M2 | HEIGHT: 69 IN | WEIGHT: 303.81 LBS | SYSTOLIC BLOOD PRESSURE: 138 MMHG | DIASTOLIC BLOOD PRESSURE: 90 MMHG | HEART RATE: 71 BPM | OXYGEN SATURATION: 99 %

## 2021-06-03 DIAGNOSIS — R29.810 FACIAL DROOP: Primary | ICD-10-CM

## 2021-06-03 DIAGNOSIS — I10 BENIGN ESSENTIAL HYPERTENSION: ICD-10-CM

## 2021-06-03 PROCEDURE — 3072F PR LOW RISK FOR RETINOPATHY: ICD-10-PCS | Mod: S$GLB,,, | Performed by: INTERNAL MEDICINE

## 2021-06-03 PROCEDURE — 3008F PR BODY MASS INDEX (BMI) DOCUMENTED: ICD-10-PCS | Mod: CPTII,S$GLB,, | Performed by: INTERNAL MEDICINE

## 2021-06-03 PROCEDURE — 3008F BODY MASS INDEX DOCD: CPT | Mod: CPTII,S$GLB,, | Performed by: INTERNAL MEDICINE

## 2021-06-03 PROCEDURE — 1126F PR PAIN SEVERITY QUANTIFIED, NO PAIN PRESENT: ICD-10-PCS | Mod: S$GLB,,, | Performed by: INTERNAL MEDICINE

## 2021-06-03 PROCEDURE — 3072F LOW RISK FOR RETINOPATHY: CPT | Mod: S$GLB,,, | Performed by: INTERNAL MEDICINE

## 2021-06-03 PROCEDURE — 99999 PR PBB SHADOW E&M-EST. PATIENT-LVL IV: CPT | Mod: PBBFAC,,, | Performed by: INTERNAL MEDICINE

## 2021-06-03 PROCEDURE — 99214 PR OFFICE/OUTPT VISIT, EST, LEVL IV, 30-39 MIN: ICD-10-PCS | Mod: S$GLB,,, | Performed by: INTERNAL MEDICINE

## 2021-06-03 PROCEDURE — 99214 OFFICE O/P EST MOD 30 MIN: CPT | Mod: S$GLB,,, | Performed by: INTERNAL MEDICINE

## 2021-06-03 PROCEDURE — 99999 PR PBB SHADOW E&M-EST. PATIENT-LVL IV: ICD-10-PCS | Mod: PBBFAC,,, | Performed by: INTERNAL MEDICINE

## 2021-06-03 PROCEDURE — 1126F AMNT PAIN NOTED NONE PRSNT: CPT | Mod: S$GLB,,, | Performed by: INTERNAL MEDICINE

## 2021-06-09 ENCOUNTER — PATIENT MESSAGE (OUTPATIENT)
Dept: INTERNAL MEDICINE | Facility: CLINIC | Age: 41
End: 2021-06-09

## 2021-06-10 ENCOUNTER — PATIENT MESSAGE (OUTPATIENT)
Dept: INTERNAL MEDICINE | Facility: CLINIC | Age: 41
End: 2021-06-10

## 2021-06-10 RX ORDER — PREDNISONE 20 MG/1
60 TABLET ORAL DAILY
Qty: 21 TABLET | Refills: 0 | Status: CANCELLED | OUTPATIENT
Start: 2021-06-10 | End: 2021-06-17

## 2021-06-17 ENCOUNTER — PATIENT OUTREACH (OUTPATIENT)
Dept: ADMINISTRATIVE | Facility: HOSPITAL | Age: 41
End: 2021-06-17

## 2021-08-03 ENCOUNTER — PATIENT MESSAGE (OUTPATIENT)
Dept: ADMINISTRATIVE | Facility: HOSPITAL | Age: 41
End: 2021-08-03

## 2021-08-12 ENCOUNTER — TELEPHONE (OUTPATIENT)
Dept: PULMONOLOGY | Facility: CLINIC | Age: 41
End: 2021-08-12

## 2021-08-12 ENCOUNTER — TELEPHONE (OUTPATIENT)
Dept: SLEEP MEDICINE | Facility: CLINIC | Age: 41
End: 2021-08-12

## 2021-08-12 DIAGNOSIS — E11.69 HYPERLIPIDEMIA ASSOCIATED WITH TYPE 2 DIABETES MELLITUS: ICD-10-CM

## 2021-08-12 DIAGNOSIS — E78.5 HYPERLIPIDEMIA ASSOCIATED WITH TYPE 2 DIABETES MELLITUS: ICD-10-CM

## 2021-08-12 RX ORDER — GLIPIZIDE 5 MG/1
10 TABLET, FILM COATED, EXTENDED RELEASE ORAL
Qty: 180 TABLET | Refills: 3 | Status: SHIPPED | OUTPATIENT
Start: 2021-08-12 | End: 2022-03-16 | Stop reason: SDUPTHER

## 2021-08-13 ENCOUNTER — PATIENT MESSAGE (OUTPATIENT)
Dept: INTERNAL MEDICINE | Facility: CLINIC | Age: 41
End: 2021-08-13

## 2021-08-23 ENCOUNTER — HOSPITAL ENCOUNTER (EMERGENCY)
Facility: HOSPITAL | Age: 41
Discharge: HOME | End: 2021-08-23
Attending: EMERGENCY MEDICINE
Payer: COMMERCIAL

## 2021-08-23 ENCOUNTER — PATIENT OUTREACH (OUTPATIENT)
Dept: EMERGENCY MEDICINE | Facility: HOSPITAL | Age: 41
End: 2021-08-23

## 2021-08-23 VITALS
HEART RATE: 84 BPM | OXYGEN SATURATION: 99 % | SYSTOLIC BLOOD PRESSURE: 137 MMHG | TEMPERATURE: 98 F | HEIGHT: 69 IN | BODY MASS INDEX: 46.65 KG/M2 | RESPIRATION RATE: 16 BRPM | DIASTOLIC BLOOD PRESSURE: 84 MMHG | WEIGHT: 315 LBS

## 2021-08-23 DIAGNOSIS — K76.0 HEPATIC STEATOSIS: ICD-10-CM

## 2021-08-23 DIAGNOSIS — R10.9 ABDOMINAL PAIN: ICD-10-CM

## 2021-08-23 DIAGNOSIS — K55.069 OMENTAL INFARCTION: Primary | ICD-10-CM

## 2021-08-23 LAB
ALBUMIN SERPL BCP-MCNC: 4 G/DL (ref 3.5–5.2)
ALP SERPL-CCNC: 90 U/L (ref 55–135)
ALT SERPL W/O P-5'-P-CCNC: 21 U/L (ref 10–44)
ANION GAP SERPL CALC-SCNC: 11 MMOL/L (ref 8–16)
AST SERPL-CCNC: 20 U/L (ref 10–40)
BACTERIA #/AREA URNS AUTO: ABNORMAL /HPF
BASOPHILS # BLD AUTO: 0.02 K/UL (ref 0–0.2)
BASOPHILS NFR BLD: 0.3 % (ref 0–1.9)
BILIRUB SERPL-MCNC: 1.1 MG/DL (ref 0.1–1)
BILIRUB UR QL STRIP: NEGATIVE
BUN SERPL-MCNC: 9 MG/DL (ref 6–20)
BUN SERPL-MCNC: 9 MG/DL (ref 6–30)
CALCIUM SERPL-MCNC: 9.6 MG/DL (ref 8.7–10.5)
CHLORIDE SERPL-SCNC: 101 MMOL/L (ref 95–110)
CHLORIDE SERPL-SCNC: 101 MMOL/L (ref 95–110)
CLARITY UR REFRACT.AUTO: ABNORMAL
CO2 SERPL-SCNC: 20 MMOL/L (ref 23–29)
COLOR UR AUTO: YELLOW
CREAT SERPL-MCNC: 0.7 MG/DL (ref 0.5–1.4)
CREAT SERPL-MCNC: 0.9 MG/DL (ref 0.5–1.4)
DIFFERENTIAL METHOD: ABNORMAL
EOSINOPHIL # BLD AUTO: 0.2 K/UL (ref 0–0.5)
EOSINOPHIL NFR BLD: 2.4 % (ref 0–8)
ERYTHROCYTE [DISTWIDTH] IN BLOOD BY AUTOMATED COUNT: 11.6 % (ref 11.5–14.5)
EST. GFR  (AFRICAN AMERICAN): >60 ML/MIN/1.73 M^2
EST. GFR  (NON AFRICAN AMERICAN): >60 ML/MIN/1.73 M^2
GLUCOSE SERPL-MCNC: 221 MG/DL (ref 70–110)
GLUCOSE SERPL-MCNC: 240 MG/DL (ref 70–110)
GLUCOSE UR QL STRIP: ABNORMAL
HCT VFR BLD AUTO: 39.9 % (ref 40–54)
HCT VFR BLD CALC: 40 %PCV (ref 36–54)
HGB BLD-MCNC: 13.8 G/DL (ref 14–18)
HGB UR QL STRIP: NEGATIVE
HYALINE CASTS UR QL AUTO: 8 /LPF
IMM GRANULOCYTES # BLD AUTO: 0.02 K/UL (ref 0–0.04)
IMM GRANULOCYTES NFR BLD AUTO: 0.3 % (ref 0–0.5)
KETONES UR QL STRIP: ABNORMAL
LEUKOCYTE ESTERASE UR QL STRIP: NEGATIVE
LIPASE SERPL-CCNC: 18 U/L (ref 4–60)
LYMPHOCYTES # BLD AUTO: 2.3 K/UL (ref 1–4.8)
LYMPHOCYTES NFR BLD: 31.2 % (ref 18–48)
MCH RBC QN AUTO: 31 PG (ref 27–31)
MCHC RBC AUTO-ENTMCNC: 34.6 G/DL (ref 32–36)
MCV RBC AUTO: 90 FL (ref 82–98)
MICROSCOPIC COMMENT: ABNORMAL
MONOCYTES # BLD AUTO: 0.6 K/UL (ref 0.3–1)
MONOCYTES NFR BLD: 8.3 % (ref 4–15)
NEUTROPHILS # BLD AUTO: 4.2 K/UL (ref 1.8–7.7)
NEUTROPHILS NFR BLD: 57.5 % (ref 38–73)
NITRITE UR QL STRIP: NEGATIVE
NRBC BLD-RTO: 0 /100 WBC
PH UR STRIP: 5 [PH] (ref 5–8)
PLATELET # BLD AUTO: 331 K/UL (ref 150–450)
PMV BLD AUTO: 9.4 FL (ref 9.2–12.9)
POC IONIZED CALCIUM: 1.2 MMOL/L (ref 1.06–1.42)
POC TCO2 (MEASURED): 24 MMOL/L (ref 23–29)
POTASSIUM BLD-SCNC: 4 MMOL/L (ref 3.5–5.1)
POTASSIUM SERPL-SCNC: 4 MMOL/L (ref 3.5–5.1)
PROT SERPL-MCNC: 7.7 G/DL (ref 6–8.4)
PROT UR QL STRIP: ABNORMAL
RBC # BLD AUTO: 4.45 M/UL (ref 4.6–6.2)
RBC #/AREA URNS AUTO: 3 /HPF (ref 0–4)
SAMPLE: ABNORMAL
SODIUM BLD-SCNC: 136 MMOL/L (ref 136–145)
SODIUM SERPL-SCNC: 132 MMOL/L (ref 136–145)
SP GR UR STRIP: 1.03 (ref 1–1.03)
SQUAMOUS #/AREA URNS AUTO: 2 /HPF
TROPONIN I SERPL DL<=0.01 NG/ML-MCNC: <0.006 NG/ML (ref 0–0.03)
URN SPEC COLLECT METH UR: ABNORMAL
WBC # BLD AUTO: 7.35 K/UL (ref 3.9–12.7)
WBC #/AREA URNS AUTO: 1 /HPF (ref 0–5)

## 2021-08-23 PROCEDURE — 63600175 PHARM REV CODE 636 W HCPCS: Performed by: EMERGENCY MEDICINE

## 2021-08-23 PROCEDURE — 80053 COMPREHEN METABOLIC PANEL: CPT | Performed by: EMERGENCY MEDICINE

## 2021-08-23 PROCEDURE — 96374 THER/PROPH/DIAG INJ IV PUSH: CPT

## 2021-08-23 PROCEDURE — 85025 COMPLETE CBC W/AUTO DIFF WBC: CPT | Performed by: EMERGENCY MEDICINE

## 2021-08-23 PROCEDURE — 83690 ASSAY OF LIPASE: CPT | Performed by: EMERGENCY MEDICINE

## 2021-08-23 PROCEDURE — 99284 EMERGENCY DEPT VISIT MOD MDM: CPT | Mod: ,,, | Performed by: EMERGENCY MEDICINE

## 2021-08-23 PROCEDURE — 99285 EMERGENCY DEPT VISIT HI MDM: CPT | Mod: 25

## 2021-08-23 PROCEDURE — 80047 BASIC METABLC PNL IONIZED CA: CPT

## 2021-08-23 PROCEDURE — 84484 ASSAY OF TROPONIN QUANT: CPT | Performed by: EMERGENCY MEDICINE

## 2021-08-23 PROCEDURE — 93005 ELECTROCARDIOGRAM TRACING: CPT

## 2021-08-23 PROCEDURE — 93010 ELECTROCARDIOGRAM REPORT: CPT | Mod: ,,, | Performed by: INTERNAL MEDICINE

## 2021-08-23 PROCEDURE — 81001 URINALYSIS AUTO W/SCOPE: CPT | Performed by: EMERGENCY MEDICINE

## 2021-08-23 PROCEDURE — 93010 EKG 12-LEAD: ICD-10-PCS | Mod: ,,, | Performed by: INTERNAL MEDICINE

## 2021-08-23 PROCEDURE — 99284 PR EMERGENCY DEPT VISIT,LEVEL IV: ICD-10-PCS | Mod: ,,, | Performed by: EMERGENCY MEDICINE

## 2021-08-23 PROCEDURE — 96375 TX/PRO/DX INJ NEW DRUG ADDON: CPT

## 2021-08-23 RX ORDER — KETOROLAC TROMETHAMINE 30 MG/ML
10 INJECTION, SOLUTION INTRAMUSCULAR; INTRAVENOUS
Status: COMPLETED | OUTPATIENT
Start: 2021-08-23 | End: 2021-08-23

## 2021-08-23 RX ORDER — MORPHINE SULFATE 2 MG/ML
6 INJECTION, SOLUTION INTRAMUSCULAR; INTRAVENOUS
Status: COMPLETED | OUTPATIENT
Start: 2021-08-23 | End: 2021-08-23

## 2021-08-23 RX ADMIN — KETOROLAC TROMETHAMINE 10 MG: 30 INJECTION, SOLUTION INTRAMUSCULAR; INTRAVENOUS at 12:08

## 2021-08-23 RX ADMIN — MORPHINE SULFATE 6 MG: 2 INJECTION, SOLUTION INTRAMUSCULAR; INTRAVENOUS at 01:08

## 2021-08-24 ENCOUNTER — PATIENT MESSAGE (OUTPATIENT)
Dept: INTERNAL MEDICINE | Facility: CLINIC | Age: 41
End: 2021-08-24

## 2021-09-05 ENCOUNTER — PATIENT MESSAGE (OUTPATIENT)
Dept: INTERNAL MEDICINE | Facility: CLINIC | Age: 41
End: 2021-09-05

## 2021-09-06 RX ORDER — VALSARTAN 320 MG/1
320 TABLET ORAL DAILY
COMMUNITY
Start: 2021-07-15 | End: 2021-09-06 | Stop reason: SDUPTHER

## 2021-09-06 RX ORDER — VALSARTAN 320 MG/1
320 TABLET ORAL DAILY
Qty: 90 TABLET | Refills: 3 | Status: SHIPPED | OUTPATIENT
Start: 2021-09-06 | End: 2022-03-16 | Stop reason: SDUPTHER

## 2021-09-28 ENCOUNTER — TELEPHONE (OUTPATIENT)
Dept: INTERNAL MEDICINE | Facility: CLINIC | Age: 41
End: 2021-09-28

## 2021-09-28 DIAGNOSIS — E11.69 HYPERLIPIDEMIA ASSOCIATED WITH TYPE 2 DIABETES MELLITUS: Primary | ICD-10-CM

## 2021-09-28 DIAGNOSIS — E78.5 HYPERLIPIDEMIA ASSOCIATED WITH TYPE 2 DIABETES MELLITUS: Primary | ICD-10-CM

## 2021-10-04 ENCOUNTER — PATIENT MESSAGE (OUTPATIENT)
Dept: ADMINISTRATIVE | Facility: HOSPITAL | Age: 41
End: 2021-10-04

## 2021-12-21 ENCOUNTER — IMMUNIZATION (OUTPATIENT)
Dept: INTERNAL MEDICINE | Facility: CLINIC | Age: 41
End: 2021-12-21
Payer: COMMERCIAL

## 2021-12-21 DIAGNOSIS — Z23 NEED FOR VACCINATION: Primary | ICD-10-CM

## 2021-12-21 PROCEDURE — 0004A COVID-19, MRNA, LNP-S, PF, 30 MCG/0.3 ML DOSE VACCINE: CPT | Mod: CV19,PBBFAC | Performed by: INTERNAL MEDICINE

## 2022-02-01 ENCOUNTER — PATIENT MESSAGE (OUTPATIENT)
Dept: ADMINISTRATIVE | Facility: HOSPITAL | Age: 42
End: 2022-02-01
Payer: COMMERCIAL

## 2022-03-16 ENCOUNTER — OFFICE VISIT (OUTPATIENT)
Dept: INTERNAL MEDICINE | Facility: CLINIC | Age: 42
End: 2022-03-16
Payer: COMMERCIAL

## 2022-03-16 VITALS
HEART RATE: 89 BPM | DIASTOLIC BLOOD PRESSURE: 86 MMHG | HEIGHT: 69 IN | WEIGHT: 315 LBS | OXYGEN SATURATION: 98 % | SYSTOLIC BLOOD PRESSURE: 142 MMHG | BODY MASS INDEX: 46.65 KG/M2

## 2022-03-16 DIAGNOSIS — E11.69 DIABETES MELLITUS TYPE 2 IN OBESE: ICD-10-CM

## 2022-03-16 DIAGNOSIS — I15.2 HYPERTENSION ASSOCIATED WITH DIABETES: ICD-10-CM

## 2022-03-16 DIAGNOSIS — B00.52 HERPES KERATITIS: ICD-10-CM

## 2022-03-16 DIAGNOSIS — E11.59 HYPERTENSION ASSOCIATED WITH DIABETES: ICD-10-CM

## 2022-03-16 DIAGNOSIS — R43.9 TASTE DISORDER: ICD-10-CM

## 2022-03-16 DIAGNOSIS — E66.01 CLASS 3 SEVERE OBESITY DUE TO EXCESS CALORIES WITH BODY MASS INDEX (BMI) OF 45.0 TO 49.9 IN ADULT, UNSPECIFIED WHETHER SERIOUS COMORBIDITY PRESENT: ICD-10-CM

## 2022-03-16 DIAGNOSIS — E11.69 HYPERLIPIDEMIA ASSOCIATED WITH TYPE 2 DIABETES MELLITUS: ICD-10-CM

## 2022-03-16 DIAGNOSIS — E78.5 HYPERLIPIDEMIA ASSOCIATED WITH TYPE 2 DIABETES MELLITUS: ICD-10-CM

## 2022-03-16 DIAGNOSIS — E66.9 DIABETES MELLITUS TYPE 2 IN OBESE: ICD-10-CM

## 2022-03-16 DIAGNOSIS — Z00.00 ROUTINE PHYSICAL EXAMINATION: Primary | ICD-10-CM

## 2022-03-16 DIAGNOSIS — F33.2 SEVERE EPISODE OF RECURRENT MAJOR DEPRESSIVE DISORDER, WITHOUT PSYCHOTIC FEATURES: ICD-10-CM

## 2022-03-16 PROCEDURE — 3052F HG A1C>EQUAL 8.0%<EQUAL 9.0%: CPT | Mod: CPTII,S$GLB,, | Performed by: STUDENT IN AN ORGANIZED HEALTH CARE EDUCATION/TRAINING PROGRAM

## 2022-03-16 PROCEDURE — 99999 PR PBB SHADOW E&M-EST. PATIENT-LVL IV: ICD-10-PCS | Mod: PBBFAC,,, | Performed by: STUDENT IN AN ORGANIZED HEALTH CARE EDUCATION/TRAINING PROGRAM

## 2022-03-16 PROCEDURE — 3052F PR MOST RECENT HEMOGLOBIN A1C LEVEL 8.0 - < 9.0%: ICD-10-PCS | Mod: CPTII,S$GLB,, | Performed by: STUDENT IN AN ORGANIZED HEALTH CARE EDUCATION/TRAINING PROGRAM

## 2022-03-16 PROCEDURE — 99396 PR PREVENTIVE VISIT,EST,40-64: ICD-10-PCS | Mod: S$GLB,,, | Performed by: STUDENT IN AN ORGANIZED HEALTH CARE EDUCATION/TRAINING PROGRAM

## 2022-03-16 PROCEDURE — 99999 PR PBB SHADOW E&M-EST. PATIENT-LVL IV: CPT | Mod: PBBFAC,,, | Performed by: STUDENT IN AN ORGANIZED HEALTH CARE EDUCATION/TRAINING PROGRAM

## 2022-03-16 PROCEDURE — 99396 PREV VISIT EST AGE 40-64: CPT | Mod: S$GLB,,, | Performed by: STUDENT IN AN ORGANIZED HEALTH CARE EDUCATION/TRAINING PROGRAM

## 2022-03-16 RX ORDER — ROSUVASTATIN CALCIUM 40 MG/1
40 TABLET, COATED ORAL NIGHTLY
Qty: 90 TABLET | Refills: 3 | Status: SHIPPED | OUTPATIENT
Start: 2022-03-16 | End: 2023-09-28 | Stop reason: SDUPTHER

## 2022-03-16 RX ORDER — VENLAFAXINE HYDROCHLORIDE 75 MG/1
75 CAPSULE, EXTENDED RELEASE ORAL DAILY
Qty: 90 CAPSULE | Refills: 1 | Status: SHIPPED | OUTPATIENT
Start: 2022-03-16 | End: 2023-03-29 | Stop reason: SDUPTHER

## 2022-03-16 RX ORDER — QUETIAPINE FUMARATE 50 MG/1
50 TABLET, FILM COATED ORAL NIGHTLY
Qty: 30 TABLET | Refills: 0 | Status: SHIPPED | OUTPATIENT
Start: 2022-03-16 | End: 2022-04-30 | Stop reason: SDUPTHER

## 2022-03-16 RX ORDER — GLIPIZIDE 5 MG/1
10 TABLET, FILM COATED, EXTENDED RELEASE ORAL
Qty: 180 TABLET | Refills: 3 | Status: SHIPPED | OUTPATIENT
Start: 2022-03-16 | End: 2023-03-16

## 2022-03-16 RX ORDER — ACYCLOVIR 400 MG/1
400 TABLET ORAL 2 TIMES DAILY
Qty: 60 TABLET | Refills: 0 | Status: SHIPPED | OUTPATIENT
Start: 2022-03-16 | End: 2023-02-23 | Stop reason: SDUPTHER

## 2022-03-16 RX ORDER — VALSARTAN 320 MG/1
320 TABLET ORAL DAILY
Qty: 90 TABLET | Refills: 3 | Status: SHIPPED | OUTPATIENT
Start: 2022-03-16 | End: 2022-07-14 | Stop reason: SDUPTHER

## 2022-03-16 RX ORDER — METFORMIN HYDROCHLORIDE 500 MG/1
1000 TABLET, EXTENDED RELEASE ORAL 2 TIMES DAILY WITH MEALS
Qty: 360 TABLET | Refills: 3 | Status: SHIPPED | OUTPATIENT
Start: 2022-03-16 | End: 2023-05-13 | Stop reason: SDUPTHER

## 2022-03-16 RX ORDER — HYDROCHLOROTHIAZIDE 25 MG/1
25 TABLET ORAL DAILY
Qty: 90 TABLET | Refills: 3 | Status: SHIPPED | OUTPATIENT
Start: 2022-03-16 | End: 2023-09-28 | Stop reason: SDUPTHER

## 2022-03-16 NOTE — PROGRESS NOTES
"RESIDENT CLINIC PROGRESS NOTE    Name: Nain Jain  : 1980  Date of Service: 2022   PCP: Agustin Moeller MD    Reason for visit:   Chief Complaint   Patient presents with    Establish Care       HPI: Nain Jain is a 41 y.o. male  who presents to clinic for establishing care.    Patient has history of diabetes. Recent HbA1c is 8% on 2021. Overall, improving trend - was near 13% about 2 years ago. Patient is on metformin and glipizide. Patient with hypertension on valsartan and hydrochlorothiazide. He does not take ambulatory readings of his blood pressure. Denies any chest pain, headache, leg swelling, shortness of breath, or vision changes. During visit, blood pressure is 142/86. Repeat measurement is 140/82. He continues on crestor. Patient states that his diet has not been good and describes recently starting a "juice" diet up to twice daily.     Patient has history of sleep disturbance for which he takes seroquel. Patient states that he is sleeping well. He using CPAP given his sleep apnea. Tolerating well. Patient takes venlafaxine 75 mg daily for anxiety and depression. He states that he frequently has pervasive thoughts of previous traumatic experiences. He states that his mood is overall ok and does not express thoughts of suicide or self harm. He is optimistic regarding weight loss and lifestyle modification. Patient works as a  and states he is mostly sedentary.    In addition, patient describes tongue "numbness" and "tingling" near the back which started 2-3 days ago. He states that it tastes as though he ate something spicy. He says that he used chloroseptic prior to this. The sensation is persistent. No pain.      Medications:   Current Outpatient Medications:     acyclovir (ZOVIRAX) 400 MG tablet, Take 1 tablet (400 mg total) by mouth 2 (two) times daily., Disp: 60 tablet, Rfl: 0    glipiZIDE (GLUCOTROL) 5 MG TR24, Take 2 tablets (10 mg total) by " "mouth daily with breakfast., Disp: 180 tablet, Rfl: 3    hydroCHLOROthiazide (HYDRODIURIL) 25 MG tablet, Take 1 tablet (25 mg total) by mouth once daily., Disp: 90 tablet, Rfl: 3    metFORMIN (GLUCOPHAGE-XR) 500 MG ER 24hr tablet, Take 2 tablets (1,000 mg total) by mouth 2 (two) times daily with meals., Disp: 360 tablet, Rfl: 3    QUEtiapine (SEROQUEL) 50 MG tablet, Take 1 tablet (50 mg total) by mouth nightly., Disp: 30 tablet, Rfl: 0    rosuvastatin (CRESTOR) 40 MG Tab, Take 1 tablet (40 mg total) by mouth every evening., Disp: 90 tablet, Rfl: 3    valsartan (DIOVAN) 320 MG tablet, Take 1 tablet (320 mg total) by mouth once daily., Disp: 90 tablet, Rfl: 3    venlafaxine (EFFEXOR-XR) 75 MG 24 hr capsule, Take 1 capsule (75 mg total) by mouth once daily., Disp: 90 capsule, Rfl: 1     Review of Systems:   Review of Systems   Constitutional: Negative for chills, diaphoresis, fever, malaise/fatigue and weight loss.   HENT: Negative for congestion, hearing loss, sinus pain, sore throat and tinnitus.    Eyes: Negative for blurred vision, pain, discharge and redness.   Respiratory: Negative for cough, hemoptysis, sputum production, shortness of breath and wheezing.    Cardiovascular: Negative for chest pain, palpitations, orthopnea, claudication and leg swelling.   Gastrointestinal: Negative for abdominal pain, blood in stool, constipation, diarrhea, heartburn, melena, nausea and vomiting.   Genitourinary: Negative for dysuria, frequency and urgency.   Musculoskeletal: Negative for back pain, joint pain, myalgias and neck pain.   Skin: Negative for itching and rash.   Neurological: Negative for dizziness, tremors, speech change, focal weakness, seizures, weakness and headaches.   Endo/Heme/Allergies: Negative for environmental allergies and polydipsia. Does not bruise/bleed easily.       Vitals:   Vitals:    03/16/22 1329   BP: (!) 142/86   Pulse: 89   SpO2: 98%   Weight: (!) 147.9 kg (326 lb 1 oz)   Height: 5' 9" " (1.753 m)     Body mass index is 48.15 kg/m².    Physical Exam:   Physical Exam  Constitutional:       General: He is not in acute distress.     Appearance: He is obese. He is not ill-appearing, toxic-appearing or diaphoretic.   HENT:      Head: Normocephalic and atraumatic.      Nose: No congestion or rhinorrhea.      Mouth/Throat:      Mouth: Mucous membranes are moist.      Pharynx: No oropharyngeal exudate or posterior oropharyngeal erythema.      Comments: No tongue erythema or discoloration  Eyes:      General: No scleral icterus.        Right eye: No discharge.         Left eye: No discharge.   Cardiovascular:      Rate and Rhythm: Normal rate and regular rhythm.      Pulses: Normal pulses.           Dorsalis pedis pulses are 2+ on the right side and 2+ on the left side.        Posterior tibial pulses are 2+ on the right side and 2+ on the left side.      Heart sounds: Normal heart sounds. No murmur heard.    No friction rub. No gallop.   Pulmonary:      Effort: Pulmonary effort is normal. No respiratory distress.      Breath sounds: Normal breath sounds. No stridor. No wheezing, rhonchi or rales.   Chest:      Chest wall: No tenderness.   Abdominal:      General: Abdomen is flat. Bowel sounds are normal. There is no distension.      Palpations: Abdomen is soft. There is no mass.      Tenderness: There is no abdominal tenderness. There is no guarding.   Musculoskeletal:      Right foot: Normal range of motion. No deformity, bunion, Charcot foot, foot drop or prominent metatarsal heads.      Left foot: Normal range of motion. No deformity, bunion, Charcot foot, foot drop or prominent metatarsal heads.   Feet:      Right foot:      Protective Sensation: 5 sites tested. 5 sites sensed.      Skin integrity: Skin integrity normal. No ulcer, blister, skin breakdown, erythema, warmth, callus, dry skin or fissure.      Toenail Condition: Right toenails are normal.      Left foot:      Protective Sensation: 5 sites  tested. 5 sites sensed.      Skin integrity: Skin integrity normal. No ulcer, blister, skin breakdown, erythema, warmth, callus, dry skin or fissure.      Toenail Condition: Left toenails are normal.   Skin:     General: Skin is warm and dry.      Coloration: Skin is not jaundiced.   Neurological:      General: No focal deficit present.      Mental Status: He is alert and oriented to person, place, and time.         Labs: Previous labs reviewed.    Imaging: Previous imaging reviewed.     Assessment/Plan:   Class 3 severe obesity due to excess calories with body mass index (BMI) of 45.0 to 49.9 in adult, unspecified whether serious comorbidity present  Discussed weight loss and lifestyle modification extensively. Patient with goal weight loss of 25 lbs by next follow up visit. Referral to bariatric medicine clinic. Will likely benefit from ozempic or similar medication etc.  -     Ambulatory referral/consult to Bariatric Medicine; Future; Expected date: 03/23/2022    Severe episode of recurrent major depressive disorder, without psychotic features  Patient states effexor has not been effective but he has not had worsening side effects or symptoms. Discussed increasing effexor dose but patient would like to defer for next visit.  -     QUEtiapine (SEROQUEL) 50 MG tablet; Take 1 tablet (50 mg total) by mouth nightly.  Dispense: 30 tablet; Refill: 0  -     venlafaxine (EFFEXOR-XR) 75 MG 24 hr capsule; Take 1 capsule (75 mg total) by mouth once daily.  Dispense: 90 capsule; Refill: 1    Hyperlipidemia associated with type 2 diabetes mellitus  Repeat lipid panel.  -     rosuvastatin (CRESTOR) 40 MG Tab; Take 1 tablet (40 mg total) by mouth every evening.  Dispense: 90 tablet; Refill: 3  -     Lipid Panel; Future; Expected date: 03/16/2022    Hypertension associated with diabetes  Patient could likely be further optimized but we discussed re-evaluating in 3 months with lifestyle modification and weight loss. Medications  "refilled.  -     hydroCHLOROthiazide (HYDRODIURIL) 25 MG tablet; Take 1 tablet (25 mg total) by mouth once daily.  Dispense: 90 tablet; Refill: 3  -     valsartan (DIOVAN) 320 MG tablet; Take 1 tablet (320 mg total) by mouth once daily.  Dispense: 90 tablet; Refill: 3    Diabetes mellitus type 2 in obese  Improving or stable diabetes, discussed lifestyle modifications and preferred dietary change. I recommended against his current "juicing" strategy.  -     metFORMIN (GLUCOPHAGE-XR) 500 MG ER 24hr tablet; Take 2 tablets (1,000 mg total) by mouth 2 (two) times daily with meals.  Dispense: 360 tablet; Refill: 3  -     glipiZIDE (GLUCOTROL) 5 MG TR24; Take 2 tablets (10 mg total) by mouth daily with breakfast.  Dispense: 180 tablet; Refill: 3  -     Hemoglobin A1C; Future; Expected date: 03/16/2022  -     CBC Auto Differential; Future; Expected date: 03/16/2022  -     Comprehensive Metabolic Panel; Future; Expected date: 03/16/2022  -     Ambulatory referral/consult to Optometry; Future; Expected date: 03/23/2022    Herpes keratitis  Previously diagnosed. Refilled per patient request. I have asked him to discuss further with optometry/ophthalmology if long term prescription is warranted. No vision issues at this time.  -     acyclovir (ZOVIRAX) 400 MG tablet; Take 1 tablet (400 mg total) by mouth 2 (two) times daily.  Dispense: 60 tablet; Refill: 0    Taste disorder  Unclear etiology of taste change and tongue sensation - suspect likely related to recent chloroseptic spray use. I asked patient to monitor and follow up.    Fatty liver  Noted on CT AP 2021, discussed lifestyle modifcation    Follow up in 3 months      Discussed with Dr. Aubrey Gillette was seen today for establish care.    Diagnoses and all orders for this visit:    Routine physical examination    Severe episode of recurrent major depressive disorder, without psychotic features  -     QUEtiapine (SEROQUEL) 50 MG tablet; Take 1 tablet (50 mg total) by " mouth nightly.  -     venlafaxine (EFFEXOR-XR) 75 MG 24 hr capsule; Take 1 capsule (75 mg total) by mouth once daily.    Hyperlipidemia associated with type 2 diabetes mellitus  -     glipiZIDE (GLUCOTROL) 5 MG TR24; Take 2 tablets (10 mg total) by mouth daily with breakfast.  -     rosuvastatin (CRESTOR) 40 MG Tab; Take 1 tablet (40 mg total) by mouth every evening.  -     Lipid Panel; Future    Hypertension associated with diabetes  Comments:  Controlled, continue current regimen  Orders:  -     hydroCHLOROthiazide (HYDRODIURIL) 25 MG tablet; Take 1 tablet (25 mg total) by mouth once daily.    Diabetes mellitus type 2 in obese  -     metFORMIN (GLUCOPHAGE-XR) 500 MG ER 24hr tablet; Take 2 tablets (1,000 mg total) by mouth 2 (two) times daily with meals.  -     Hemoglobin A1C; Future  -     CBC Auto Differential; Future  -     Comprehensive Metabolic Panel; Future  -     Ambulatory referral/consult to Optometry; Future    Herpes keratitis  -     acyclovir (ZOVIRAX) 400 MG tablet; Take 1 tablet (400 mg total) by mouth 2 (two) times daily.    Class 3 severe obesity due to excess calories with body mass index (BMI) of 45.0 to 49.9 in adult, unspecified whether serious comorbidity present  -     Ambulatory referral/consult to Bariatric Medicine; Future    Taste disorder    Other orders  -     valsartan (DIOVAN) 320 MG tablet; Take 1 tablet (320 mg total) by mouth once daily.

## 2022-03-16 NOTE — PATIENT INSTRUCTIONS
Plan:  - refilled medications  - follow in 3 months with goal of weight loss (299 lb)  - referral to optometry  - referral to bariatric medicine

## 2022-03-17 ENCOUNTER — LAB VISIT (OUTPATIENT)
Dept: LAB | Facility: HOSPITAL | Age: 42
End: 2022-03-17
Payer: COMMERCIAL

## 2022-03-17 DIAGNOSIS — E78.5 HYPERLIPIDEMIA ASSOCIATED WITH TYPE 2 DIABETES MELLITUS: ICD-10-CM

## 2022-03-17 DIAGNOSIS — E66.9 DIABETES MELLITUS TYPE 2 IN OBESE: ICD-10-CM

## 2022-03-17 DIAGNOSIS — E11.69 DIABETES MELLITUS TYPE 2 IN OBESE: ICD-10-CM

## 2022-03-17 DIAGNOSIS — E11.69 HYPERLIPIDEMIA ASSOCIATED WITH TYPE 2 DIABETES MELLITUS: ICD-10-CM

## 2022-03-17 LAB
ALBUMIN SERPL BCP-MCNC: 4.2 G/DL (ref 3.5–5.2)
ALP SERPL-CCNC: 80 U/L (ref 55–135)
ALT SERPL W/O P-5'-P-CCNC: 34 U/L (ref 10–44)
ANION GAP SERPL CALC-SCNC: 12 MMOL/L (ref 8–16)
AST SERPL-CCNC: 28 U/L (ref 10–40)
BASOPHILS # BLD AUTO: 0.06 K/UL (ref 0–0.2)
BASOPHILS NFR BLD: 0.8 % (ref 0–1.9)
BILIRUB SERPL-MCNC: 0.8 MG/DL (ref 0.1–1)
BUN SERPL-MCNC: 12 MG/DL (ref 6–20)
CALCIUM SERPL-MCNC: 9.9 MG/DL (ref 8.7–10.5)
CHLORIDE SERPL-SCNC: 104 MMOL/L (ref 95–110)
CHOLEST SERPL-MCNC: 171 MG/DL (ref 120–199)
CHOLEST/HDLC SERPL: 4.8 {RATIO} (ref 2–5)
CO2 SERPL-SCNC: 25 MMOL/L (ref 23–29)
CREAT SERPL-MCNC: 1 MG/DL (ref 0.5–1.4)
DIFFERENTIAL METHOD: ABNORMAL
EOSINOPHIL # BLD AUTO: 0.9 K/UL (ref 0–0.5)
EOSINOPHIL NFR BLD: 11.5 % (ref 0–8)
ERYTHROCYTE [DISTWIDTH] IN BLOOD BY AUTOMATED COUNT: 11.9 % (ref 11.5–14.5)
EST. GFR  (AFRICAN AMERICAN): >60 ML/MIN/1.73 M^2
EST. GFR  (NON AFRICAN AMERICAN): >60 ML/MIN/1.73 M^2
ESTIMATED AVG GLUCOSE: 163 MG/DL (ref 68–131)
GLUCOSE SERPL-MCNC: 123 MG/DL (ref 70–110)
HBA1C MFR BLD: 7.3 % (ref 4–5.6)
HCT VFR BLD AUTO: 44.4 % (ref 40–54)
HDLC SERPL-MCNC: 36 MG/DL (ref 40–75)
HDLC SERPL: 21.1 % (ref 20–50)
HGB BLD-MCNC: 14.6 G/DL (ref 14–18)
IMM GRANULOCYTES # BLD AUTO: 0.01 K/UL (ref 0–0.04)
IMM GRANULOCYTES NFR BLD AUTO: 0.1 % (ref 0–0.5)
LDLC SERPL CALC-MCNC: 119.2 MG/DL (ref 63–159)
LYMPHOCYTES # BLD AUTO: 3.2 K/UL (ref 1–4.8)
LYMPHOCYTES NFR BLD: 41.7 % (ref 18–48)
MCH RBC QN AUTO: 31.6 PG (ref 27–31)
MCHC RBC AUTO-ENTMCNC: 32.9 G/DL (ref 32–36)
MCV RBC AUTO: 96 FL (ref 82–98)
MONOCYTES # BLD AUTO: 0.5 K/UL (ref 0.3–1)
MONOCYTES NFR BLD: 7 % (ref 4–15)
NEUTROPHILS # BLD AUTO: 3 K/UL (ref 1.8–7.7)
NEUTROPHILS NFR BLD: 38.9 % (ref 38–73)
NONHDLC SERPL-MCNC: 135 MG/DL
NRBC BLD-RTO: 0 /100 WBC
PLATELET # BLD AUTO: 400 K/UL (ref 150–450)
PMV BLD AUTO: 9.6 FL (ref 9.2–12.9)
POTASSIUM SERPL-SCNC: 4.1 MMOL/L (ref 3.5–5.1)
PROT SERPL-MCNC: 7.9 G/DL (ref 6–8.4)
RBC # BLD AUTO: 4.62 M/UL (ref 4.6–6.2)
SODIUM SERPL-SCNC: 141 MMOL/L (ref 136–145)
TRIGL SERPL-MCNC: 79 MG/DL (ref 30–150)
WBC # BLD AUTO: 7.67 K/UL (ref 3.9–12.7)

## 2022-03-17 PROCEDURE — 80061 LIPID PANEL: CPT | Performed by: STUDENT IN AN ORGANIZED HEALTH CARE EDUCATION/TRAINING PROGRAM

## 2022-03-17 PROCEDURE — 83036 HEMOGLOBIN GLYCOSYLATED A1C: CPT | Performed by: STUDENT IN AN ORGANIZED HEALTH CARE EDUCATION/TRAINING PROGRAM

## 2022-03-17 PROCEDURE — 85025 COMPLETE CBC W/AUTO DIFF WBC: CPT | Performed by: STUDENT IN AN ORGANIZED HEALTH CARE EDUCATION/TRAINING PROGRAM

## 2022-03-17 PROCEDURE — 36415 COLL VENOUS BLD VENIPUNCTURE: CPT | Performed by: STUDENT IN AN ORGANIZED HEALTH CARE EDUCATION/TRAINING PROGRAM

## 2022-03-17 PROCEDURE — 80053 COMPREHEN METABOLIC PANEL: CPT | Performed by: STUDENT IN AN ORGANIZED HEALTH CARE EDUCATION/TRAINING PROGRAM

## 2022-03-20 ENCOUNTER — PATIENT MESSAGE (OUTPATIENT)
Dept: INTERNAL MEDICINE | Facility: CLINIC | Age: 42
End: 2022-03-20
Payer: COMMERCIAL

## 2022-04-30 DIAGNOSIS — F33.2 SEVERE EPISODE OF RECURRENT MAJOR DEPRESSIVE DISORDER, WITHOUT PSYCHOTIC FEATURES: ICD-10-CM

## 2022-05-02 RX ORDER — QUETIAPINE FUMARATE 50 MG/1
50 TABLET, FILM COATED ORAL NIGHTLY
Qty: 30 TABLET | Refills: 0 | Status: SHIPPED | OUTPATIENT
Start: 2022-05-02 | End: 2022-06-17 | Stop reason: SDUPTHER

## 2022-07-22 ENCOUNTER — PATIENT MESSAGE (OUTPATIENT)
Dept: INTERNAL MEDICINE | Facility: CLINIC | Age: 42
End: 2022-07-22
Payer: COMMERCIAL

## 2022-07-25 ENCOUNTER — PATIENT MESSAGE (OUTPATIENT)
Dept: PRIMARY CARE CLINIC | Facility: CLINIC | Age: 42
End: 2022-07-25
Payer: COMMERCIAL

## 2022-07-25 DIAGNOSIS — F33.2 SEVERE EPISODE OF RECURRENT MAJOR DEPRESSIVE DISORDER, WITHOUT PSYCHOTIC FEATURES: ICD-10-CM

## 2022-07-25 RX ORDER — QUETIAPINE FUMARATE 50 MG/1
50 TABLET, FILM COATED ORAL NIGHTLY
Qty: 30 TABLET | Refills: 0 | Status: SHIPPED | OUTPATIENT
Start: 2022-07-25 | End: 2022-10-03 | Stop reason: SDUPTHER

## 2022-10-01 ENCOUNTER — PATIENT MESSAGE (OUTPATIENT)
Dept: PRIMARY CARE CLINIC | Facility: CLINIC | Age: 42
End: 2022-10-01
Payer: COMMERCIAL

## 2022-10-01 DIAGNOSIS — F33.2 SEVERE EPISODE OF RECURRENT MAJOR DEPRESSIVE DISORDER, WITHOUT PSYCHOTIC FEATURES: ICD-10-CM

## 2022-10-03 ENCOUNTER — PATIENT MESSAGE (OUTPATIENT)
Dept: PRIMARY CARE CLINIC | Facility: CLINIC | Age: 42
End: 2022-10-03
Payer: COMMERCIAL

## 2022-10-03 RX ORDER — QUETIAPINE FUMARATE 50 MG/1
50 TABLET, FILM COATED ORAL NIGHTLY
Qty: 90 TABLET | Refills: 3 | Status: SHIPPED | OUTPATIENT
Start: 2022-10-03 | End: 2022-12-12 | Stop reason: SDUPTHER

## 2023-04-05 ENCOUNTER — PATIENT MESSAGE (OUTPATIENT)
Dept: OPTOMETRY | Facility: CLINIC | Age: 43
End: 2023-04-05
Payer: COMMERCIAL

## 2023-05-02 RX ORDER — VALSARTAN 320 MG/1
320 TABLET ORAL DAILY
Qty: 90 TABLET | Refills: 3 | Status: SHIPPED | OUTPATIENT
Start: 2023-05-02 | End: 2023-08-04 | Stop reason: SDUPTHER

## 2023-05-03 ENCOUNTER — OFFICE VISIT (OUTPATIENT)
Dept: OPTOMETRY | Facility: CLINIC | Age: 43
End: 2023-05-03
Payer: COMMERCIAL

## 2023-05-03 DIAGNOSIS — E11.9 DIABETIC EYE EXAM: ICD-10-CM

## 2023-05-03 DIAGNOSIS — E11.69 TYPE 2 DIABETES MELLITUS WITH OTHER SPECIFIED COMPLICATION, WITHOUT LONG-TERM CURRENT USE OF INSULIN: ICD-10-CM

## 2023-05-03 DIAGNOSIS — Z01.00 DIABETIC EYE EXAM: ICD-10-CM

## 2023-05-03 DIAGNOSIS — H52.03 HYPEROPIA OF BOTH EYES: ICD-10-CM

## 2023-05-03 DIAGNOSIS — E11.9 TYPE 2 DIABETES MELLITUS WITHOUT RETINOPATHY: Primary | ICD-10-CM

## 2023-05-03 DIAGNOSIS — Z01.00 COMPLETE EYE EXAM, ENCOUNTER FOR: ICD-10-CM

## 2023-05-03 PROCEDURE — 99999 PR PBB SHADOW E&M-EST. PATIENT-LVL II: ICD-10-PCS | Mod: PBBFAC,,, | Performed by: OPTOMETRIST

## 2023-05-03 PROCEDURE — 99214 OFFICE O/P EST MOD 30 MIN: CPT | Mod: S$GLB,,, | Performed by: OPTOMETRIST

## 2023-05-03 PROCEDURE — 92015 DETERMINE REFRACTIVE STATE: CPT | Mod: S$GLB,,, | Performed by: OPTOMETRIST

## 2023-05-03 PROCEDURE — 99999 PR PBB SHADOW E&M-EST. PATIENT-LVL II: CPT | Mod: PBBFAC,,, | Performed by: OPTOMETRIST

## 2023-05-03 PROCEDURE — 4010F ACE/ARB THERAPY RXD/TAKEN: CPT | Mod: CPTII,S$GLB,, | Performed by: OPTOMETRIST

## 2023-05-03 PROCEDURE — 2023F PR DILATED RETINAL EXAM W/O EVID OF RETINOPATHY: ICD-10-PCS | Mod: CPTII,S$GLB,, | Performed by: OPTOMETRIST

## 2023-05-03 PROCEDURE — 2023F DILAT RTA XM W/O RTNOPTHY: CPT | Mod: CPTII,S$GLB,, | Performed by: OPTOMETRIST

## 2023-05-03 PROCEDURE — 4010F PR ACE/ARB THEARPY RXD/TAKEN: ICD-10-PCS | Mod: CPTII,S$GLB,, | Performed by: OPTOMETRIST

## 2023-05-03 PROCEDURE — 1159F MED LIST DOCD IN RCRD: CPT | Mod: CPTII,S$GLB,, | Performed by: OPTOMETRIST

## 2023-05-03 PROCEDURE — 1159F PR MEDICATION LIST DOCUMENTED IN MEDICAL RECORD: ICD-10-PCS | Mod: CPTII,S$GLB,, | Performed by: OPTOMETRIST

## 2023-05-03 PROCEDURE — 92015 PR REFRACTION: ICD-10-PCS | Mod: S$GLB,,, | Performed by: OPTOMETRIST

## 2023-05-03 PROCEDURE — 99214 PR OFFICE/OUTPT VISIT, EST, LEVL IV, 30-39 MIN: ICD-10-PCS | Mod: S$GLB,,, | Performed by: OPTOMETRIST

## 2023-05-08 NOTE — PROGRESS NOTES
HPI    Diabetic eye exam    First Time Patient    42 y.o. is here for diabetic eye exam  Pt states near vision has gotten worst  Pt wear otc readers +1.25    (-)Flashes (-)Floaters  (-)Itch, (-)tear, (-)burn, (-)Dryness.  (-) OTC Drops  (-)Photophobia(-)Glare   (-) Headaches (-) Eye Pain (-) Double vision    Eye Medications: None    No Past Ocular history     No Family Ocular history       Last edited by Teodora Daugherty MA on 5/3/2023 11:59 AM.            Assessment /Plan     For exam results, see Encounter Report.    Type 2 diabetes mellitus without retinopathy    Complete eye exam, encounter for    Type 2 diabetes mellitus with other specified complication, without long-term current use of insulin    Hyperopia of both eyes    Diabetic eye exam      MONITOR. ED PT ON ALL EXAM FINDINGS  RX FINAL SPECS   OCULAR HEALTH WNL OD, OS   TYPE 2 DM W/O RETINOPATHY OU; CONTINUE WITH PCP FOR GLYCEMIC CONTROL   RTC 1 YR//PRN FOR REE/DFE

## 2023-05-13 DIAGNOSIS — E66.9 DIABETES MELLITUS TYPE 2 IN OBESE: ICD-10-CM

## 2023-05-13 DIAGNOSIS — E11.69 DIABETES MELLITUS TYPE 2 IN OBESE: ICD-10-CM

## 2023-05-13 DIAGNOSIS — F33.2 SEVERE EPISODE OF RECURRENT MAJOR DEPRESSIVE DISORDER, WITHOUT PSYCHOTIC FEATURES: ICD-10-CM

## 2023-05-15 RX ORDER — VENLAFAXINE HYDROCHLORIDE 75 MG/1
75 CAPSULE, EXTENDED RELEASE ORAL DAILY
Qty: 90 CAPSULE | Refills: 1 | Status: SHIPPED | OUTPATIENT
Start: 2023-05-15 | End: 2023-05-23 | Stop reason: SDUPTHER

## 2023-05-15 RX ORDER — METFORMIN HYDROCHLORIDE 500 MG/1
1000 TABLET, EXTENDED RELEASE ORAL 2 TIMES DAILY WITH MEALS
Qty: 360 TABLET | Refills: 3 | Status: SHIPPED | OUTPATIENT
Start: 2023-05-15 | End: 2024-05-14

## 2023-05-23 DIAGNOSIS — F33.2 SEVERE EPISODE OF RECURRENT MAJOR DEPRESSIVE DISORDER, WITHOUT PSYCHOTIC FEATURES: ICD-10-CM

## 2023-05-23 RX ORDER — QUETIAPINE FUMARATE 50 MG/1
50 TABLET, FILM COATED ORAL NIGHTLY
Qty: 90 TABLET | Refills: 3 | Status: SHIPPED | OUTPATIENT
Start: 2023-05-23 | End: 2023-06-16

## 2023-05-23 RX ORDER — VENLAFAXINE HYDROCHLORIDE 75 MG/1
75 CAPSULE, EXTENDED RELEASE ORAL DAILY
Qty: 90 CAPSULE | Refills: 1 | Status: SHIPPED | OUTPATIENT
Start: 2023-05-23 | End: 2023-06-16

## 2023-06-14 ENCOUNTER — PATIENT MESSAGE (OUTPATIENT)
Dept: PRIMARY CARE CLINIC | Facility: CLINIC | Age: 43
End: 2023-06-14
Payer: COMMERCIAL

## 2023-06-14 ENCOUNTER — PATIENT MESSAGE (OUTPATIENT)
Dept: INTERNAL MEDICINE | Facility: CLINIC | Age: 43
End: 2023-06-14
Payer: COMMERCIAL

## 2023-06-14 ENCOUNTER — TELEPHONE (OUTPATIENT)
Dept: INTERNAL MEDICINE | Facility: CLINIC | Age: 43
End: 2023-06-14
Payer: COMMERCIAL

## 2023-06-14 NOTE — TELEPHONE ENCOUNTER
----- Message from Katie Rodas sent at 6/14/2023 10:06 AM CDT -----  Type:  Same Day Appointment Request    Caller is requesting a same day appointment.  Caller declined first available appointment listed below.    Name of Caller:Pt   When is the first available appointment?N/A   Symptoms:anxiety / depression   Best Call Back Number:[phone]  Additional Information: Portal Message        Message  Appointment Request From: Nain Jain  With Provider: Agustin Moeller MD [[facility]]  Preferred Date Range: 6/14/2023 - 6/15/2023  Preferred Times: Any Time  Reason for visit: Anxiety and depression medication    Comments:  Anxiety and depression medication

## 2023-06-14 NOTE — TELEPHONE ENCOUNTER
----- Message from Katie Rodas sent at 6/14/2023 10:06 AM CDT -----  Type:  Same Day Appointment Request    Caller is requesting a same day appointment.  Caller declined first available appointment listed below.    Name of Caller:Pt   When is the first available appointment?N/A   Symptoms:anxiety / depression   Best Call Back Number:[phone]  Additional Information: Portal Message        Message  Appointment Request From: Nain Jain  With Provider: Agustin Moeller MD [[facility]]  Preferred Date Range: 6/14/2023 - 6/15/2023  Preferred Times: Any Time  Reason for visit: Anxiety and depression medication    Comments:  Anxiety and depression medication

## 2023-06-16 ENCOUNTER — OFFICE VISIT (OUTPATIENT)
Dept: INTERNAL MEDICINE | Facility: CLINIC | Age: 43
End: 2023-06-16
Payer: COMMERCIAL

## 2023-06-16 DIAGNOSIS — G47.00 INSOMNIA, UNSPECIFIED TYPE: ICD-10-CM

## 2023-06-16 DIAGNOSIS — F33.2 SEVERE EPISODE OF RECURRENT MAJOR DEPRESSIVE DISORDER, WITHOUT PSYCHOTIC FEATURES: Primary | ICD-10-CM

## 2023-06-16 PROCEDURE — 99213 OFFICE O/P EST LOW 20 MIN: CPT | Mod: S$GLB,,,

## 2023-06-16 PROCEDURE — 3078F DIAST BP <80 MM HG: CPT | Mod: CPTII,S$GLB,,

## 2023-06-16 PROCEDURE — 99999 PR PBB SHADOW E&M-EST. PATIENT-LVL II: ICD-10-PCS | Mod: PBBFAC,,,

## 2023-06-16 PROCEDURE — 99213 PR OFFICE/OUTPT VISIT, EST, LEVL III, 20-29 MIN: ICD-10-PCS | Mod: S$GLB,,,

## 2023-06-16 PROCEDURE — 3078F PR MOST RECENT DIASTOLIC BLOOD PRESSURE < 80 MM HG: ICD-10-PCS | Mod: CPTII,S$GLB,,

## 2023-06-16 PROCEDURE — 4010F ACE/ARB THERAPY RXD/TAKEN: CPT | Mod: CPTII,S$GLB,,

## 2023-06-16 PROCEDURE — 3077F SYST BP >= 140 MM HG: CPT | Mod: CPTII,S$GLB,,

## 2023-06-16 PROCEDURE — 99999 PR PBB SHADOW E&M-EST. PATIENT-LVL II: CPT | Mod: PBBFAC,,,

## 2023-06-16 PROCEDURE — 3008F BODY MASS INDEX DOCD: CPT | Mod: CPTII,S$GLB,,

## 2023-06-16 PROCEDURE — 3008F PR BODY MASS INDEX (BMI) DOCUMENTED: ICD-10-PCS | Mod: CPTII,S$GLB,,

## 2023-06-16 PROCEDURE — 3077F PR MOST RECENT SYSTOLIC BLOOD PRESSURE >= 140 MM HG: ICD-10-PCS | Mod: CPTII,S$GLB,,

## 2023-06-16 PROCEDURE — 4010F PR ACE/ARB THEARPY RXD/TAKEN: ICD-10-PCS | Mod: CPTII,S$GLB,,

## 2023-06-16 RX ORDER — FLUOXETINE HYDROCHLORIDE 20 MG/1
20 CAPSULE ORAL DAILY
Qty: 30 CAPSULE | Refills: 11 | Status: SHIPPED | OUTPATIENT
Start: 2023-07-07 | End: 2024-07-06

## 2023-06-16 RX ORDER — FLUOXETINE 10 MG/1
10 CAPSULE ORAL DAILY
Qty: 21 CAPSULE | Refills: 0 | Status: SHIPPED | OUTPATIENT
Start: 2023-06-16 | End: 2023-07-07 | Stop reason: SDUPTHER

## 2023-06-16 RX ORDER — QUETIAPINE FUMARATE 100 MG/1
100 TABLET, FILM COATED ORAL NIGHTLY
Qty: 30 TABLET | Refills: 5 | Status: SHIPPED | OUTPATIENT
Start: 2023-06-16 | End: 2023-11-17 | Stop reason: SDUPTHER

## 2023-06-16 NOTE — PATIENT INSTRUCTIONS
Take prozac 10mg for 3 weeks and then start taking the 20mg capsules. Let us know after about 2 months if it is working.

## 2023-06-18 VITALS
WEIGHT: 315 LBS | BODY MASS INDEX: 47.86 KG/M2 | HEART RATE: 87 BPM | DIASTOLIC BLOOD PRESSURE: 78 MMHG | SYSTOLIC BLOOD PRESSURE: 140 MMHG

## 2023-06-18 PROBLEM — G47.00 INSOMNIA: Status: ACTIVE | Noted: 2023-06-18

## 2023-06-18 NOTE — PROGRESS NOTES
Subjective:       Patient ID: [id number]    Chief Complaint:   Chief Complaint   Patient presents with    Medication Refill       Nain Geoff Jain is a 42 y.o. M with MDD, DOROTHEA, insomnia that presents for medicaton refill/changes . Patient's PCP is Dr. Moeller. Patient is unaccompanied during this visit.    States sleep-onset insomnia is worsening and his 50mg seroquel nightly has not been working, started taking 100mg nightly instead for the past 3 months which works. No daytime somnolence, dystonia noted.    Requesting a different SSRI/SNRI as depression is not improving on venlafaxine. Discussed current 75mg dose, would rather try different medication instead of increasing dose. Patient has not been taking venlafaxine for about 3 months. Denies withdrawal symptoms.    Review of Systems   Constitutional:  Negative for chills, diaphoresis, fever and malaise/fatigue.   HENT:  Negative for congestion, sinus pain and sore throat.    Eyes:  Negative for pain, discharge and redness.   Respiratory:  Negative for cough, shortness of breath and wheezing.    Cardiovascular:  Negative for chest pain, palpitations and leg swelling.   Gastrointestinal:  Negative for abdominal pain, blood in stool, constipation, diarrhea, nausea and vomiting.   Genitourinary:  Negative for dysuria, frequency, hematuria and urgency.   Musculoskeletal:  Negative for back pain, joint pain, myalgias and neck pain.   Skin:  Negative for itching and rash.   Neurological:  Negative for dizziness, tremors, focal weakness and weakness.   Psychiatric/Behavioral:  The patient is nervous/anxious and has insomnia.        Objective:      Vitals:    06/18/23 1732   BP: (!) 140/78   Pulse: 87       Physical Exam  Vitals reviewed.   Constitutional:       Appearance: Normal appearance. He is not ill-appearing or diaphoretic.   HENT:      Head: Normocephalic and atraumatic.      Right Ear: External ear normal.      Left Ear: External ear normal.      Nose:  Nose normal.   Eyes:      General: No scleral icterus.     Extraocular Movements: Extraocular movements intact.      Conjunctiva/sclera: Conjunctivae normal.   Cardiovascular:      Rate and Rhythm: Normal rate and regular rhythm.      Pulses: Normal pulses.      Heart sounds: Normal heart sounds.   Pulmonary:      Effort: Pulmonary effort is normal.      Breath sounds: Normal breath sounds.   Abdominal:      General: Abdomen is flat. There is no distension.      Palpations: Abdomen is soft.      Tenderness: There is no abdominal tenderness.   Musculoskeletal:         General: No swelling or deformity. Normal range of motion.      Cervical back: Normal range of motion. No tenderness.      Right lower leg: No edema.      Left lower leg: No edema.   Lymphadenopathy:      Cervical: No cervical adenopathy.   Skin:     General: Skin is warm and dry.      Capillary Refill: Capillary refill takes less than 2 seconds.      Findings: No lesion.   Neurological:      General: No focal deficit present.      Mental Status: He is alert and oriented to person, place, and time.   Psychiatric:         Mood and Affect: Mood normal.         Behavior: Behavior normal.         Thought Content: Thought content normal.         Judgment: Judgment normal.         Assessment and Plan:       Severe episode of recurrent major depressive disorder, without psychotic features and Insomnia  Will increase seroquel to 100mg as patient has been taking for over 2 months at that dose and is tolerating well. Discussed EPS side effects.  Will start fluoxetine, no washout for venlafaxine needed as patient has not been taking for 3 months. Starting at 10mg for 3 weeks and then increase to 20mg after that. Will reassess efficacy at 6-8 weeks.  -     QUEtiapine (SEROQUEL) 100 MG Tab; Take 1 tablet (100 mg total) by mouth nightly.  Dispense: 30 tablet; Refill: 5  -     FLUoxetine 10 MG capsule; Take 1 capsule (10 mg total) by mouth once daily. for 21 days   Dispense: 21 capsule; Refill: 0  -     FLUoxetine 20 MG capsule; Take 1 capsule (20 mg total) by mouth once daily.  Dispense: 30 capsule; Refill: 11        Follow-up w PCP in 6-8 weeks    Case discussed with Dr. Tripp      Time spent today: > 20 minutes on H&P, MRR, and MDM        Keren Devlin MD  Medical Resident  [facility]

## 2023-07-10 RX ORDER — FLUOXETINE 10 MG/1
10 CAPSULE ORAL DAILY
Qty: 21 CAPSULE | Refills: 0 | Status: SHIPPED | OUTPATIENT
Start: 2023-07-10 | End: 2023-07-31

## 2023-08-04 RX ORDER — FLUOXETINE HYDROCHLORIDE 20 MG/1
20 CAPSULE ORAL DAILY
Qty: 30 CAPSULE | Refills: 11 | Status: CANCELLED | OUTPATIENT
Start: 2023-08-04 | End: 2024-08-03

## 2023-09-28 DIAGNOSIS — E78.5 HYPERLIPIDEMIA ASSOCIATED WITH TYPE 2 DIABETES MELLITUS: ICD-10-CM

## 2023-09-28 DIAGNOSIS — E11.69 HYPERLIPIDEMIA ASSOCIATED WITH TYPE 2 DIABETES MELLITUS: ICD-10-CM

## 2023-09-28 NOTE — TELEPHONE ENCOUNTER
LEFT VOICE MESSAGE NOTIFYING PT RX UPDATED AND ADVISED HIM TO CALL [facility] FOR STATUS   no PAST MEDICAL HISTORY:  Bilateral cataracts     Diverticulitis     Fibromyalgia     GERD (gastroesophageal reflux disease)     History of stomach ulcers     Kidney stone treated with lithotripsy    CALVIN on CPAP

## 2023-09-29 RX ORDER — ROSUVASTATIN CALCIUM 40 MG/1
40 TABLET, COATED ORAL NIGHTLY
Qty: 90 TABLET | Refills: 3 | Status: SHIPPED | OUTPATIENT
Start: 2023-09-29

## 2023-11-15 DIAGNOSIS — F33.2 SEVERE EPISODE OF RECURRENT MAJOR DEPRESSIVE DISORDER, WITHOUT PSYCHOTIC FEATURES: ICD-10-CM

## 2023-11-15 RX ORDER — QUETIAPINE FUMARATE 100 MG/1
100 TABLET, FILM COATED ORAL NIGHTLY
Qty: 30 TABLET | Refills: 5 | Status: CANCELLED | OUTPATIENT
Start: 2023-11-15 | End: 2024-05-13

## 2023-11-17 ENCOUNTER — PATIENT MESSAGE (OUTPATIENT)
Dept: INTERNAL MEDICINE | Facility: CLINIC | Age: 43
End: 2023-11-17
Payer: COMMERCIAL

## 2023-11-17 DIAGNOSIS — F33.2 SEVERE EPISODE OF RECURRENT MAJOR DEPRESSIVE DISORDER, WITHOUT PSYCHOTIC FEATURES: ICD-10-CM

## 2023-11-17 RX ORDER — QUETIAPINE FUMARATE 100 MG/1
100 TABLET, FILM COATED ORAL NIGHTLY
Qty: 30 TABLET | Refills: 0 | Status: SHIPPED | OUTPATIENT
Start: 2023-11-17 | End: 2024-05-15

## 2023-11-17 NOTE — TELEPHONE ENCOUNTER
2nd request. The patient is needing his medication refilled if possible.  If needing an appointment please reach out to the patient.    Thanks

## 2023-11-17 NOTE — TELEPHONE ENCOUNTER
----- Message from Aimee Cervantes sent at 11/17/2023 10:04 AM CST -----  Contact: [phone]  Requesting an RX refill or new RX.  Is this a refill or new RX: refill  RX name and strength   QUEtiapine (SEROQUEL) 100 MG Tab 30 tablet  Is this a 30 day or 90 day RX: 30  Pharmacy name and phone #   [id number] - [place] [address] [address]  Phone: [phone]  Fax: [phone]      Patient has been trying over a week to get this medication filled.. Patient was seen in June 2023 and he is out of his medication, please fill and call patient to confirm. It is the weekend.     ATTN: Keren Devlin MD.... Agustin Moeller MD

## 2024-04-08 DIAGNOSIS — F33.2 SEVERE EPISODE OF RECURRENT MAJOR DEPRESSIVE DISORDER, WITHOUT PSYCHOTIC FEATURES: ICD-10-CM

## 2024-04-08 RX ORDER — QUETIAPINE FUMARATE 100 MG/1
100 TABLET, FILM COATED ORAL NIGHTLY
Qty: 30 TABLET | Refills: 0 | Status: SHIPPED | OUTPATIENT
Start: 2024-04-08 | End: 2024-05-03 | Stop reason: SDUPTHER

## 2024-05-03 DIAGNOSIS — F33.2 SEVERE EPISODE OF RECURRENT MAJOR DEPRESSIVE DISORDER, WITHOUT PSYCHOTIC FEATURES: ICD-10-CM

## 2024-05-03 RX ORDER — QUETIAPINE FUMARATE 100 MG/1
100 TABLET, FILM COATED ORAL NIGHTLY
Qty: 30 TABLET | Refills: 0 | Status: SHIPPED | OUTPATIENT
Start: 2024-05-03 | End: 2024-06-04 | Stop reason: SDUPTHER

## 2024-05-03 RX ORDER — VALSARTAN 320 MG/1
320 TABLET ORAL DAILY
Qty: 90 TABLET | Refills: 0 | Status: SHIPPED | OUTPATIENT
Start: 2024-05-03

## 2024-05-05 RX ORDER — FLUOXETINE HYDROCHLORIDE 20 MG/1
20 CAPSULE ORAL DAILY
Qty: 30 CAPSULE | Refills: 11 | Status: SHIPPED | OUTPATIENT
Start: 2024-05-05 | End: 2025-05-05

## 2024-05-14 ENCOUNTER — OFFICE VISIT (OUTPATIENT)
Dept: OPTOMETRY | Facility: CLINIC | Age: 44
End: 2024-05-14
Payer: COMMERCIAL

## 2024-05-14 DIAGNOSIS — Z01.00 ROUTINE EYE EXAM: Primary | ICD-10-CM

## 2024-05-14 DIAGNOSIS — E11.9 TYPE 2 DIABETES MELLITUS WITHOUT RETINOPATHY: ICD-10-CM

## 2024-05-14 DIAGNOSIS — H31.8 CHOROIDAL LESION: ICD-10-CM

## 2024-05-14 DIAGNOSIS — B00.52 HERPES KERATITIS: ICD-10-CM

## 2024-05-14 DIAGNOSIS — H52.4 PRESBYOPIA: ICD-10-CM

## 2024-05-14 PROCEDURE — 4010F ACE/ARB THERAPY RXD/TAKEN: CPT | Mod: CPTII,S$GLB,, | Performed by: OPTOMETRIST

## 2024-05-14 PROCEDURE — 99999 PR PBB SHADOW E&M-EST. PATIENT-LVL II: CPT | Mod: PBBFAC,,, | Performed by: OPTOMETRIST

## 2024-05-14 PROCEDURE — 92014 COMPRE OPH EXAM EST PT 1/>: CPT | Mod: S$GLB,,, | Performed by: OPTOMETRIST

## 2024-05-14 PROCEDURE — 1159F MED LIST DOCD IN RCRD: CPT | Mod: CPTII,S$GLB,, | Performed by: OPTOMETRIST

## 2024-05-14 PROCEDURE — 2023F DILAT RTA XM W/O RTNOPTHY: CPT | Mod: CPTII,S$GLB,, | Performed by: OPTOMETRIST

## 2024-05-14 RX ORDER — ACYCLOVIR 400 MG/1
400 TABLET ORAL 2 TIMES DAILY
Qty: 60 TABLET | Refills: 0 | Status: SHIPPED | OUTPATIENT
Start: 2024-05-14 | End: 2024-06-13

## 2024-05-14 NOTE — PROGRESS NOTES
HPI    Patient here for routine eye exam. Pt states he is doing well with his   eyes now. Last flare of herpes keratitis was about 1 month ago per pt. Pt   using acyclovir prn for flare ups and needs refill.     (+)DM  Hemoglobin A1C       Date                     Value               Ref Range             Status                03/17/2022               7.3 (H)             4.0 - 5.6 %           Final                  04/06/2021               8.0 (H)             4.0 - 5.6 %           Final                  10/14/2020               8.0 (H)             4.0 - 5.6 %           Final              Last edited by Shelli Rodarte, OD on 5/14/2024 11:13 AM.            Assessment /Plan     For exam results, see Encounter Report.    Routine eye exam    Presbyopia    Herpes keratitis  -     acyclovir (ZOVIRAX) 400 MG tablet; Take 1 tablet (400 mg total) by mouth 2 (two) times daily.  Dispense: 60 tablet; Refill: 0    Type 2 diabetes mellitus without retinopathy    Choroidal lesion      [facility] Exam.     2. Continue use of OTC reading glasses prn. Monitor yearly.      3. H/o HSV keratitis OD. Recent flare up last month (per patient). Stressed to pt that he needs to f/u with eyecare provider if/when he has a flare-up. No dendrite or corneal edema noted on exam today. Uncorrected VA OD 20/25. Patient to re-start Acyclovir 400mg po BID for prophylactic purposes. Refills ordered. If flare up occurs, pt needs to RTC ASAP for corneal eval. Monitor yearly unless symptoms occur sooner.     4. No retinopathy noted, OU. Continue proper BS control and annual diabetic eye exams. Monitor yearly.      5. Longstanding, not visually significant. Monitor yearly.      RTC in 1 year for annual eye exam or sooner if needed.

## 2024-06-03 DIAGNOSIS — F33.2 SEVERE EPISODE OF RECURRENT MAJOR DEPRESSIVE DISORDER, WITHOUT PSYCHOTIC FEATURES: ICD-10-CM

## 2024-06-03 RX ORDER — QUETIAPINE FUMARATE 100 MG/1
100 TABLET, FILM COATED ORAL NIGHTLY
Qty: 30 TABLET | Refills: 0 | OUTPATIENT
Start: 2024-06-03 | End: 2024-11-30

## 2024-06-03 NOTE — TELEPHONE ENCOUNTER
Hi, please call the patient and explain that the patient's resident doctor has graduated, Agustin Kirk MD (Inactive).  Please offer an appt with a new resident doctor.  He needs to be seen in order to be evaluated for the refills he is requesting.  Thank you, Benedicto Newton

## 2024-06-04 NOTE — TELEPHONE ENCOUNTER
Hi , I set up an appointment for Thursday with Dr Mathews   On Thursday   Would you please give him 1 refill   He states he really needs the prescription

## 2024-06-04 NOTE — PROGRESS NOTES
[facility]  CLINIC NOTE    Patient Name: Nain Jain  YOB: 1980    PRESENTING HISTORY     History of Present Illness:  Mr. Nain Jain is a 43 y.o. male with HTN, HLD, T2DM, depression, and WALTER presenting for medication refills. Last seen in July 2023. Started on fluoxetine and increased seroquel dose at that time. Takes the seroquel nightly and reports its been working well for his insomnia. Uses a CPAP but doesn't currently have a sleep medicine doctor. Compliant with all medications. Initial BP today elevated, repeat improved; has a BP cuff machine but doesn't measure his BP at home. Discussed importance of measuring BP daily. Eats a lot of salt and fried food. Not exercising regularly.     Never smoker  Alcohol 5-6 drinks per week; discussed reducing alcohol and having 2x alcohol free days per week    Review of Systems   Constitutional:  Negative for fever and weight loss.   Respiratory:  Negative for cough, shortness of breath and wheezing.    Cardiovascular:  Negative for chest pain, palpitations and leg swelling.   Gastrointestinal:  Negative for abdominal pain, nausea and vomiting.   Genitourinary: Negative.    Musculoskeletal:  Negative for joint pain and myalgias.   Neurological:  Negative for dizziness, weakness and headaches.     PAST HISTORY:     Past Medical History:   Diagnosis Date    BMI 50.0-59.9, adult     Diabetes mellitus     Hyperlipidemia     Hypertension     Morbid obesity     Sleep apnea      Past Surgical History:   Procedure Laterality Date    EXCISION OF MASS OF ABDOMEN N/A 4/22/2019    Procedure: EXCISION, MASS, ABDOMEN;  Surgeon: Pj Saucedo MD;  Location: [facility] OR;  Service: General;  Laterality: N/A;    NO PAST SURGERIES       Family History   Problem Relation Name Age of Onset    Diabetes Mother      Obesity Mother      Hypertension Mother      Diabetes Maternal Grandmother      COPD Father          smoker    No  Known Problems Sister      No Known Problems Brother      No Known Problems Brother      No Known Problems Sister      No Known Problems Sister       Social History     Socioeconomic History    Marital status: Single   Occupational History    Occupation:    Tobacco Use    Smoking status: Never    Smokeless tobacco: Never   Substance and Sexual Activity    Alcohol use: Yes     Comment: 2 bottles of wine a week - only drinks when he's off.    Drug use: No   Other Topics Concern    Patient feels they ought to cut down on drinking/drug use No    Patient annoyed by others criticizing their drinking/drug use No    Patient has felt bad or guilty about drinking/drug use No    Patient has had a drink/used drugs as an eye opener in the AM No   Social History Narrative    Lives w/wife and 1 child.       MEDICATIONS & ALLERGIES:     Current Outpatient Medications on File Prior to Visit   Medication Sig    acyclovir (ZOVIRAX) 400 MG tablet Take 1 tablet (400 mg total) by mouth 2 (two) times daily.    FLUoxetine 20 MG capsule Take 1 capsule (20 mg total) by mouth once daily.    glipiZIDE (GLUCOTROL) 5 MG TR24 Take 2 tablets (10 mg total) by mouth daily with breakfast.    hydroCHLOROthiazide (HYDRODIURIL) 25 MG tablet Take 1 tablet (25 mg total) by mouth once daily.    metFORMIN (GLUCOPHAGE-XR) 500 MG ER 24hr tablet Take 2 tablets (1,000 mg total) by mouth 2 (two) times daily with meals.    QUEtiapine (SEROQUEL) 100 MG Tab Take 1 tablet (100 mg total) by mouth nightly.    rosuvastatin (CRESTOR) 40 MG Tab Take 1 tablet (40 mg total) by mouth every evening.    valsartan (DIOVAN) 320 MG tablet Take 1 tablet (320 mg total) by mouth once daily.     No current facility-administered medications on file prior to visit.     Review of patient's allergies indicates:  No Known Allergies    OBJECTIVE:   Vital Signs:  Vitals:    06/06/24 0817 06/06/24 0825   BP: (!) 143/95 (!) 130/90   Pulse: 77    Temp: 98.3 °F (36.8 °C)    Weight:  "(!) 145 kg (319 lb 10.7 oz)    Height: 5' 9" (1.753 m)      No results found for this or any previous visit (from the past 24 hour(s)).      Physical Exam  Vitals reviewed.   Constitutional:       General: He is not in acute distress.     Appearance: Normal appearance. He is obese. He is not ill-appearing.   HENT:      Head: Normocephalic and atraumatic.      Mouth/Throat:      Mouth: Mucous membranes are moist.   Eyes:      Conjunctiva/sclera: Conjunctivae normal.   Cardiovascular:      Rate and Rhythm: Normal rate and regular rhythm.      Pulses: Normal pulses.      Heart sounds: Normal heart sounds. No murmur heard.  Pulmonary:      Effort: Pulmonary effort is normal.      Breath sounds: Normal breath sounds. No wheezing, rhonchi or rales.   Abdominal:      General: Bowel sounds are normal.      Palpations: Abdomen is soft.      Tenderness: There is no abdominal tenderness. There is no guarding.   Musculoskeletal:         General: No tenderness.      Right lower leg: No edema.      Left lower leg: No edema.   Skin:     General: Skin is warm.      Capillary Refill: Capillary refill takes less than 2 seconds.      Findings: No erythema or rash.   Neurological:      General: No focal deficit present.      Mental Status: He is alert and oriented to person, place, and time.   Psychiatric:         Mood and Affect: Mood normal.         Thought Content: Thought content normal.     ASSESSMENT & PLAN:     Diagnoses and all orders for this visit:    Annual physical exam    Encounter for screening colonoscopy  -     Ambulatory referral/consult to Endo Procedure ; Future    Type 2 diabetes mellitus with other specified complication, without long-term current use of insulin  -     CBC W/ AUTO DIFFERENTIAL; Future  -     COMPREHENSIVE METABOLIC PANEL; Future  -     HEMOGLOBIN A1C; Future  -     LIPID PANEL; Future  -     Cancel: Microalbumin/creatinine urine ratio  -     Cancel: Creatinine, urine, random  -     " Ambulatory referral/consult to Podiatry; Future  -     Microalbumin/creatinine urine ratio; Future  -     Creatinine, urine, random; Future    Hyperlipidemia associated with type 2 diabetes mellitus    Hypertension associated with diabetes    Severe episode of recurrent major depressive disorder, without psychotic features  -     QUEtiapine (SEROQUEL) 100 MG Tab; Take 1 tablet (100 mg total) by mouth nightly.  -     TSH; Future    Presenting for medication refill. Feeling well, no other complaints.   Only needs seroquel refilled; discussed potential need to change this medication/referral to sleep medicine if his TSH is abnormal or his diabetes is not controlled, as this medication can affect both  Due for basic labs, HbA1c, lipid panel, TSH, & diabetic urine screening  Saw the optometrist a couple weeks ago for diabetic eye exam but needs a podiatrist, referral placed  Also due for screening colonoscopy, referred to endo   Discussed measuring his BP daily and keeping a log; instructed him to bring this to his next appointment. Discussed importance of weight loss, low sodium diet, reduced alcohol intake as well as exercise several times per week. Provided information on DASH diet    Discussed with Dr. Maco WHITTEN in 2-3 months     Venecia Mathews MD  Internal Medicine PGY-3  [facility]

## 2024-06-05 RX ORDER — QUETIAPINE FUMARATE 100 MG/1
100 TABLET, FILM COATED ORAL NIGHTLY
Qty: 30 TABLET | Refills: 0 | Status: SHIPPED | OUTPATIENT
Start: 2024-06-05 | End: 2024-06-06 | Stop reason: SDUPTHER

## 2024-06-06 ENCOUNTER — OFFICE VISIT (OUTPATIENT)
Dept: INTERNAL MEDICINE | Facility: CLINIC | Age: 44
End: 2024-06-06
Payer: COMMERCIAL

## 2024-06-06 VITALS
SYSTOLIC BLOOD PRESSURE: 130 MMHG | WEIGHT: 315 LBS | HEART RATE: 77 BPM | DIASTOLIC BLOOD PRESSURE: 90 MMHG | BODY MASS INDEX: 46.65 KG/M2 | HEIGHT: 69 IN | TEMPERATURE: 98 F

## 2024-06-06 DIAGNOSIS — E11.59 HYPERTENSION ASSOCIATED WITH DIABETES: ICD-10-CM

## 2024-06-06 DIAGNOSIS — E11.69 TYPE 2 DIABETES MELLITUS WITH OTHER SPECIFIED COMPLICATION, WITHOUT LONG-TERM CURRENT USE OF INSULIN: ICD-10-CM

## 2024-06-06 DIAGNOSIS — I15.2 HYPERTENSION ASSOCIATED WITH DIABETES: ICD-10-CM

## 2024-06-06 DIAGNOSIS — E11.69 HYPERLIPIDEMIA ASSOCIATED WITH TYPE 2 DIABETES MELLITUS: ICD-10-CM

## 2024-06-06 DIAGNOSIS — F33.2 SEVERE EPISODE OF RECURRENT MAJOR DEPRESSIVE DISORDER, WITHOUT PSYCHOTIC FEATURES: ICD-10-CM

## 2024-06-06 DIAGNOSIS — Z00.00 ANNUAL PHYSICAL EXAM: Primary | ICD-10-CM

## 2024-06-06 DIAGNOSIS — Z12.11 ENCOUNTER FOR SCREENING COLONOSCOPY: ICD-10-CM

## 2024-06-06 DIAGNOSIS — E78.5 HYPERLIPIDEMIA ASSOCIATED WITH TYPE 2 DIABETES MELLITUS: ICD-10-CM

## 2024-06-06 PROCEDURE — 3080F DIAST BP >= 90 MM HG: CPT | Mod: CPTII,S$GLB,,

## 2024-06-06 PROCEDURE — 3075F SYST BP GE 130 - 139MM HG: CPT | Mod: CPTII,S$GLB,,

## 2024-06-06 PROCEDURE — 3008F BODY MASS INDEX DOCD: CPT | Mod: CPTII,S$GLB,,

## 2024-06-06 PROCEDURE — 99213 OFFICE O/P EST LOW 20 MIN: CPT | Mod: S$GLB,,,

## 2024-06-06 PROCEDURE — 4010F ACE/ARB THERAPY RXD/TAKEN: CPT | Mod: CPTII,S$GLB,,

## 2024-06-06 PROCEDURE — 99999 PR PBB SHADOW E&M-EST. PATIENT-LVL IV: CPT | Mod: PBBFAC,,,

## 2024-06-06 RX ORDER — QUETIAPINE FUMARATE 100 MG/1
100 TABLET, FILM COATED ORAL NIGHTLY
Qty: 30 TABLET | Refills: 3 | Status: SHIPPED | OUTPATIENT
Start: 2024-06-06 | End: 2024-12-03

## 2024-06-06 NOTE — PATIENT INSTRUCTIONS
Please have you lab work completed when you are fasted. We will contact you with the results  We have refilled your seroquel and sent it to your pharmacy  We have referred you to poditary for a diabetic foot exam  We have referred you for a colonoscopy.  Please measure your blood pressure daily and keep a log. Bring this log with you to your next appointment. You may need an adjustment in your blood pressure medications. We have provided information on a low sodium diet. Reducing the amount of salt and alcohol in your diet will help lower your blood pressure as will losing weight.   Follow-up in 3 months or sooner if needed

## 2024-06-07 NOTE — PROGRESS NOTES
I have reviewed the notes, assessments, and/or procedures performed by the resident, I concur with her/his documentation of Nain Jain.  Date of Service: 6/6/2024

## 2024-07-24 ENCOUNTER — HOSPITAL ENCOUNTER (EMERGENCY)
Facility: OTHER | Age: 44
Discharge: HOME | End: 2024-07-24
Attending: EMERGENCY MEDICINE
Payer: COMMERCIAL

## 2024-07-24 VITALS
HEIGHT: 69 IN | RESPIRATION RATE: 20 BRPM | WEIGHT: 315 LBS | TEMPERATURE: 99 F | SYSTOLIC BLOOD PRESSURE: 179 MMHG | DIASTOLIC BLOOD PRESSURE: 103 MMHG | HEART RATE: 106 BPM | OXYGEN SATURATION: 95 % | BODY MASS INDEX: 46.65 KG/M2

## 2024-07-24 DIAGNOSIS — E11.65 TYPE 2 DIABETES MELLITUS WITH HYPERGLYCEMIA, WITHOUT LONG-TERM CURRENT USE OF INSULIN: Primary | ICD-10-CM

## 2024-07-24 DIAGNOSIS — E11.69 HYPERLIPIDEMIA ASSOCIATED WITH TYPE 2 DIABETES MELLITUS: ICD-10-CM

## 2024-07-24 DIAGNOSIS — E66.9 DIABETES MELLITUS TYPE 2 IN OBESE: ICD-10-CM

## 2024-07-24 DIAGNOSIS — E11.59 HYPERTENSION ASSOCIATED WITH DIABETES: ICD-10-CM

## 2024-07-24 DIAGNOSIS — K64.9 HEMORRHOIDS, UNSPECIFIED HEMORRHOID TYPE: ICD-10-CM

## 2024-07-24 DIAGNOSIS — R03.0 ELEVATED BLOOD PRESSURE READING: ICD-10-CM

## 2024-07-24 DIAGNOSIS — E78.5 HYPERLIPIDEMIA ASSOCIATED WITH TYPE 2 DIABETES MELLITUS: ICD-10-CM

## 2024-07-24 DIAGNOSIS — K62.5 RECTAL BLEEDING: ICD-10-CM

## 2024-07-24 DIAGNOSIS — K62.89 ANAL PAIN: ICD-10-CM

## 2024-07-24 DIAGNOSIS — I15.2 HYPERTENSION ASSOCIATED WITH DIABETES: ICD-10-CM

## 2024-07-24 DIAGNOSIS — E11.69 DIABETES MELLITUS TYPE 2 IN OBESE: ICD-10-CM

## 2024-07-24 LAB
ALBUMIN SERPL BCP-MCNC: 4.3 G/DL (ref 3.5–5.2)
ALP SERPL-CCNC: 96 U/L (ref 55–135)
ALT SERPL W/O P-5'-P-CCNC: 22 U/L (ref 10–44)
ANION GAP SERPL CALC-SCNC: 18 MMOL/L (ref 8–16)
AST SERPL-CCNC: 21 U/L (ref 10–40)
BASOPHILS # BLD AUTO: 0.04 K/UL (ref 0–0.2)
BASOPHILS NFR BLD: 0.5 % (ref 0–1.9)
BILIRUB SERPL-MCNC: 0.4 MG/DL (ref 0.1–1)
BUN SERPL-MCNC: 10 MG/DL (ref 6–20)
CALCIUM SERPL-MCNC: 9.6 MG/DL (ref 8.7–10.5)
CHLORIDE SERPL-SCNC: 99 MMOL/L (ref 95–110)
CO2 SERPL-SCNC: 15 MMOL/L (ref 23–29)
CREAT SERPL-MCNC: 1 MG/DL (ref 0.5–1.4)
DIFFERENTIAL METHOD BLD: ABNORMAL
EOSINOPHIL # BLD AUTO: 0.1 K/UL (ref 0–0.5)
EOSINOPHIL NFR BLD: 1.4 % (ref 0–8)
ERYTHROCYTE [DISTWIDTH] IN BLOOD BY AUTOMATED COUNT: 12.1 % (ref 11.5–14.5)
EST. GFR  (NO RACE VARIABLE): >60 ML/MIN/1.73 M^2
GLUCOSE SERPL-MCNC: 352 MG/DL (ref 70–110)
HCT VFR BLD AUTO: 43.1 % (ref 40–54)
HGB BLD-MCNC: 15.2 G/DL (ref 14–18)
IMM GRANULOCYTES # BLD AUTO: 0.04 K/UL (ref 0–0.04)
IMM GRANULOCYTES NFR BLD AUTO: 0.5 % (ref 0–0.5)
LYMPHOCYTES # BLD AUTO: 3.7 K/UL (ref 1–4.8)
LYMPHOCYTES NFR BLD: 50.1 % (ref 18–48)
MCH RBC QN AUTO: 32.6 PG (ref 27–31)
MCHC RBC AUTO-ENTMCNC: 35.3 G/DL (ref 32–36)
MCV RBC AUTO: 93 FL (ref 82–98)
MONOCYTES # BLD AUTO: 0.5 K/UL (ref 0.3–1)
MONOCYTES NFR BLD: 6.5 % (ref 4–15)
NEUTROPHILS # BLD AUTO: 3 K/UL (ref 1.8–7.7)
NEUTROPHILS NFR BLD: 41 % (ref 38–73)
NRBC BLD-RTO: 0 /100 WBC
PLATELET # BLD AUTO: 344 K/UL (ref 150–450)
PMV BLD AUTO: 8.9 FL (ref 9.2–12.9)
POTASSIUM SERPL-SCNC: 4.2 MMOL/L (ref 3.5–5.1)
PROT SERPL-MCNC: 8.6 G/DL (ref 6–8.4)
RBC # BLD AUTO: 4.66 M/UL (ref 4.6–6.2)
SODIUM SERPL-SCNC: 132 MMOL/L (ref 136–145)
WBC # BLD AUTO: 7.34 K/UL (ref 3.9–12.7)

## 2024-07-24 PROCEDURE — 80053 COMPREHEN METABOLIC PANEL: CPT | Performed by: PHYSICIAN ASSISTANT

## 2024-07-24 PROCEDURE — 99283 EMERGENCY DEPT VISIT LOW MDM: CPT

## 2024-07-24 PROCEDURE — 25000003 PHARM REV CODE 250: Performed by: EMERGENCY MEDICINE

## 2024-07-24 PROCEDURE — 85025 COMPLETE CBC W/AUTO DIFF WBC: CPT | Performed by: PHYSICIAN ASSISTANT

## 2024-07-24 RX ORDER — ROSUVASTATIN CALCIUM 40 MG/1
40 TABLET, COATED ORAL NIGHTLY
Qty: 90 TABLET | Refills: 3 | Status: SHIPPED | OUTPATIENT
Start: 2024-07-24

## 2024-07-24 RX ORDER — GLIPIZIDE 5 MG/1
10 TABLET, FILM COATED, EXTENDED RELEASE ORAL
Qty: 180 TABLET | Refills: 3 | Status: SHIPPED | OUTPATIENT
Start: 2024-07-24 | End: 2025-07-24

## 2024-07-24 RX ORDER — HYDROCHLOROTHIAZIDE 25 MG/1
25 TABLET ORAL DAILY
Qty: 90 TABLET | Refills: 3 | Status: SHIPPED | OUTPATIENT
Start: 2024-07-24 | End: 2025-07-24

## 2024-07-24 RX ORDER — METFORMIN HYDROCHLORIDE 500 MG/1
1000 TABLET, EXTENDED RELEASE ORAL 2 TIMES DAILY WITH MEALS
Qty: 360 TABLET | Refills: 3 | Status: SHIPPED | OUTPATIENT
Start: 2024-07-24 | End: 2025-07-24

## 2024-07-24 RX ORDER — HYDROCORTISONE ACETATE 25 MG/1
25 SUPPOSITORY RECTAL
Status: COMPLETED | OUTPATIENT
Start: 2024-07-24 | End: 2024-07-24

## 2024-07-24 RX ORDER — POLYETHYLENE GLYCOL 3350 17 G/17G
17 POWDER, FOR SOLUTION ORAL DAILY PRN
Qty: 10 EACH | Refills: 2 | Status: SHIPPED | OUTPATIENT
Start: 2024-07-24

## 2024-07-24 RX ORDER — AMOXICILLIN 250 MG
CAPSULE ORAL
Qty: 60 TABLET | Refills: 2 | Status: SHIPPED | OUTPATIENT
Start: 2024-07-24

## 2024-07-24 RX ORDER — HYDROCORTISONE ACETATE 25 MG/1
25 SUPPOSITORY RECTAL 2 TIMES DAILY
Qty: 24 SUPPOSITORY | Refills: 2 | Status: SHIPPED | OUTPATIENT
Start: 2024-07-24

## 2024-07-24 RX ADMIN — HYDROCORTISONE ACETATE 25 MG: 25 SUPPOSITORY RECTAL at 10:07

## 2024-07-24 NOTE — Clinical Note
"Nain"Dale Jain was seen and treated in our emergency department on 7/24/2024.  He may return to work on 07/31/2024.  Patient may return to work sooner if he feels able.     If you have any questions or concerns, please don't hesitate to call.      Ada Arzola MD"

## 2024-07-25 NOTE — FIRST PROVIDER EVALUATION
Emergency Department TeleTriage Encounter Note      CHIEF COMPLAINT    Chief Complaint   Patient presents with    Rectal Bleeding     Pt has rectal pain and intermittent bleeding for about 2 months  Rates pain 10/10  Pt sits 12-16 hrs per day for work       VITAL SIGNS   Initial Vitals [07/24/24 2030]   BP Pulse Resp Temp SpO2   (!) 191/107 109 20 98.2 °F (36.8 °C) 97 %      MAP       --            ALLERGIES    Review of patient's allergies indicates:  No Known Allergies    PROVIDER TRIAGE NOTE  42 yo M to the ED with rectal pain. Pain x 2 months, worse the past few days. Now having rectal bleeding.       ORDERS  Labs Reviewed - No data to display    ED Orders (720h ago, onward)      None              Virtual Visit Note: The provider triage portion of this emergency department evaluation and documentation was performed via [employer], a HIPAA-compliant telemedicine application, in concert with a tele-presenter in the room. A face to face patient evaluation with one of my colleagues will occur once the patient is placed in an emergency department room.      DISCLAIMER: This note was prepared with [employer] voice recognition transcription software. Garbled syntax, mangled pronouns, and other bizarre constructions may be attributed to that software system.

## 2024-07-25 NOTE — ED PROVIDER NOTES
Encounter Date: 7/24/2024       History     Chief Complaint   Patient presents with    Rectal Bleeding     Pt has rectal pain and intermittent bleeding for about 2 months  Rates pain 10/10  Pt sits 12-16 hrs per day for work     44 yo male with DM2, HTN, DLD, WALTER on CPAP presents via personal transportation to [facility] with rectal pain and bleeding.  Patient reports waxing/waning rectal pain and bleeding for 2 months.  More recently however pain has been constant.  Patient states pain is now always 8/10, but when he defecates, it is 15/10.  He reports bright red blood in toilet and on toilet paper when he defecates.  Patient cannot palpate any masses externally.  Patient has been using cream externally -- Cortisone at first, then 2 days of Preparation H extra strength.  He has also tried applying witch hazel to his anus.  No abdominal pain, no nausea/vomiting.      Patient typically has a BM every day.  He has to strain sometimes (like when he eats more chicken/sausage/etc than salad).      Patient works as a  ([employer]) and sits for 12-14 hours day.     Patient has a preop with endoscopy 8/9/24 to schedule screening colonoscopy.    Patient states he ran out of his DM2 meds 1 mo ago.      PMH: HTN, HLD, T2DM, and WALTER on CPAP  Psych: depression        Review of patient's allergies indicates:  No Known Allergies  Past Medical History:   Diagnosis Date    BMI 50.0-59.9, adult     Diabetes mellitus     Hyperlipidemia     Hypertension     Morbid obesity     Sleep apnea      Past Surgical History:   Procedure Laterality Date    EXCISION OF MASS OF ABDOMEN N/A 4/22/2019    Procedure: EXCISION, MASS, ABDOMEN;  Surgeon: Pj Saucedo MD;  Location: [facility];  Service: General;  Laterality: N/A;    NO PAST SURGERIES       Family History   Problem Relation Name Age of Onset    Diabetes Mother      Obesity Mother      Hypertension Mother      Diabetes Maternal Grandmother      COPD Father          smoker     No Known Problems Sister      No Known Problems Brother      No Known Problems Brother      No Known Problems Sister      No Known Problems Sister       Social History     Tobacco Use    Smoking status: Never    Smokeless tobacco: Never   Substance Use Topics    Alcohol use: Yes     Comment: 2 bottles of wine a week - only drinks when he's off.    Drug use: No     Review of Systems   Constitutional:  Negative for fever.   HENT:  Negative for rhinorrhea and sore throat.    Eyes:  Negative for visual disturbance.   Respiratory:  Negative for shortness of breath.    Cardiovascular:  Negative for chest pain.   Gastrointestinal:  Positive for anal bleeding and rectal pain. Negative for abdominal pain.   Genitourinary:  Negative for dysuria.   Musculoskeletal:  Negative for gait problem.   Skin:  Negative for rash.   Neurological:  Negative for syncope.       Physical Exam     Initial Vitals [07/24/24 2030]   BP Pulse Resp Temp SpO2   (!) 191/107 109 20 98.2 °F (36.8 °C) 97 %      MAP       --         Physical Exam    Nursing note and vitals reviewed.  Constitutional: He appears well-developed and well-nourished. He is not diaphoretic.   Awake, alert, nontoxic, speaking in complete sentences. Body mass index is 46.96 kg/m².   HENT:   Head: Normocephalic and atraumatic.   Mouth/Throat: Oropharynx is clear and moist.   Eyes: Conjunctivae and EOM are normal. Pupils are equal, round, and reactive to light.   Neck: Neck supple.   Normal range of motion.  Cardiovascular:  Normal rate, regular rhythm and intact distal pulses.           Pulmonary/Chest: Breath sounds normal. No respiratory distress. He has no wheezes. He has no rhonchi. He has no rales.   Abdominal: Abdomen is soft. There is no abdominal tenderness.   Genitourinary:    Genitourinary Comments: STACEY chaperoned by BEKAH Goodwin.  No external anal lesions.  Patient had exquisite TTP on attempted STACEY -- ?internal hemorrhoid.  Patient grabbed examiner's hand and did  not tolerate further exam.     Musculoskeletal:         General: No tenderness or edema. Normal range of motion.      Cervical back: Normal range of motion and neck supple.     Neurological: He is alert and oriented to person, place, and time.   Moving all extremities   Skin: Skin is warm and dry.   Psychiatric: He has a normal mood and affect.         ED Course   Procedures  Labs Reviewed   CBC W/ AUTO DIFFERENTIAL - Abnormal       Result Value    WBC 7.34      RBC 4.66      Hemoglobin 15.2      Hematocrit 43.1      MCV 93      MCH 32.6 (*)     MCHC 35.3      RDW 12.1      Platelets 344      MPV 8.9 (*)     Immature Granulocytes 0.5      Gran # (ANC) 3.0      Immature Grans (Abs) 0.04      Lymph # 3.7      Mono # 0.5      Eos # 0.1      Baso # 0.04      nRBC 0      Gran % 41.0      Lymph % 50.1 (*)     Mono % 6.5      Eosinophil % 1.4      Basophil % 0.5      Differential Method Automated     COMPREHENSIVE METABOLIC PANEL - Abnormal    Sodium 132 (*)     Potassium 4.2      Chloride 99      CO2 15 (*)     Glucose 352 (*)     BUN 10      Creatinine 1.0      Calcium 9.6      Total Protein 8.6 (*)     Albumin 4.3      Total Bilirubin 0.4      Alkaline Phosphatase 96      AST 21      ALT 22      eGFR >60      Anion Gap 18 (*)           Imaging Results    None          Medications   hydrocortisone suppository 25 mg (25 mg Rectal Given 7/24/24 2215)     Medical Decision Making  43 y.o. male with 2 months of anal pain and bleeding, worse with BMs.     Ddx includes external hemorrhoid, internal hemorrhoid, anal fissure, malignancy, other.    Exam suggestive of internal hemorrhoids though limited by pain.  No skin changes, fluctuance, etc to suggest perianal/perirectal abscess, fistula, etc.    CBC reassuring.  Hgb not suggestive of significant blood loss.  Hyperglycemia noted but patient has been off DM2 meds for 1 month.    I discussed with patient that he needs to use medication internally rather than externally.  I have  rx'ed hydrocortisone and phenylephrine-cocoa butter suppositories.  I have also rx'ed bowel regimen.  I have suggested patient purchase a donut pillow for use at work.    Patient has f/u in a few weeks to schedule outpatient colonoscopy and I have stressed importance of keeping this appointment.    I have refilled DM2 meds, statin, and HCTZ.    Work note provided.  D/c'ed in no acute distress.      Risk  OTC drugs.  Prescription drug management.                                      Clinical Impression:  Final diagnoses:  [E11.65] Type 2 diabetes mellitus with hyperglycemia, without long-term current use of insulin (Primary)  [K64.9] Hemorrhoids, unspecified hemorrhoid type  [K62.5] Rectal bleeding  [K62.89] Anal pain  [R03.0] Elevated blood pressure reading          ED Disposition Condition    Discharge Stable          ED Prescriptions       Medication Sig Dispense Start Date End Date Auth. Provider    metFORMIN (GLUCOPHAGE-XR) 500 MG ER 24hr tablet Take 2 tablets (1,000 mg total) by mouth 2 (two) times daily with meals. 360 tablet 7/24/2024 7/24/2025 Ada Arzola MD    glipiZIDE 5 MG TR24 Take 2 tablets (10 mg total) by mouth daily with breakfast. 180 tablet 7/24/2024 7/24/2025 Ada Arzola MD    rosuvastatin (CRESTOR) 40 MG Tab Take 1 tablet (40 mg total) by mouth every evening. 90 tablet 7/24/2024 -- Ada Arzola MD    hydroCHLOROthiazide (HYDRODIURIL) 25 MG tablet Take 1 tablet (25 mg total) by mouth once daily. 90 tablet 7/24/2024 7/24/2025 Ada Arzola MD    hydrocortisone (ANUSOL-HC) 25 mg suppository Place 1 suppository (25 mg total) rectally 2 (two) times daily. 24 suppository 7/24/2024 -- Ada Arzola MD    phenylephrine-cocoa butter 0.25-88.44 % Supp suppository Place 1 suppository rectally 4 (four) times daily as needed (anal pain). 24 suppository 7/24/2024 -- Ada Arzola MD    senna-docusate 8.6-50 mg (PERICOLACE) 8.6-50 mg per tablet Take 1-2 tablets daily to  ensure soft stools and prevent constipation.  You may skip this medication if you develop diarrhea. 60 tablet 7/24/2024 -- Ada Arzola MD    polyethylene glycol (GLYCOLAX) 17 gram PwPk Take 17 g by mouth daily as needed for Constipation. 10 each 7/24/2024 -- Ada Arzola MD          Follow-up Information       Follow up With Specialties Details Why Contact Info Additional Information    [facility] Trammell [facility] 4[address] [facility] G[facility]  preop as scheduled 8/9/24 [address] [facility] [phone] [facility] - Main Building, 4th Floor Please park in [facility] and take [facility] elevator    Adelfo Dewey MD Hospitalist  or another primary care physician regarding diabetes management [address] [facility] [phone]                Ada Arzola MD  07/25/24 0909

## 2024-07-25 NOTE — ED TRIAGE NOTES
Rectal pain with BRB with BMs for 2 months. Suspects that he has hemorrhoids. Denies constipation - LBM today. States unable to sit comfortably. Has tried OTC hemorrhoid treatments without relief. Presents awake, alert.

## 2024-08-09 ENCOUNTER — CLINICAL SUPPORT (OUTPATIENT)
Dept: ENDOSCOPY | Facility: HOSPITAL | Age: 44
End: 2024-08-09
Payer: COMMERCIAL

## 2024-08-09 ENCOUNTER — TELEPHONE (OUTPATIENT)
Dept: ENDOSCOPY | Facility: HOSPITAL | Age: 44
End: 2024-08-09

## 2024-08-09 DIAGNOSIS — Z12.11 ENCOUNTER FOR SCREENING COLONOSCOPY: ICD-10-CM

## 2024-08-09 RX ORDER — SODIUM, POTASSIUM,MAG SULFATES 17.5-3.13G
SOLUTION, RECONSTITUTED, ORAL ORAL
Qty: 1 KIT | Refills: 0 | Status: SHIPPED | OUTPATIENT
Start: 2024-08-09

## 2024-08-15 ENCOUNTER — TELEPHONE (OUTPATIENT)
Dept: ENDOSCOPY | Facility: HOSPITAL | Age: 44
End: 2024-08-15
Payer: COMMERCIAL

## 2024-08-15 NOTE — TELEPHONE ENCOUNTER
Contacted Pt for Endoscopy precall to confirm scheduled procedure(s) Colonoscopy on 8/16/24. Pt did not answer. Left voicemail for pt to call Endoscopy Scheduling to confirm.

## 2024-08-16 ENCOUNTER — HOSPITAL ENCOUNTER (OUTPATIENT)
Facility: HOSPITAL | Age: 44
Discharge: HOME | End: 2024-08-16
Attending: INTERNAL MEDICINE | Admitting: INTERNAL MEDICINE
Payer: COMMERCIAL

## 2024-08-16 ENCOUNTER — ANESTHESIA (OUTPATIENT)
Dept: ENDOSCOPY | Facility: HOSPITAL | Age: 44
End: 2024-08-16
Payer: COMMERCIAL

## 2024-08-16 ENCOUNTER — ANESTHESIA EVENT (OUTPATIENT)
Dept: ENDOSCOPY | Facility: HOSPITAL | Age: 44
End: 2024-08-16
Payer: COMMERCIAL

## 2024-08-16 VITALS
HEIGHT: 69 IN | TEMPERATURE: 98 F | HEART RATE: 65 BPM | RESPIRATION RATE: 18 BRPM | DIASTOLIC BLOOD PRESSURE: 70 MMHG | BODY MASS INDEX: 46.65 KG/M2 | OXYGEN SATURATION: 99 % | SYSTOLIC BLOOD PRESSURE: 128 MMHG | WEIGHT: 315 LBS

## 2024-08-16 DIAGNOSIS — Z12.11 SCREENING FOR COLON CANCER: ICD-10-CM

## 2024-08-16 LAB — POCT GLUCOSE: 184 MG/DL (ref 70–110)

## 2024-08-16 PROCEDURE — 82962 GLUCOSE BLOOD TEST: CPT | Performed by: INTERNAL MEDICINE

## 2024-08-16 PROCEDURE — 45385 COLONOSCOPY W/LESION REMOVAL: CPT | Mod: 33,,, | Performed by: INTERNAL MEDICINE

## 2024-08-16 PROCEDURE — 88305 TISSUE EXAM BY PATHOLOGIST: CPT | Performed by: STUDENT IN AN ORGANIZED HEALTH CARE EDUCATION/TRAINING PROGRAM

## 2024-08-16 PROCEDURE — 63600175 PHARM REV CODE 636 W HCPCS: Performed by: NURSE ANESTHETIST, CERTIFIED REGISTERED

## 2024-08-16 PROCEDURE — 27201089 HC SNARE, DISP (ANY): Performed by: INTERNAL MEDICINE

## 2024-08-16 PROCEDURE — 37000008 HC ANESTHESIA 1ST 15 MINUTES: Performed by: INTERNAL MEDICINE

## 2024-08-16 PROCEDURE — 25000003 PHARM REV CODE 250: Performed by: INTERNAL MEDICINE

## 2024-08-16 PROCEDURE — 37000009 HC ANESTHESIA EA ADD 15 MINS: Performed by: INTERNAL MEDICINE

## 2024-08-16 PROCEDURE — 25000003 PHARM REV CODE 250: Performed by: NURSE ANESTHETIST, CERTIFIED REGISTERED

## 2024-08-16 PROCEDURE — 88305 TISSUE EXAM BY PATHOLOGIST: CPT | Mod: 26,,, | Performed by: STUDENT IN AN ORGANIZED HEALTH CARE EDUCATION/TRAINING PROGRAM

## 2024-08-16 PROCEDURE — 45385 COLONOSCOPY W/LESION REMOVAL: CPT | Mod: 33 | Performed by: INTERNAL MEDICINE

## 2024-08-16 RX ORDER — LIDOCAINE HYDROCHLORIDE 20 MG/ML
INJECTION INTRAVENOUS
Status: DISCONTINUED | OUTPATIENT
Start: 2024-08-16 | End: 2024-08-16

## 2024-08-16 RX ORDER — SODIUM CHLORIDE 9 MG/ML
INJECTION, SOLUTION INTRAVENOUS CONTINUOUS
Status: DISCONTINUED | OUTPATIENT
Start: 2024-08-16 | End: 2024-08-16 | Stop reason: HOSPADM

## 2024-08-16 RX ORDER — PROPOFOL 10 MG/ML
VIAL (ML) INTRAVENOUS
Status: DISCONTINUED | OUTPATIENT
Start: 2024-08-16 | End: 2024-08-16

## 2024-08-16 RX ORDER — PROPOFOL 10 MG/ML
INJECTION, EMULSION INTRAVENOUS CONTINUOUS PRN
Status: DISCONTINUED | OUTPATIENT
Start: 2024-08-16 | End: 2024-08-16

## 2024-08-16 RX ADMIN — SODIUM CHLORIDE: 0.9 INJECTION, SOLUTION INTRAVENOUS at 10:08

## 2024-08-16 RX ADMIN — PROPOFOL 60 MG: 10 INJECTION, EMULSION INTRAVENOUS at 11:08

## 2024-08-16 RX ADMIN — PROPOFOL 30 MG: 10 INJECTION, EMULSION INTRAVENOUS at 11:08

## 2024-08-16 RX ADMIN — GLYCOPYRROLATE 0.2 MG: 0.2 INJECTION, SOLUTION INTRAMUSCULAR; INTRAVENOUS at 11:08

## 2024-08-16 RX ADMIN — PROPOFOL 125 MCG/KG/MIN: 10 INJECTION, EMULSION INTRAVENOUS at 11:08

## 2024-08-16 RX ADMIN — LIDOCAINE HYDROCHLORIDE 100 MG: 20 INJECTION INTRAVENOUS at 11:08

## 2024-08-16 RX ADMIN — PROPOFOL 110 MG: 10 INJECTION, EMULSION INTRAVENOUS at 11:08

## 2024-08-16 NOTE — TRANSFER OF CARE
"Anesthesia Transfer of Care Note    Patient: Nain Jain    Procedure(s) Performed: Procedure(s) (LRB):  COLONOSCOPY (N/A)    Patient location: GI    Anesthesia Type: general    Transport from OR: Transported from OR on 6-10 L/min O2 by face mask with adequate spontaneous ventilation    Post pain: adequate analgesia    Post assessment: no apparent anesthetic complications    Post vital signs: stable    Level of consciousness: responds to stimulation and awake    Nausea/Vomiting: no nausea/vomiting    Complications: none    Transfer of care protocol was followed      Last vitals: Visit Vitals  /69 (BP Location: Left arm, Patient Position: Lying)   Pulse 96   Temp 36.6 °C (97.9 °F) (Temporal)   Resp 18   Ht 5' 9" (1.753 m)   Wt (!) 144.2 kg (318 lb)   SpO2 98%   BMI 46.96 kg/m²     "

## 2024-08-16 NOTE — PROVATION PATIENT INSTRUCTIONS
Discharge Summary/Instructions after an Endoscopic Procedure  Patient Name: Nain Jain  Patient MRN: [id number]  Patient YOB: 1980  Friday, August 16, 2024  Fernando Holman MD  Dear patient,  As a result of recent federal legislation (The Federal Cures Act), you may   receive lab or pathology results from your procedure in your [facility]   account before your physician is able to contact you. Your physician or   their representative will relay the results to you with their   recommendations at their soonest availability.  Thank you,  RESTRICTIONS:  During your procedure today, you received medications for sedation.  These   medications may affect your judgment, balance and coordination.  Therefore,   for 24 hours, you have the following restrictions:   - DO NOT drive a car, operate machinery, make legal/financial decisions,   sign important papers or drink alcohol.    ACTIVITY:  Today: no heavy lifting, straining or running due to procedural   sedation/anesthesia.  The following day: return to full activity including work.  DIET:  Eat and drink normally unless instructed otherwise.     TREATMENT FOR COMMON SIDE EFFECTS:  - Mild abdominal pain, nausea, belching, bloating or excessive gas:  rest,   eat lightly and use a heating pad.  - Sore Throat: treat with throat lozenges and/or gargle with warm salt   water.  - Because air was used during the procedure, expelling large amounts of air   from your rectum or belching is normal.  - If a bowel prep was taken, you may not have a bowel movement for 1-3 days.    This is normal.  SYMPTOMS TO WATCH FOR AND REPORT TO YOUR PHYSICIAN:  1. Abdominal pain or bloating, other than gas cramps.  2. Chest pain.  3. Back pain.  4. Signs of infection such as: chills or fever occurring within 24 hours   after the procedure.  5. Rectal bleeding, which would show as bright red, maroon, or black stools.   (A tablespoon of blood from the rectum is not serious, especially  if   hemorrhoids are present.)  6. Vomiting.  7. Weakness or dizziness.  GO DIRECTLY TO THE NEAREST EMERGENCY ROOM IF YOU HAVE ANY OF THE FOLLOWING:      Difficulty breathing              Chills and/or fever over 101 F   Persistent vomiting and/or vomiting blood   Severe abdominal pain   Severe chest pain   Black, tarry stools   Bleeding- more than one tablespoon   Any other symptom or condition that you feel may need urgent attention  Your doctor recommends these additional instructions:  If any biopsies were taken, your doctors clinic will contact you in 1 to 2   weeks with any results.  - Discharge patient to home.   - Resume previous diet today.   - Continue present medications.   - Use fiber, for example Citrucel, Fibercon, Konsyl or Metamucil.   - Await pathology results.   - Repeat colonoscopy in 2 years because the bowel preparation was suboptimal   and for surveillance.   - Return to referring physician as previously scheduled.   - Patient has a contact number available for emergencies.  The signs and   symptoms of potential delayed complications were discussed with the   patient.  Return to normal activities tomorrow.  Written discharge   instructions were provided to the patient.  For questions, problems or results please call your physician - Fernando Holman MD at Work:  [phone].  [facility], EMERGENCY ROOM PHONE NUMBER: [phone]  IF A COMPLICATION OR EMERGENCY SITUATION ARISES AND YOU ARE UNABLE TO REACH   YOUR PHYSICIAN - GO DIRECTLY TO THE EMERGENCY ROOM.  Fernando Holman MD  8/16/2024 11:56:15 AM  This report has been verified and signed electronically.  Dear patient,  As a result of recent federal legislation (The Federal Cures Act), you may   receive lab or pathology results from your procedure in your [facility]   account before your physician is able to contact you. Your physician or   their representative will relay the results to you with their   recommendations  at their soonest availability.  Thank you,  PROVATION

## 2024-08-16 NOTE — ANESTHESIA POSTPROCEDURE EVALUATION
Anesthesia Post Evaluation    Patient: Nain Jain    Procedure(s) Performed: Procedure(s) (LRB):  COLONOSCOPY (N/A)    Final Anesthesia Type: general      Patient location during evaluation: PACU  Patient participation: Yes- Able to Participate  Level of consciousness: awake and alert and oriented  Post-procedure vital signs: reviewed and stable  Pain management: adequate  Airway patency: patent    PONV status at discharge: No PONV  Anesthetic complications: no      Cardiovascular status: blood pressure returned to baseline and hemodynamically stable  Respiratory status: unassisted  Hydration status: euvolemic  Follow-up not needed.              Vitals Value Taken Time   /64 08/16/24 1213   Temp 36.6 °C (97.9 °F) 08/16/24 1157   Pulse 83 08/16/24 1213   Resp 18 08/16/24 1213   SpO2 99 % 08/16/24 1213         No case tracking events are documented in the log.      Pain/Andria Score: Andria Score: 10 (8/16/2024 12:14 PM)

## 2024-08-16 NOTE — H&P
Short Stay Endoscopy History and Physical    PCP - Agustin Moeller MD (Inactive)    Procedure - Colonoscopy  ASA - 2  Mallampati - per anesthesia  History of Anesthesia problems - no  Family history Anesthesia problems -  no     HPI:  This is a 43 y.o. male here for evaluation of :     Average Risk Screening: yes  High risk screening: No  History of polyps: No  Anemia: No  Blood in stools: No  Diarrhea: No  Abdominal Pain: No    Review of Systems:  CONSTITUTIONAL: Denies weight change,  fatigue, fevers, chills, night sweats.  CARDIOVASCULAR: Denies chest pain, shortness of breath, orthopnea and edema.  RESPIRATORY: Denies cough, hemoptysis, dyspnea, and wheezing.  GI: See HPI.    Medical History:  Past Medical History:   Diagnosis Date    BMI 50.0-59.9, adult     Diabetes mellitus     Hyperlipidemia     Hypertension     Morbid obesity     Sleep apnea        Surgical History:   Past Surgical History:   Procedure Laterality Date    EXCISION OF MASS OF ABDOMEN N/A 4/22/2019    Procedure: EXCISION, MASS, ABDOMEN;  Surgeon: Pj Saucedo MD;  Location: [facility];  Service: General;  Laterality: N/A;    NO PAST SURGERIES         Family History:   Family History   Problem Relation Name Age of Onset    Diabetes Mother      Obesity Mother      Hypertension Mother      Diabetes Maternal Grandmother      COPD Father          smoker    No Known Problems Sister      No Known Problems Brother      No Known Problems Brother      No Known Problems Sister      No Known Problems Sister         Social History:   Social History     Tobacco Use    Smoking status: Never    Smokeless tobacco: Never   Substance Use Topics    Alcohol use: Yes     Comment: 2 bottles of wine a week - only drinks when he's off.    Drug use: No       Allergies: Reviewed.    Medications:  No current facility-administered medications on file prior to encounter.     Current Outpatient Medications on File Prior to Encounter   Medication Sig Dispense  Refill    FLUoxetine 20 MG capsule Take 1 capsule (20 mg total) by mouth once daily. 30 capsule 11    glipiZIDE 5 MG TR24 Take 2 tablets (10 mg total) by mouth daily with breakfast. 180 tablet 3    hydroCHLOROthiazide (HYDRODIURIL) 25 MG tablet Take 1 tablet (25 mg total) by mouth once daily. 90 tablet 3    metFORMIN (GLUCOPHAGE-XR) 500 MG ER 24hr tablet Take 2 tablets (1,000 mg total) by mouth 2 (two) times daily with meals. 360 tablet 3    QUEtiapine (SEROQUEL) 100 MG Tab Take 1 tablet (100 mg total) by mouth nightly. 30 tablet 3    rosuvastatin (CRESTOR) 40 MG Tab Take 1 tablet (40 mg total) by mouth every evening. 90 tablet 3    senna-docusate 8.6-50 mg (PERICOLACE) 8.6-50 mg per tablet Take 1-2 tablets daily to ensure soft stools and prevent constipation.  You may skip this medication if you develop diarrhea. 60 tablet 2    valsartan (DIOVAN) 320 MG tablet Take 1 tablet (320 mg total) by mouth once daily. 90 tablet 0    acyclovir (ZOVIRAX) 400 MG tablet Take 1 tablet (400 mg total) by mouth 2 (two) times daily. 60 tablet 0    hydrocortisone (ANUSOL-HC) 25 mg suppository Place 1 suppository (25 mg total) rectally 2 (two) times daily. 24 suppository 2    phenylephrine-cocoa butter 0.25-88.44 % Supp suppository Place 1 suppository rectally 4 (four) times daily as needed (anal pain). 24 suppository 2    polyethylene glycol (GLYCOLAX) 17 gram PwPk Take 17 g by mouth daily as needed for Constipation. 10 each 2    sodium,potassium,mag sulfates (SUPREP BOWEL PREP KIT) 17.5-3.13-1.6 gram SolR Follow instructions given by Endoscopy scheduling nurse 1 kit 0       Physical Exam:  Vital Signs:   Vitals:    08/16/24 1033   BP: (!) 151/76   Pulse: 78   Resp: 16   Temp: 97.7 °F (36.5 °C)     General Appearance: Well appearing in no acute distress  ENT: OP clear  Chest: CTA B  CV: RRR, no m/r/g  Abd: s/nt/nd/nabs  Ext: no edema    Labs:  Reviewed    IMPRESSION:    Screening    Plan:  I have explained the risks and benefits of  colonoscopy to the patient including but not limited to bleeding, perforation, infection, and death. The patient wishes to proceed with colonoscopy.

## 2024-08-16 NOTE — ANESTHESIA PREPROCEDURE EVALUATION
08/16/2024  Nain Jain is a 43 y.o., male.    Past Medical History:   Diagnosis Date    BMI 50.0-59.9, adult     Diabetes mellitus     Hyperlipidemia     Hypertension     Morbid obesity     Sleep apnea      Patient Active Problem List   Diagnosis    Hypertension associated with diabetes    Diabetes mellitus type 2 in obese    Observed sleep apnea    Herpes keratitis    Normocytic anemia    Thrombocytosis    Hyperlipidemia associated with type 2 diabetes mellitus    Depression with suicidal ideation    Severe episode of recurrent major depressive disorder, without psychotic features    Type 2 diabetes mellitus, without long-term current use of insulin    Insomnia     Social History     Socioeconomic History    Marital status: Single   Occupational History    Occupation:    Tobacco Use    Smoking status: Never    Smokeless tobacco: Never   Substance and Sexual Activity    Alcohol use: Yes     Comment: 2 bottles of wine a week - only drinks when he's off.    Drug use: No   Other Topics Concern    Patient feels they ought to cut down on drinking/drug use No    Patient annoyed by others criticizing their drinking/drug use No    Patient has felt bad or guilty about drinking/drug use No    Patient has had a drink/used drugs as an eye opener in the AM No   Social History Narrative    Lives w/wife and 1 child.       Past Surgical History:   Procedure Laterality Date    EXCISION OF MASS OF ABDOMEN N/A 4/22/2019    Procedure: EXCISION, MASS, ABDOMEN;  Surgeon: Pj Saucedo MD;  Location: [facility] OR;  Service: General;  Laterality: N/A;    NO PAST SURGERIES       No current facility-administered medications on file prior to encounter.     Current Outpatient Medications on File Prior to Encounter   Medication Sig Dispense Refill    FLUoxetine 20 MG capsule Take 1 capsule (20 mg total) by mouth  once daily. 30 capsule 11    glipiZIDE 5 MG TR24 Take 2 tablets (10 mg total) by mouth daily with breakfast. 180 tablet 3    hydroCHLOROthiazide (HYDRODIURIL) 25 MG tablet Take 1 tablet (25 mg total) by mouth once daily. 90 tablet 3    metFORMIN (GLUCOPHAGE-XR) 500 MG ER 24hr tablet Take 2 tablets (1,000 mg total) by mouth 2 (two) times daily with meals. 360 tablet 3    QUEtiapine (SEROQUEL) 100 MG Tab Take 1 tablet (100 mg total) by mouth nightly. 30 tablet 3    rosuvastatin (CRESTOR) 40 MG Tab Take 1 tablet (40 mg total) by mouth every evening. 90 tablet 3    senna-docusate 8.6-50 mg (PERICOLACE) 8.6-50 mg per tablet Take 1-2 tablets daily to ensure soft stools and prevent constipation.  You may skip this medication if you develop diarrhea. 60 tablet 2    valsartan (DIOVAN) 320 MG tablet Take 1 tablet (320 mg total) by mouth once daily. 90 tablet 0    acyclovir (ZOVIRAX) 400 MG tablet Take 1 tablet (400 mg total) by mouth 2 (two) times daily. 60 tablet 0    hydrocortisone (ANUSOL-HC) 25 mg suppository Place 1 suppository (25 mg total) rectally 2 (two) times daily. 24 suppository 2    phenylephrine-cocoa butter 0.25-88.44 % Supp suppository Place 1 suppository rectally 4 (four) times daily as needed (anal pain). 24 suppository 2    polyethylene glycol (GLYCOLAX) 17 gram PwPk Take 17 g by mouth daily as needed for Constipation. 10 each 2    sodium,potassium,mag sulfates (SUPREP BOWEL PREP KIT) 17.5-3.13-1.6 gram SolR Follow instructions given by Endoscopy scheduling nurse 1 kit 0     Pre-op Assessment    I have reviewed the NPO Status.      Review of Systems  Anesthesia Hx:               Denies Personal Hx of Anesthesia complications.                    Social:  Alcohol Use       Cardiovascular:  Exercise tolerance: good   Hypertension           hyperlipidemia                             Pulmonary:        Sleep Apnea, CPAP                Renal/:  Renal/ Normal                 Hepatic/GI:  Hepatic/GI Normal                  Neurological:  Neurology Normal                                      Endocrine:  Diabetes           Psych:    depression                Physical Exam    Airway:  Mallampati: II   Neck ROM: Normal ROM        Anesthesia Plan  Type of Anesthesia, risks & benefits discussed:    Anesthesia Type: Gen Natural Airway  Intra-op Monitoring Plan: Standard ASA Monitors  Induction:  IV  Informed Consent: Informed consent signed with the Patient and all parties understand the risks and agree with anesthesia plan.  All questions answered.   ASA Score: 3    Ready For Surgery From Anesthesia Perspective.     .       no

## 2024-08-19 LAB
FINAL PATHOLOGIC DIAGNOSIS: NORMAL
GROSS: NORMAL
Lab: NORMAL

## 2024-10-28 RX ORDER — VALSARTAN 320 MG/1
320 TABLET ORAL DAILY
Qty: 90 TABLET | Refills: 0 | Status: SHIPPED | OUTPATIENT
Start: 2024-10-28

## 2025-02-25 DIAGNOSIS — F33.2 SEVERE EPISODE OF RECURRENT MAJOR DEPRESSIVE DISORDER, WITHOUT PSYCHOTIC FEATURES: ICD-10-CM

## 2025-02-28 DIAGNOSIS — F33.2 SEVERE EPISODE OF RECURRENT MAJOR DEPRESSIVE DISORDER, WITHOUT PSYCHOTIC FEATURES: ICD-10-CM

## 2025-02-28 RX ORDER — QUETIAPINE FUMARATE 100 MG/1
100 TABLET, FILM COATED ORAL NIGHTLY
Qty: 30 TABLET | Refills: 3 | OUTPATIENT
Start: 2025-02-28 | End: 2025-08-27

## 2025-03-08 ENCOUNTER — NURSE TRIAGE (OUTPATIENT)
Dept: ADMINISTRATIVE | Facility: CLINIC | Age: 45
End: 2025-03-08
Payer: COMMERCIAL

## 2025-03-08 ENCOUNTER — OCHSNER VIRTUAL EMERGENCY DEPARTMENT (OUTPATIENT)
Facility: CLINIC | Age: 45
End: 2025-03-08
Payer: COMMERCIAL

## 2025-03-08 ENCOUNTER — PATIENT OUTREACH (OUTPATIENT)
Facility: OTHER | Age: 45
End: 2025-03-08
Payer: COMMERCIAL

## 2025-03-08 DIAGNOSIS — F33.2 SEVERE EPISODE OF RECURRENT MAJOR DEPRESSIVE DISORDER, WITHOUT PSYCHOTIC FEATURES: ICD-10-CM

## 2025-03-08 DIAGNOSIS — Z76.0 MEDICATION REFILL: Primary | ICD-10-CM

## 2025-03-08 NOTE — PLAN OF CARE-OVED
[facility] Emergency Department Plan of Care Note  Referral Source: Nurse On-Call                               Chief Complaint   Patient presents with    Medication Refill      43 yo male with PMhx of HTN, HLD, DM, WALTER, morbid obesity calls nursing hotline for request for refill of seroquel 100 mg. He called Dr. Newton during the week for the refill, but he did not get a reply. He states he takes this medication for insomnia.     Per chart review his PCP previously refused to refill seroquel and advised him to have this refilled by a psychiatrist. But that they would discuss this further at his PCP appt 3/6.     Patient has multiple no shows to PCP, including 3/6 (states could not get off work), 12/16, and 8/2.      His PCP has requested he see them in person prior to refilling this medication.      Recommendation: Primary Care                          Given PCP request, will honor their desire to see him in person before writing refill for seroquel.     Future Appointments   Date Time Provider Department Center   3/10/2025  1:30 PM Keila Chino, NP-C [facility]     We made appt with patient for 2 days, patient states he should be able to make this appt with his work schedule. Nurse discussed with him importance of going to his appts for continued management of his chronic conditions.     Encounter Diagnosis   Name Primary?    Medication refill Yes

## 2025-03-08 NOTE — TELEPHONE ENCOUNTER
Reason for Disposition   [1] Prescription refill request for ESSENTIAL medicine (i.e., likelihood of harm to patient if not taken) AND [2] triager unable to refill per department policy    Protocols used: Medication Refill and Renewal Call-A-  This pt is requesting a refill for his Seroquel 100 mg at his [facility] pharmacy on file. He called Dr. Newton during the week for the refill, but he did not get a reply. Unable to reach the Provider on call-Dr. Dinero via telephone and secure chat. Referred to Alexy Provider on call Dr. Peters and she stated the pt missed his office appointments for refills and it is not safe to refill a psychiatric medication without evaluating him in person. She advised to set an appointment. Pt states he missed his office appointment because he had to work. States he needs his medication and cannot sleep.  Pt accepted an appointment for Monday at 1:30 pm with BOBY Wall NP.     This nurse advised the pt to go to Urgent Care or the ED (if unable to go to Urgent Care) if he becomes worse/ill before his appointment. Stressed to pt the importance of keeping his appointment to get his medication refilled and he verbalized understanding.

## 2025-03-08 NOTE — TELEPHONE ENCOUNTER
Attempted to call pt x3; unable to reach pt. LVM. Verified phone number.    Reason for Disposition   Third attempt to contact caller AND no contact made. Phone number verified.    Protocols used: No Contact or Duplicate Contact Call-A-AH

## 2025-03-08 NOTE — PROGRESS NOTES
"Patient called and spoke to [facility] RN on call nurse to report the following, "This pt is requesting a refill for his Seroquel 100 mg at his [facility] pharmacy on file. He called Dr. Newton during the week for the refill, but he did not get a reply."    [facility] RN on call nurse consulted with AlexyBRIANA SANTANA on call, Dr. Minnie Peters, and disposition was for patient to follow up with primary care. Primary care appointment scheduled on 03/10/2025 at 1:30 p.m. with Keila Chino, NP-C. Follow up scheduled on 03/11/2025 to outreach the patient to assess for any additional needs/concerns following this appointment.    "

## 2025-03-10 ENCOUNTER — OFFICE VISIT (OUTPATIENT)
Dept: INTERNAL MEDICINE | Facility: CLINIC | Age: 45
End: 2025-03-10
Payer: COMMERCIAL

## 2025-03-10 VITALS
HEIGHT: 69 IN | WEIGHT: 313.06 LBS | OXYGEN SATURATION: 80 % | SYSTOLIC BLOOD PRESSURE: 136 MMHG | HEART RATE: 80 BPM | BODY MASS INDEX: 46.37 KG/M2 | DIASTOLIC BLOOD PRESSURE: 86 MMHG

## 2025-03-10 DIAGNOSIS — I15.2 HYPERTENSION ASSOCIATED WITH DIABETES: ICD-10-CM

## 2025-03-10 DIAGNOSIS — E11.59 HYPERTENSION ASSOCIATED WITH DIABETES: ICD-10-CM

## 2025-03-10 DIAGNOSIS — Z23 NEED FOR PNEUMOCOCCAL VACCINATION: ICD-10-CM

## 2025-03-10 DIAGNOSIS — F33.2 SEVERE EPISODE OF RECURRENT MAJOR DEPRESSIVE DISORDER, WITHOUT PSYCHOTIC FEATURES: Primary | ICD-10-CM

## 2025-03-10 PROCEDURE — 3079F DIAST BP 80-89 MM HG: CPT | Mod: CPTII,S$GLB,,

## 2025-03-10 PROCEDURE — 4010F ACE/ARB THERAPY RXD/TAKEN: CPT | Mod: CPTII,S$GLB,,

## 2025-03-10 PROCEDURE — 3008F BODY MASS INDEX DOCD: CPT | Mod: CPTII,S$GLB,,

## 2025-03-10 PROCEDURE — 90471 IMMUNIZATION ADMIN: CPT | Mod: S$GLB,,,

## 2025-03-10 PROCEDURE — 1159F MED LIST DOCD IN RCRD: CPT | Mod: CPTII,S$GLB,,

## 2025-03-10 PROCEDURE — 99213 OFFICE O/P EST LOW 20 MIN: CPT | Mod: 25,S$GLB,,

## 2025-03-10 PROCEDURE — 99999 PR PBB SHADOW E&M-EST. PATIENT-LVL IV: CPT | Mod: PBBFAC,,,

## 2025-03-10 PROCEDURE — 3075F SYST BP GE 130 - 139MM HG: CPT | Mod: CPTII,S$GLB,,

## 2025-03-10 PROCEDURE — 90677 PCV20 VACCINE IM: CPT | Mod: S$GLB,,,

## 2025-03-10 PROCEDURE — 3072F LOW RISK FOR RETINOPATHY: CPT | Mod: CPTII,S$GLB,,

## 2025-03-10 RX ORDER — QUETIAPINE FUMARATE 100 MG/1
100 TABLET, FILM COATED ORAL NIGHTLY
Qty: 30 TABLET | Refills: 3 | OUTPATIENT
Start: 2025-03-10 | End: 2025-09-06

## 2025-03-10 RX ORDER — QUETIAPINE FUMARATE 100 MG/1
100 TABLET, FILM COATED ORAL NIGHTLY
Qty: 30 TABLET | Refills: 1 | Status: SHIPPED | OUTPATIENT
Start: 2025-03-10 | End: 2025-03-10

## 2025-03-10 RX ORDER — QUETIAPINE FUMARATE 100 MG/1
100 TABLET, FILM COATED ORAL NIGHTLY
Qty: 30 TABLET | Refills: 0 | Status: SHIPPED | OUTPATIENT
Start: 2025-03-10 | End: 2025-09-06

## 2025-03-10 RX ORDER — QUETIAPINE FUMARATE 100 MG/1
100 TABLET, FILM COATED ORAL NIGHTLY
Qty: 30 TABLET | Refills: 0 | OUTPATIENT
Start: 2025-03-10

## 2025-03-10 NOTE — LETTER
March 10, 2025      [facility]  [address]  [facility] [address]  Phone: [phone]  Fax: [phone]       Patient: Nain Jain   YOB: 1980  Date of Visit: 03/10/2025    To Whom It May Concern:    Barrera Jain  was at [facility] on 03/10/2025. The patient may return to work/school on March 11, 2025 with no restrictions. If you have any questions or concerns, or if I can be of further assistance, please do not hesitate to contact me.    Sincerely,    NENO LockC

## 2025-03-10 NOTE — PROGRESS NOTES
INTERNAL MEDICINE URGENT CARE NOTE    CHIEF COMPLAINT     Nain presents today for Seroquel medication refill.    HPI     Nain Jain is a 44 y.o. male with of HTN, HLD, DM, WALTER, morbid obesity who presents for an urgent care visit today for medication refill.    MENTAL HEALTH:  He has a history of depression, stress, and anxiety. He previously completed behavioral health treatment and is not interested in resuming therapy at this time. He is not interested in seeing psychiatry. He has been taking Seroquel for 2-3 years, primarily for sleep management, reporting the medication is effective for symptom control.    SLEEP:  He reports difficulty sleeping due to unpredictable work schedule as a , with shifts varying between daytime and overnight hours. He reports using Seroquel to help regulate sleep patterns around his variable work schedule.    CURRENT MEDICATIONS:  He takes Bystolic 320mg for hypertension and Seroquel for sleep/mental health management.    Review of Systems:  General: -fever, -chills, -fatigue, -weight gain, -weight loss  Eyes: -vision changes, -redness, -discharge  ENT: -ear pain, -nasal congestion, -sore throat  Cardiovascular: -chest pain, -palpitations, -lower extremity edema  Respiratory: -cough, -shortness of breath  Gastrointestinal: -abdominal pain, -nausea, -vomiting, -diarrhea, -constipation, -blood in stool  Genitourinary: -dysuria, -hematuria, -frequency  Musculoskeletal: -joint pain, -muscle pain  Skin: -rash, -lesion  Neurological: -headache, -dizziness, -numbness, -tingling  Psychiatric: -anxiety, -depression, +sleep difficulty        Past Medical History:  Past Medical History:   Diagnosis Date    BMI 50.0-59.9, adult     Diabetes mellitus     Hyperlipidemia     Hypertension     Morbid obesity     Sleep apnea      Home Medications:  Prior to Admission medications    Medication Sig Start Date End Date Taking? Authorizing Provider   FLUoxetine 20 MG capsule Take 1  capsule (20 mg total) by mouth once daily. 5/5/24 5/5/25 Yes Keren Devlin MD   glipiZIDE 5 MG TR24 Take 2 tablets (10 mg total) by mouth daily with breakfast. 7/24/24 7/24/25 Yes Ada Arzola MD   hydroCHLOROthiazide (HYDRODIURIL) 25 MG tablet Take 1 tablet (25 mg total) by mouth once daily. 7/24/24 7/24/25 Yes Ada Arzola MD   hydrocortisone (ANUSOL-HC) 25 mg suppository Place 1 suppository (25 mg total) rectally 2 (two) times daily. 7/24/24  Yes Ada Arzola MD   metFORMIN (GLUCOPHAGE-XR) 500 MG ER 24hr tablet Take 2 tablets (1,000 mg total) by mouth 2 (two) times daily with meals. 7/24/24 7/24/25 Yes Ada Arzola MD   phenylephrine-cocoa butter 0.25-88.44 % Supp suppository Place 1 suppository rectally 4 (four) times daily as needed (anal pain). 7/24/24  Yes Ada Arzola MD   polyethylene glycol (GLYCOLAX) 17 gram PwPk Take 17 g by mouth daily as needed for Constipation. 7/24/24  Yes Ada Arzola MD   QUEtiapine (SEROQUEL) 100 MG Tab Take 1 tablet (100 mg total) by mouth nightly. 10/28/24 4/26/25 Yes Benedicto Newton MD   rosuvastatin (CRESTOR) 40 MG Tab Take 1 tablet (40 mg total) by mouth every evening. 7/24/24  Yes Ada Arzola MD   senna-docusate 8.6-50 mg (PERICOLACE) 8.6-50 mg per tablet Take 1-2 tablets daily to ensure soft stools and prevent constipation.  You may skip this medication if you develop diarrhea. 7/24/24  Yes Ada Arzola MD   valsartan (DIOVAN) 320 MG tablet Take 1 tablet (320 mg total) by mouth once daily. 1/24/25  Yes Benedicto Newton MD   acyclovir (ZOVIRAX) 400 MG tablet Take 1 tablet (400 mg total) by mouth 2 (two) times daily. 5/14/24 6/13/24  Shelli Rodarte, OD     PHYSICAL EXAM     Vitals: Blood pressure: 136/86.  General: No acute distress. Well-developed. Obese  Eyes: EOMI. Sclerae anicteric.  HENT: Normocephalic. Atraumatic.   Cardiovascular: Regular rate. Regular rhythm. No murmurs. No rubs. No gallops. Normal S1,  "S2.  Respiratory: Normal respiratory effort. Clear to auscultation bilaterally. No rales. No rhonchi. No wheezing.  Musculoskeletal: No  obvious deformity.  Extremities: No lower extremity edema.  Neurological: Alert & oriented x3. No slurred speech. Normal gait.  Psychiatric: Normal mood. Normal affect. Good insight. Good judgment.  Skin: Warm. Dry. No rash.        /86   Pulse 80   Ht 5' 9" (1.753 m)   Wt (!) 142 kg (313 lb 0.9 oz)   SpO2 (!) 80%   BMI 46.23 kg/m²     ASSESSMENT/PLAN     IMPRESSION:  - Assessed patient's request for Seroquel refill, noting previous prescription for depression   - Reviewed medication history, identifying inconsistencies in previous prescribing patterns  - Evaluated appropriateness of continuing Seroquel without ongoing psychiatric care  - Will provide limited refill to bridge care until patient establishes Psychiatry and PCP  - Considered patient's hypertension, noting  blood pressure reading of 136/86  - Reviewed diabetes management, noting patient reports adequate control  - PCP previously refused refill of Seroquel citing medication needs to be filled by psychiatry   - PCP will give one time refill pending PCP apt. This apt was scheduled for that purpose.  - Patient has multiple no shows to PCP, including 3/6 (states could not get off work), 12/16, and 8/2.     DEPRESSION:  - Acknowledged the patient's history of depression.  - Noted that quetiapine is being used as a sleep aid.  - Advised patient to establish care with PCP and psychiatry  - Refilled quetiapine as a one time refill to bridge care.    HYPERTENSION:  - Continue valsartan 320 mg and hydrodiuril 25 mg daily for hypertension management.  - Evaluated the patient's blood pressure, noting the reading of 136/86.    PNEUMOCOCCAL VACCINATION:  - Administered Prevnar 20 (pneumococcal vaccine) in office.    PRIMARY CARE REFERRAL:  - Referred to PCP for establishing care.  - Referred to Psychiatry    FOLLOW UP:  - " Follow up with new PCP as soon as possible for ongoing management of medications and health conditions.          Patient education provided from [facility]. Patient was counseled on when and how to seek emergent care.   This note was generated with the assistance of ambient listening technology. Verbal consent was obtained by the patient and accompanying visitor(s) for the recording of patient appointment to facilitate this note. I attest to having reviewed and edited the generated note for accuracy, though some syntax or spelling errors may persist. Please contact the author of this note for any clarification.       Keila KOCH, APRN, FNP-c   Department of Internal Medicine - [facility]  1:37 PM

## 2025-03-11 ENCOUNTER — PATIENT OUTREACH (OUTPATIENT)
Facility: OTHER | Age: 45
End: 2025-03-11
Payer: COMMERCIAL

## 2025-03-11 NOTE — PROGRESS NOTES
Contacted patient to follow up regarding primary care appointment on 3/10/25, no answer, LVM asking for return call. Per chart review, patient attended appointment.

## 2025-04-28 RX ORDER — VALSARTAN 320 MG/1
320 TABLET ORAL DAILY
Qty: 90 TABLET | Refills: 0 | Status: CANCELLED | OUTPATIENT
Start: 2025-04-28

## 2025-05-07 RX ORDER — FLUOXETINE HYDROCHLORIDE 20 MG/1
20 CAPSULE ORAL DAILY
Qty: 30 CAPSULE | Refills: 11 | Status: CANCELLED | OUTPATIENT
Start: 2025-05-07 | End: 2026-05-07

## 2025-05-08 RX ORDER — VALSARTAN 320 MG/1
320 TABLET ORAL DAILY
Qty: 90 TABLET | Refills: 0 | Status: SHIPPED | OUTPATIENT
Start: 2025-05-08

## 2025-05-08 NOTE — TELEPHONE ENCOUNTER
Refill Routing Note   Medication(s) are not appropriate for processing by [facility] for the following reason(s):        Responsible provider unclear    [facility] action(s):  Defer        Medication Therapy Plan: Prescribing provider is different than assigned PCP      Appointments  past 12m or future 3m with PCP    Date Provider   Last Visit   Visit date not found Benedicto Newton MD   Next Visit   Visit date not found Benedicto Newton MD   ED visits in past 90 days: 0        Note composed:12:44 PM 05/08/2025

## 2025-05-13 RX ORDER — FLUOXETINE 20 MG/1
20 CAPSULE ORAL DAILY
Qty: 30 CAPSULE | Refills: 11 | Status: CANCELLED | OUTPATIENT
Start: 2025-05-13 | End: 2026-05-13

## 2025-05-14 NOTE — TELEPHONE ENCOUNTER
Prescription denied.  The patient will need to contact his primary care physician for further refills.  The patient was called, no answer,  Message left on his voicemail.

## 2025-06-11 ENCOUNTER — OFFICE VISIT (OUTPATIENT)
Dept: OPTOMETRY | Facility: CLINIC | Age: 45
End: 2025-06-11
Payer: COMMERCIAL

## 2025-06-11 DIAGNOSIS — H52.4 PRESBYOPIA: ICD-10-CM

## 2025-06-11 DIAGNOSIS — B00.52 HERPES KERATITIS: ICD-10-CM

## 2025-06-11 DIAGNOSIS — E11.9 TYPE 2 DIABETES MELLITUS WITHOUT RETINOPATHY: ICD-10-CM

## 2025-06-11 DIAGNOSIS — Z01.00 ROUTINE EYE EXAM: Primary | ICD-10-CM

## 2025-06-11 PROCEDURE — 4010F ACE/ARB THERAPY RXD/TAKEN: CPT | Mod: CPTII,S$GLB,, | Performed by: OPTOMETRIST

## 2025-06-11 PROCEDURE — 1159F MED LIST DOCD IN RCRD: CPT | Mod: CPTII,S$GLB,, | Performed by: OPTOMETRIST

## 2025-06-11 PROCEDURE — 92014 COMPRE OPH EXAM EST PT 1/>: CPT | Mod: S$GLB,,, | Performed by: OPTOMETRIST

## 2025-06-11 PROCEDURE — 2023F DILAT RTA XM W/O RTNOPTHY: CPT | Mod: CPTII,S$GLB,, | Performed by: OPTOMETRIST

## 2025-06-11 PROCEDURE — 99999 PR PBB SHADOW E&M-EST. PATIENT-LVL III: CPT | Mod: PBBFAC,,, | Performed by: OPTOMETRIST

## 2025-06-12 DIAGNOSIS — F33.2 SEVERE EPISODE OF RECURRENT MAJOR DEPRESSIVE DISORDER, WITHOUT PSYCHOTIC FEATURES: ICD-10-CM

## 2025-06-13 RX ORDER — QUETIAPINE FUMARATE 100 MG/1
100 TABLET, FILM COATED ORAL NIGHTLY
Qty: 30 TABLET | Refills: 0 | OUTPATIENT
Start: 2025-06-13 | End: 2025-12-10

## 2025-06-14 DIAGNOSIS — F33.2 SEVERE EPISODE OF RECURRENT MAJOR DEPRESSIVE DISORDER, WITHOUT PSYCHOTIC FEATURES: ICD-10-CM

## 2025-06-16 RX ORDER — QUETIAPINE FUMARATE 100 MG/1
100 TABLET, FILM COATED ORAL NIGHTLY
Qty: 30 TABLET | Refills: 0 | OUTPATIENT
Start: 2025-06-16 | End: 2025-12-13

## 2025-06-22 DIAGNOSIS — F33.2 SEVERE EPISODE OF RECURRENT MAJOR DEPRESSIVE DISORDER, WITHOUT PSYCHOTIC FEATURES: ICD-10-CM

## 2025-06-23 RX ORDER — QUETIAPINE FUMARATE 100 MG/1
100 TABLET, FILM COATED ORAL NIGHTLY
Qty: 30 TABLET | Refills: 0 | OUTPATIENT
Start: 2025-06-23 | End: 2025-12-20

## 2025-06-23 NOTE — TELEPHONE ENCOUNTER
Called pt to inform them that their refill request was denied for the Seroquel bc they have not been seen by their PCP in over six months. Informed them that Chino could not refill it and left number for central scheduling so they could schedule with their PCP

## 2025-06-25 DIAGNOSIS — F33.2 SEVERE EPISODE OF RECURRENT MAJOR DEPRESSIVE DISORDER, WITHOUT PSYCHOTIC FEATURES: ICD-10-CM

## 2025-06-25 RX ORDER — QUETIAPINE FUMARATE 100 MG/1
100 TABLET, FILM COATED ORAL NIGHTLY
Qty: 30 TABLET | Refills: 0 | OUTPATIENT
Start: 2025-06-25 | End: 2025-12-22

## 2025-06-25 NOTE — TELEPHONE ENCOUNTER
Informed pt that Matti could not refill his seroquel and that he would need to go through his PCP which he said he did not have one. I attempted to schedule a new appt with a PCP when pt became frustrated and hung up on me stating that he would go to the ER to get the script filled

## 2025-06-26 NOTE — PROGRESS NOTES
HPI    CC: 45 yo M presents today for routine eye exam. Pt reports no vision   complaints. Pt is not wearing any vision correction. Pt would like his   [facility] paperwork updated.     KATHE: 5/14/2024    (-) Changes in vision   (-) Pain  (-) Irritation   (-) Itching   (-) Flashes  (-) Floaters  (-) Glasses wearer  (-) CL wearer  (-) Uses eye gtts    Does patient want a refraction today? no    (-) Eye injury  (-) Eye surgery   (+)POHx, Herpetic keratitis  (-)FOHx    (+)DM  Hemoglobin A1C       Date                     Value               Ref Range             Status                03/17/2022               7.3 (H)             4.0 - 5.6 %           Final                  04/06/2021               8.0 (H)             4.0 - 5.6 %           Final                   10/14/2020               8.0 (H)             4.0 - 5.6 %           Final                 Last edited by Shelli Rodarte, OD on 6/26/2025 10:05 AM.            Assessment /Plan     For exam results, see Encounter Report.    Routine eye exam    Presbyopia    Herpes keratitis    Type 2 diabetes mellitus without retinopathy      [facility] Vision Exam.     2. Recommend use of OTC reading glasses prn for near work. Monitor yearly.      3. H/o HSV keratitis OD. Stressed to pt that he needs to f/u with eyecare provider if/when he has a flare-up. No dendrite or corneal edema noted on exam today. Uncorrected VA OD 20/25. Patient to start Acyclovir when flare up occurs AND to RTC for corneal eval. Monitor yearly unless symptoms occur sooner.     4. No retinopathy noted, OU. Continue proper BS control and annual diabetic eye exams. Monitor yearly.        RTC in 1 year for annual eye exam or sooner if needed.

## 2025-07-11 ENCOUNTER — OFFICE VISIT (OUTPATIENT)
Dept: INTERNAL MEDICINE | Facility: CLINIC | Age: 45
End: 2025-07-11
Payer: COMMERCIAL

## 2025-07-11 VITALS
OXYGEN SATURATION: 98 % | HEIGHT: 69 IN | SYSTOLIC BLOOD PRESSURE: 148 MMHG | HEART RATE: 82 BPM | DIASTOLIC BLOOD PRESSURE: 90 MMHG | WEIGHT: 308.44 LBS | BODY MASS INDEX: 45.68 KG/M2

## 2025-07-11 DIAGNOSIS — F33.42 RECURRENT MAJOR DEPRESSIVE DISORDER, IN FULL REMISSION: ICD-10-CM

## 2025-07-11 DIAGNOSIS — D75.839 THROMBOCYTOSIS: ICD-10-CM

## 2025-07-11 DIAGNOSIS — G47.00 INSOMNIA, UNSPECIFIED TYPE: ICD-10-CM

## 2025-07-11 DIAGNOSIS — B00.52 HERPES KERATITIS: ICD-10-CM

## 2025-07-11 DIAGNOSIS — E78.5 HYPERLIPIDEMIA ASSOCIATED WITH TYPE 2 DIABETES MELLITUS: ICD-10-CM

## 2025-07-11 DIAGNOSIS — E11.59 HYPERTENSION ASSOCIATED WITH DIABETES: ICD-10-CM

## 2025-07-11 DIAGNOSIS — Z23 NEED FOR VACCINATION: ICD-10-CM

## 2025-07-11 DIAGNOSIS — E11.69 HYPERLIPIDEMIA ASSOCIATED WITH TYPE 2 DIABETES MELLITUS: ICD-10-CM

## 2025-07-11 DIAGNOSIS — E66.9 DIABETES MELLITUS TYPE 2 IN OBESE: Primary | ICD-10-CM

## 2025-07-11 DIAGNOSIS — E66.813 CLASS 3 SEVERE OBESITY DUE TO EXCESS CALORIES WITH SERIOUS COMORBIDITY AND BODY MASS INDEX (BMI) OF 45.0 TO 49.9 IN ADULT: ICD-10-CM

## 2025-07-11 DIAGNOSIS — D64.9 NORMOCYTIC ANEMIA: ICD-10-CM

## 2025-07-11 DIAGNOSIS — E11.69 DIABETES MELLITUS TYPE 2 IN OBESE: Primary | ICD-10-CM

## 2025-07-11 DIAGNOSIS — I15.2 HYPERTENSION ASSOCIATED WITH DIABETES: ICD-10-CM

## 2025-07-11 DIAGNOSIS — E66.01 CLASS 3 SEVERE OBESITY DUE TO EXCESS CALORIES WITH SERIOUS COMORBIDITY AND BODY MASS INDEX (BMI) OF 45.0 TO 49.9 IN ADULT: ICD-10-CM

## 2025-07-11 DIAGNOSIS — G47.33 OBSTRUCTIVE SLEEP APNEA: ICD-10-CM

## 2025-07-11 PROBLEM — F32.A DEPRESSION WITH SUICIDAL IDEATION: Status: RESOLVED | Noted: 2020-08-05 | Resolved: 2025-07-11

## 2025-07-11 PROBLEM — R45.851 DEPRESSION WITH SUICIDAL IDEATION: Status: RESOLVED | Noted: 2020-08-05 | Resolved: 2025-07-11

## 2025-07-11 LAB
ALBUMIN/CREAT UR: 15.8 UG/MG
CREAT UR-MCNC: 133 MG/DL (ref 23–375)
MICROALBUMIN UR-MCNC: 21 UG/ML (ref ?–5000)

## 2025-07-11 PROCEDURE — 82043 UR ALBUMIN QUANTITATIVE: CPT | Performed by: STUDENT IN AN ORGANIZED HEALTH CARE EDUCATION/TRAINING PROGRAM

## 2025-07-11 PROCEDURE — 99999 PR PBB SHADOW E&M-EST. PATIENT-LVL IV: CPT | Mod: PBBFAC,,, | Performed by: STUDENT IN AN ORGANIZED HEALTH CARE EDUCATION/TRAINING PROGRAM

## 2025-07-11 RX ORDER — QUETIAPINE FUMARATE 100 MG/1
100 TABLET, FILM COATED ORAL NIGHTLY
Qty: 90 TABLET | Refills: 3 | Status: SHIPPED | OUTPATIENT
Start: 2025-07-11 | End: 2026-07-06

## 2025-07-11 RX ORDER — FLUOXETINE 20 MG/1
20 CAPSULE ORAL DAILY
Qty: 90 CAPSULE | Refills: 3 | Status: SHIPPED | OUTPATIENT
Start: 2025-07-11 | End: 2026-07-06

## 2025-07-11 RX ORDER — ROSUVASTATIN CALCIUM 40 MG/1
40 TABLET, COATED ORAL NIGHTLY
Qty: 30 TABLET | Refills: 11 | Status: SHIPPED | OUTPATIENT
Start: 2025-07-11 | End: 2026-07-11

## 2025-07-11 RX ORDER — OLMESARTAN MEDOXOMIL AND HYDROCHLOROTHIAZIDE 40/12.5 40; 12.5 MG/1; MG/1
TABLET ORAL
COMMUNITY
End: 2025-07-11

## 2025-07-11 RX ORDER — METFORMIN HYDROCHLORIDE 500 MG/1
500 TABLET, EXTENDED RELEASE ORAL 2 TIMES DAILY WITH MEALS
Qty: 60 TABLET | Refills: 11 | Status: SHIPPED | OUTPATIENT
Start: 2025-07-11 | End: 2026-07-11

## 2025-07-11 RX ORDER — VALSARTAN 320 MG/1
320 TABLET ORAL DAILY
Qty: 30 TABLET | Refills: 11 | Status: SHIPPED | OUTPATIENT
Start: 2025-07-11 | End: 2026-07-11

## 2025-07-11 RX ORDER — HYDROCHLOROTHIAZIDE 25 MG/1
25 TABLET ORAL DAILY
Qty: 30 TABLET | Refills: 11 | Status: SHIPPED | OUTPATIENT
Start: 2025-07-11 | End: 2026-07-11

## 2025-07-11 NOTE — PATIENT INSTRUCTIONS
Medication(s) have been refilled as detailed below. Please  at pharmacy and take as prescribed.     Labs have been ordered. Please have completed at your earliest convenience when fasting (no food or beverage besides water for at least 8 hours).     Check blood pressure at home. If persistently >140/90 please let me know.     Try the following for a healthy lifestyle:  Diet: Aim to eat a diet with plenty of vegetables, fruits, fiber, and protein. Eat less sugar and simple carbohydrates. Consider a Mediterranean diet.   Water: Ensure you drink plenty of water each day. Avoid beverages other than water or plain unsweetened coffee or tea. Reduce intake of alcoholic beverages.  Exercise: Strive for at least 150-300 minutes of light-moderate intensity exercise OR  minutes of high intensity exercise per week. You  may join a gym or formal exercise program, or you can exercise at home with free fitness videos on [employer], or regular walking.  Sleep: Make sure you get plenty of sleep - at least 7-9 hours nightly   Health maintenance: Stay up to date on immunizations and screenings.

## 2025-07-11 NOTE — ASSESSMENT & PLAN NOTE
Blood pressure above goal today - 148/90  Current regimen hydrochlorothiazide 25 mg + valsartan 320 mg  Recommend monitoring at home  If elevations persist at home, consider adding 3rd medication for better control

## 2025-07-11 NOTE — PROGRESS NOTES
[facility] ESTABLISH CARE VISIT      CHIEF COMPLAINT:   Chief Complaint   Patient presents with    Establish Care       HISTORY OF PRESENT ILLNESS: Nain Jain is a 44 y.o. male who presents here today to establish care.     Chronic medical issues:  - Depression - fluoxetine - satisfied with regimen   - Insomnia - quetiapine  - satisfied with regimen   - DM - metformin - 500 BID; glipizide rx - admits he only takes if blood sugar high   - HLD - rosuvastatin   - HTN - hctz, valsartan   - Herpes keratitis - acyclovir, follows with eye doctor     Health maintenance  - Due for labs  - Vaccines - due for tdap  - DM - due for foot exam, urine screening       REVIEW OF SYSTEMS:    ROS as in HPI.       PAST MEDICAL HISTORY:  Past Medical History:   Diagnosis Date    Class 3 severe obesity due to excess calories with serious comorbidity and body mass index (BMI) of 45.0 to 49.9 in adult 07/11/2025    Diabetes mellitus type 2 in obese 04/20/2017    Herpes keratitis 10/09/2017    Hyperlipidemia associated with type 2 diabetes mellitus 09/20/2019    Hypertension associated with diabetes 04/20/2017    Insomnia 06/18/2023    Obstructive sleep apnea     Recurrent major depressive disorder, in full remission        MEDICATIONS:    Current Outpatient Medications:     glipiZIDE 5 MG TR24, Take 2 tablets (10 mg total) by mouth daily with breakfast., Disp: 180 tablet, Rfl: 3    polyethylene glycol (GLYCOLAX) 17 gram PwPk, Take 17 g by mouth daily as needed for Constipation., Disp: 10 each, Rfl: 2    senna-docusate 8.6-50 mg (PERICOLACE) 8.6-50 mg per tablet, Take 1-2 tablets daily to ensure soft stools and prevent constipation.  You may skip this medication if you develop diarrhea., Disp: 60 tablet, Rfl: 2    acyclovir (ZOVIRAX) 400 MG tablet, Take 1 tablet (400 mg total) by mouth 2 (two) times daily., Disp: 60 tablet, Rfl: 0    FLUoxetine 20 MG capsule, Take 1 capsule (20 mg total) by mouth once daily., Disp: 90  capsule, Rfl: 3    hydroCHLOROthiazide (HYDRODIURIL) 25 MG tablet, Take 1 tablet (25 mg total) by mouth once daily., Disp: 30 tablet, Rfl: 11    metFORMIN (GLUCOPHAGE-XR) 500 MG ER 24hr tablet, Take 1 tablet (500 mg total) by mouth 2 (two) times daily with meals., Disp: 60 tablet, Rfl: 11    QUEtiapine (SEROQUEL) 100 MG Tab, Take 1 tablet (100 mg total) by mouth nightly., Disp: 90 tablet, Rfl: 3    rosuvastatin (CRESTOR) 40 MG Tab, Take 1 tablet (40 mg total) by mouth every evening., Disp: 30 tablet, Rfl: 11    valsartan (DIOVAN) 320 MG tablet, Take 1 tablet (320 mg total) by mouth once daily., Disp: 30 tablet, Rfl: 11  No current facility-administered medications for this visit.      PAST SURGICAL HISTORY:  Past Surgical History:   Procedure Laterality Date    COLONOSCOPY N/A 8/16/2024    Procedure: COLONOSCOPY;  Surgeon: Fernando Holman MD;  Location: [address]);  Service: Endoscopy;  Laterality: N/A;  ref by Dr. Mathews/ [facility] portal-RB  8/15-LV for precall-Kpvt    EXCISION OF MASS OF ABDOMEN N/A 4/22/2019    Procedure: EXCISION, MASS, ABDOMEN;  Surgeon: Pj Saucedo MD;  Location: [facility];  Service: General;  Laterality: N/A;    NO PAST SURGERIES         SOCIAL HISTORY:  Social History     Tobacco Use    Smoking status: Never    Smokeless tobacco: Never   Vaping Use    Vaping status: Never Used   Substance Use Topics    Alcohol use: Yes     Alcohol/week: 4.0 standard drinks of alcohol     Types: 4 Drinks containing 0.5 oz of alcohol per week    Drug use: No         ALLERGIES:  Review of patient's allergies indicates:  No Known Allergies      FAMILY HISTORY:  Family History   Problem Relation Name Age of Onset    Diabetes Mother      Obesity Mother      Hypertension Mother      Diabetes Maternal Grandmother      COPD Father          smoker    No Known Problems Sister      No Known Problems Brother      No Known Problems Brother      No Known Problems Sister      No Known Problems  "Sister           PHYSICAL EXAM:    BP (!) 148/90   Pulse 82   Ht 5' 9" (1.753 m)   Wt (!) 139.9 kg (308 lb 6.8 oz)   SpO2 98%   BMI 45.55 kg/m²     Physical Exam  Vitals and nursing note reviewed.   Constitutional:       General: He is not in acute distress.     Appearance: Normal appearance. He is not ill-appearing, toxic-appearing or diaphoretic.   HENT:      Head: Normocephalic and atraumatic.      Nose: Nose normal.   Eyes:      Extraocular Movements: Extraocular movements intact.      Conjunctiva/sclera: Conjunctivae normal.      Pupils: Pupils are equal, round, and reactive to light.   Cardiovascular:      Rate and Rhythm: Normal rate and regular rhythm.      Heart sounds: Normal heart sounds. No murmur heard.  Pulmonary:      Effort: Pulmonary effort is normal. No respiratory distress.      Breath sounds: Normal breath sounds. No wheezing.   Musculoskeletal:         General: No deformity. Normal range of motion.   Skin:     Findings: No lesion or rash.   Neurological:      General: No focal deficit present.      Mental Status: He is alert.      Motor: No weakness.      Gait: Gait normal.   Psychiatric:         Mood and Affect: Mood normal.         Behavior: Behavior normal.         Thought Content: Thought content normal.         Judgment: Judgment normal.               ASSESSMENT & PLAN:    1. Diabetes mellitus type 2 in obese  Assessment & Plan:  Last A1c 7.3% in 2022  Current regimen metformin 500 mg twice daily, compliant  Also prescribed glipizide 10 mg daily, admits he is not taking this regularly  Rosuvastatin for cardiac protection  Valsartan for kidney protection  Eye exam up-to-date  Foot exam completed  Check updated A1c  Adjust treatment accordingly  Orders:  -     Hemoglobin A1C; Future; Expected date: 07/11/2025  -     Microalbumin/Creatinine Ratio, Urine  -     metFORMIN (GLUCOPHAGE-XR) 500 MG ER 24hr tablet; Take 1 tablet (500 mg total) by mouth 2 (two) times daily with meals.  Dispense: " 60 tablet; Refill: 11      2. Hypertension associated with diabetes  Assessment & Plan:  Blood pressure above goal today - 148/90  Current regimen hydrochlorothiazide 25 mg + valsartan 320 mg  Recommend monitoring at home  If elevations persist at home, consider adding 3rd medication for better control   Orders:  -     Comprehensive Metabolic Panel; Future; Expected date: 07/11/2025  -     hydroCHLOROthiazide (HYDRODIURIL) 25 MG tablet; Take 1 tablet (25 mg total) by mouth once daily.  Dispense: 30 tablet; Refill: 11  -     valsartan (DIOVAN) 320 MG tablet; Take 1 tablet (320 mg total) by mouth once daily.  Dispense: 30 tablet; Refill: 11      3. Hyperlipidemia associated with type 2 diabetes mellitus  Assessment & Plan:  Check updated cholesterol panel  Continue atorvastatin meantime  Orders:  -     Lipid Panel; Future; Expected date: 07/11/2025  -     TSH; Future; Expected date: 07/11/2025  -     rosuvastatin (CRESTOR) 40 MG Tab; Take 1 tablet (40 mg total) by mouth every evening.  Dispense: 30 tablet; Refill: 11      4. Class 3 severe obesity due to excess calories with serious comorbidity and body mass index (BMI) of 45.0 to 49.9 in adult  Assessment & Plan:  BMI 45.5, weight 308 lb  Weight loss noted  Comorbid conditions include diabetes, hypertension, hyperlipidemia, sleep apnea  Updated labs as detailed  Provided information on healthy lifestyle      5. Recurrent major depressive disorder, in full remission  Assessment & Plan:  Currently well controlled with fluoxetine 40 mg, continue  Orders:  -     FLUoxetine 20 MG capsule; Take 1 capsule (20 mg total) by mouth once daily.  Dispense: 90 capsule; Refill: 3      6. Insomnia, unspecified type  Assessment & Plan:  Well controlled with quetiapine, continue  Orders:  -     QUEtiapine (SEROQUEL) 100 MG Tab; Take 1 tablet (100 mg total) by mouth nightly.  Dispense: 90 tablet; Refill: 3      7. Obstructive sleep apnea  Assessment & Plan:  Compliant with CPAP,  continue      8. Normocytic anemia  Assessment & Plan:  Check updated CBC  Orders:  -     CBC Auto Differential; Future; Expected date: 07/11/2025      9. Thrombocytosis  Assessment & Plan:  Check updated CBC  Orders:  -     CBC Auto Differential; Future; Expected date: 07/11/2025      10. Herpes keratitis  Assessment & Plan:  Following with eye doctor, on acyclovir prn  Continue current treatment plan      11. Need for vaccination  -     Tdap (BOOSTRIX) vaccine injection 0.5 mL              Vickie Majano MD  [facility]

## 2025-07-11 NOTE — ASSESSMENT & PLAN NOTE
Last A1c 7.3% in 2022  Current regimen metformin 500 mg twice daily, compliant  Also prescribed glipizide 10 mg daily, admits he is not taking this regularly  Rosuvastatin for cardiac protection  Valsartan for kidney protection  Eye exam up-to-date  Foot exam completed  Check updated A1c  Adjust treatment accordingly

## 2025-07-11 NOTE — ASSESSMENT & PLAN NOTE
BMI 45.5, weight 308 lb  Weight loss noted  Comorbid conditions include diabetes, hypertension, hyperlipidemia, sleep apnea  Updated labs as detailed  Provided information on healthy lifestyle

## 2025-07-20 ENCOUNTER — PATIENT MESSAGE (OUTPATIENT)
Dept: ADMINISTRATIVE | Facility: HOSPITAL | Age: 45
End: 2025-07-20
Payer: COMMERCIAL

## 2025-07-23 ENCOUNTER — PATIENT MESSAGE (OUTPATIENT)
Dept: INTERNAL MEDICINE | Facility: CLINIC | Age: 45
End: 2025-07-23
Payer: COMMERCIAL

## 2025-07-23 ENCOUNTER — TELEPHONE (OUTPATIENT)
Dept: INTERNAL MEDICINE | Facility: CLINIC | Age: 45
End: 2025-07-23
Payer: COMMERCIAL

## 2025-07-23 NOTE — TELEPHONE ENCOUNTER
----- Message from Vickie Majano MD sent at 7/23/2025 10:08 AM CDT -----  Regarding: labs  Hello, this patient missed his lab appointment can you please help him reschedule? Thank you!

## 2025-08-04 ENCOUNTER — PATIENT MESSAGE (OUTPATIENT)
Dept: INTERNAL MEDICINE | Facility: CLINIC | Age: 45
End: 2025-08-04
Payer: COMMERCIAL

## 2025-08-05 ENCOUNTER — NURSE TRIAGE (OUTPATIENT)
Dept: ADMINISTRATIVE | Facility: CLINIC | Age: 45
End: 2025-08-05
Payer: COMMERCIAL

## 2025-08-05 ENCOUNTER — LAB VISIT (OUTPATIENT)
Dept: LAB | Facility: HOSPITAL | Age: 45
End: 2025-08-05
Payer: COMMERCIAL

## 2025-08-05 ENCOUNTER — OFFICE VISIT (OUTPATIENT)
Dept: INTERNAL MEDICINE | Facility: CLINIC | Age: 45
End: 2025-08-05
Payer: COMMERCIAL

## 2025-08-05 VITALS
BODY MASS INDEX: 44.15 KG/M2 | WEIGHT: 298.06 LBS | DIASTOLIC BLOOD PRESSURE: 94 MMHG | SYSTOLIC BLOOD PRESSURE: 148 MMHG | HEIGHT: 69 IN

## 2025-08-05 DIAGNOSIS — E66.813 CLASS 3 SEVERE OBESITY DUE TO EXCESS CALORIES WITH SERIOUS COMORBIDITY AND BODY MASS INDEX (BMI) OF 45.0 TO 49.9 IN ADULT: ICD-10-CM

## 2025-08-05 DIAGNOSIS — E11.69 DIABETES MELLITUS TYPE 2 IN OBESE: ICD-10-CM

## 2025-08-05 DIAGNOSIS — E66.9 DIABETES MELLITUS TYPE 2 IN OBESE: ICD-10-CM

## 2025-08-05 DIAGNOSIS — D75.839 THROMBOCYTOSIS: ICD-10-CM

## 2025-08-05 DIAGNOSIS — I15.2 HYPERTENSION ASSOCIATED WITH DIABETES: ICD-10-CM

## 2025-08-05 DIAGNOSIS — E78.5 HYPERLIPIDEMIA ASSOCIATED WITH TYPE 2 DIABETES MELLITUS: ICD-10-CM

## 2025-08-05 DIAGNOSIS — I15.2 HYPERTENSION ASSOCIATED WITH DIABETES: Primary | ICD-10-CM

## 2025-08-05 DIAGNOSIS — E11.59 HYPERTENSION ASSOCIATED WITH DIABETES: Primary | ICD-10-CM

## 2025-08-05 DIAGNOSIS — E11.69 TYPE 2 DIABETES MELLITUS WITH OTHER SPECIFIED COMPLICATION, WITHOUT LONG-TERM CURRENT USE OF INSULIN: ICD-10-CM

## 2025-08-05 DIAGNOSIS — E11.69 HYPERLIPIDEMIA ASSOCIATED WITH TYPE 2 DIABETES MELLITUS: ICD-10-CM

## 2025-08-05 DIAGNOSIS — G47.33 OBSTRUCTIVE SLEEP APNEA: ICD-10-CM

## 2025-08-05 DIAGNOSIS — D64.9 NORMOCYTIC ANEMIA: ICD-10-CM

## 2025-08-05 DIAGNOSIS — E11.59 HYPERTENSION ASSOCIATED WITH DIABETES: ICD-10-CM

## 2025-08-05 LAB
ABSOLUTE EOSINOPHIL (OHS): 0.17 K/UL
ABSOLUTE MONOCYTE (OHS): 0.58 K/UL (ref 0.3–1)
ABSOLUTE NEUTROPHIL COUNT (OHS): 2.96 K/UL (ref 1.8–7.7)
ALBUMIN SERPL BCP-MCNC: 4 G/DL (ref 3.5–5.2)
ALP SERPL-CCNC: 78 UNIT/L (ref 40–150)
ALT SERPL W/O P-5'-P-CCNC: 34 UNIT/L (ref 0–55)
ANION GAP (OHS): 14 MMOL/L (ref 8–16)
AST SERPL-CCNC: 29 UNIT/L (ref 0–50)
BASOPHILS # BLD AUTO: 0.03 K/UL
BASOPHILS NFR BLD AUTO: 0.5 %
BILIRUB SERPL-MCNC: 1.2 MG/DL (ref 0.1–1)
BUN SERPL-MCNC: 7 MG/DL (ref 6–20)
CALCIUM SERPL-MCNC: 9.9 MG/DL (ref 8.7–10.5)
CHLORIDE SERPL-SCNC: 102 MMOL/L (ref 95–110)
CHOLEST SERPL-MCNC: 257 MG/DL (ref 120–199)
CHOLEST/HDLC SERPL: 6.1 {RATIO} (ref 2–5)
CO2 SERPL-SCNC: 22 MMOL/L (ref 23–29)
CREAT SERPL-MCNC: 0.8 MG/DL (ref 0.5–1.4)
EAG (OHS): 269 MG/DL (ref 68–131)
ERYTHROCYTE [DISTWIDTH] IN BLOOD BY AUTOMATED COUNT: 12 % (ref 11.5–14.5)
GFR SERPLBLD CREATININE-BSD FMLA CKD-EPI: >60 ML/MIN/1.73/M2
GLUCOSE SERPL-MCNC: 198 MG/DL (ref 70–110)
HBA1C MFR BLD: 11 % (ref 4–5.6)
HCT VFR BLD AUTO: 39.2 % (ref 40–54)
HDLC SERPL-MCNC: 42 MG/DL (ref 40–75)
HDLC SERPL: 16.3 % (ref 20–50)
HGB BLD-MCNC: 14 GM/DL (ref 14–18)
IMM GRANULOCYTES # BLD AUTO: 0.02 K/UL (ref 0–0.04)
IMM GRANULOCYTES NFR BLD AUTO: 0.3 % (ref 0–0.5)
LDLC SERPL CALC-MCNC: 182 MG/DL (ref 63–159)
LYMPHOCYTES # BLD AUTO: 1.99 K/UL (ref 1–4.8)
MCH RBC QN AUTO: 32.3 PG (ref 27–31)
MCHC RBC AUTO-ENTMCNC: 35.7 G/DL (ref 32–36)
MCV RBC AUTO: 91 FL (ref 82–98)
NONHDLC SERPL-MCNC: 215 MG/DL
NUCLEATED RBC (/100WBC) (OHS): 0 /100 WBC
PLATELET # BLD AUTO: 335 K/UL (ref 150–450)
PMV BLD AUTO: 9.9 FL (ref 9.2–12.9)
POTASSIUM SERPL-SCNC: 3.8 MMOL/L (ref 3.5–5.1)
PROT SERPL-MCNC: 7.4 GM/DL (ref 6–8.4)
RBC # BLD AUTO: 4.33 M/UL (ref 4.6–6.2)
RELATIVE EOSINOPHIL (OHS): 3 %
RELATIVE LYMPHOCYTE (OHS): 34.6 % (ref 18–48)
RELATIVE MONOCYTE (OHS): 10.1 % (ref 4–15)
RELATIVE NEUTROPHIL (OHS): 51.5 % (ref 38–73)
SODIUM SERPL-SCNC: 138 MMOL/L (ref 136–145)
TRIGL SERPL-MCNC: 165 MG/DL (ref 30–150)
TSH SERPL-ACNC: 0.76 UIU/ML (ref 0.4–4)
WBC # BLD AUTO: 5.75 K/UL (ref 3.9–12.7)

## 2025-08-05 PROCEDURE — 99999 PR PBB SHADOW E&M-EST. PATIENT-LVL IV: CPT | Mod: PBBFAC,,, | Performed by: NURSE PRACTITIONER

## 2025-08-05 PROCEDURE — 4010F ACE/ARB THERAPY RXD/TAKEN: CPT | Mod: CPTII,S$GLB,, | Performed by: NURSE PRACTITIONER

## 2025-08-05 PROCEDURE — 3077F SYST BP >= 140 MM HG: CPT | Mod: CPTII,S$GLB,, | Performed by: NURSE PRACTITIONER

## 2025-08-05 PROCEDURE — 3061F NEG MICROALBUMINURIA REV: CPT | Mod: CPTII,S$GLB,, | Performed by: NURSE PRACTITIONER

## 2025-08-05 PROCEDURE — 84443 ASSAY THYROID STIM HORMONE: CPT

## 2025-08-05 PROCEDURE — 80053 COMPREHEN METABOLIC PANEL: CPT

## 2025-08-05 PROCEDURE — 83036 HEMOGLOBIN GLYCOSYLATED A1C: CPT

## 2025-08-05 PROCEDURE — 36415 COLL VENOUS BLD VENIPUNCTURE: CPT

## 2025-08-05 PROCEDURE — 3080F DIAST BP >= 90 MM HG: CPT | Mod: CPTII,S$GLB,, | Performed by: NURSE PRACTITIONER

## 2025-08-05 PROCEDURE — 85025 COMPLETE CBC W/AUTO DIFF WBC: CPT

## 2025-08-05 PROCEDURE — 82465 ASSAY BLD/SERUM CHOLESTEROL: CPT

## 2025-08-05 PROCEDURE — 1160F RVW MEDS BY RX/DR IN RCRD: CPT | Mod: CPTII,S$GLB,, | Performed by: NURSE PRACTITIONER

## 2025-08-05 PROCEDURE — 1159F MED LIST DOCD IN RCRD: CPT | Mod: CPTII,S$GLB,, | Performed by: NURSE PRACTITIONER

## 2025-08-05 PROCEDURE — 99214 OFFICE O/P EST MOD 30 MIN: CPT | Mod: S$GLB,,, | Performed by: NURSE PRACTITIONER

## 2025-08-05 PROCEDURE — 3066F NEPHROPATHY DOC TX: CPT | Mod: CPTII,S$GLB,, | Performed by: NURSE PRACTITIONER

## 2025-08-05 PROCEDURE — 3008F BODY MASS INDEX DOCD: CPT | Mod: CPTII,S$GLB,, | Performed by: NURSE PRACTITIONER

## 2025-08-05 RX ORDER — AMLODIPINE BESYLATE 5 MG/1
TABLET ORAL
Qty: 60 TABLET | Refills: 1 | Status: SHIPPED | OUTPATIENT
Start: 2025-08-05

## 2025-08-05 NOTE — PATIENT INSTRUCTIONS
Start with one dose of the Amlodipine for about 5-7 days.  If blood pressure is still >139/89 (either number) increase to two tablets.  I will touch base in one week to see how your blood pressure is doing.

## 2025-08-05 NOTE — PROGRESS NOTES
History of Present Illness   Nain Jain is a 44 y.o. man with medical history as listed below who presents today for evaluation of uncontrolled hypertension despite taking medication as prescribed. His BP has ranged 140-170/ over the last few weeks. He had a physical for his job which he failed due to elevation in BP. He does report a headache associated with elevation. He admits to not monitoring the sodium in his diet. He has no additional complaints and is otherwise healthy on today's visit.    Of note, it has been quite some time since his last labs, but he did have updated blood work today.    Past Medical History:   Diagnosis Date    Class 3 severe obesity due to excess calories with serious comorbidity and body mass index (BMI) of 45.0 to 49.9 in adult 07/11/2025    Diabetes mellitus type 2 in obese 04/20/2017    Herpes keratitis 10/09/2017    Hyperlipidemia associated with type 2 diabetes mellitus 09/20/2019    Hypertension associated with diabetes 04/20/2017    Insomnia 06/18/2023    Obstructive sleep apnea     Recurrent major depressive disorder, in full remission        Past Surgical History:   Procedure Laterality Date    COLONOSCOPY N/A 08/16/2024    Procedure: COLONOSCOPY;  Surgeon: Fernando Holman MD;  Location: [address]);  Service: Endoscopy;  Laterality: N/A;  ref by Dr. Mathews/ [facility]-  8/15-LV for precall-Kpvt    EXCISION OF MASS OF ABDOMEN N/A 04/22/2019    Procedure: EXCISION, MASS, ABDOMEN;  Surgeon: Pj Saucedo MD;  Location: [facility];  Service: General;  Laterality: N/A;       Social History     Socioeconomic History    Marital status: Single   Occupational History    Occupation:    Tobacco Use    Smoking status: Never    Smokeless tobacco: Never   Vaping Use    Vaping status: Never Used   Substance and Sexual Activity    Alcohol use: Yes     Alcohol/week: 4.0 standard drinks of alcohol     Types: 4 Drinks containing 0.5 oz of  "alcohol per week    Drug use: No   Other Topics Concern    Patient feels they ought to cut down on drinking/drug use No    Patient annoyed by others criticizing their drinking/drug use No    Patient has felt bad or guilty about drinking/drug use No    Patient has had a drink/used drugs as an eye opener in the AM No   Social History Narrative    Lives w/wife and 1 child.         Family History   Problem Relation Name Age of Onset    Diabetes Mother      Obesity Mother      Hypertension Mother      Diabetes Maternal Grandmother      COPD Father          smoker    No Known Problems Sister      No Known Problems Brother      No Known Problems Brother      No Known Problems Sister      No Known Problems Sister         Review of Systems  Review of Systems   Constitutional:  Negative for malaise/fatigue and weight loss.   Eyes:  Negative for blurred vision and double vision.   Respiratory:  Negative for shortness of breath.    Cardiovascular:  Negative for chest pain, palpitations, orthopnea, claudication, leg swelling and PND.   Gastrointestinal:  Negative for blood in stool and melena.   Genitourinary:  Negative for flank pain and hematuria.   Neurological:  Positive for headaches. Negative for dizziness and loss of consciousness.   Endo/Heme/Allergies:  Negative for polydipsia.     A complete review of systems was otherwise negative.    Physical Exam  BP (!) 148/94 (BP Location: Right arm, Patient Position: Sitting)   Ht 5' 9" (1.753 m)   Wt 135.2 kg (298 lb 1 oz)   BMI 44.02 kg/m²   General appearance: alert, appears stated age, cooperative, and no distress  Neck: no adenopathy, no carotid bruit, no JVD, supple, symmetrical, trachea midline, and thyroid not enlarged, symmetric, no tenderness/mass/nodules  Lungs: clear to auscultation bilaterally  Heart: regular rate and rhythm, S1, S2 normal, no murmur, click, rub or gallop  Extremities: extremities normal, atraumatic, no cyanosis or edema  Pulses: 2+ and " symmetric  Skin: Skin color, texture, turgor normal. No rashes or lesions  Neurologic: Grossly normal    Assessment/Plan  Hypertension associated with diabetes  Add CCB, start with 5 mg and increase to 10 mg if BP remains elevated.  Cut back on the sodium in the diet.  Improve exercise habits-goal of 30 minutes per day.  Return in one week for nurse BP check.  -     amLODIPine (NORVASC) 5 MG tablet; Take 1-2 tabletsBY MOUTH daily for BP, goal BP <139/89 mmHg  Dispense: 60 tablet; Refill: 1    Hyperlipidemia associated with type 2 diabetes mellitus  Statin therapy.  Labs today.    Type 2 diabetes mellitus with other specified complication, without long-term current use of insulin  Metformin.  Labs today.    Class 3 severe obesity due to excess calories with serious comorbidity and body mass index (BMI) of 45.0 to 49.9 in adult  The patient is asked to make an attempt to improve diet and exercise patterns to aid in medical management of this problem. We specifically discussed cutting calorie intake by 500-1000 calories per day for a goal of a 1-2 pound weight loss per week and recommendations for a mostly plant based diet with limited red meats/refined grains/processed foods/added sugars.    Obstructive sleep apnea  Wearing CPAP nightly >4 hours.  Discussed importance of continued compliance to optimize HTN management.    Patient has verbalized understanding and is in agreement with plan of care.    Follow up in about 1 week (around 8/12/2025) for BP check.          Follow up in about 1 week (around 8/12/2025) for BP check.

## 2025-08-05 NOTE — TELEPHONE ENCOUNTER
Pt states that he needs to get his BP checked. He believes his BP is high and is currently at [facility] getting blood work done. Pt c/o headache 6/10 now. Pt requesting appt. Advise to be seen today per protocol. Appt scheduled per protocol. Encounter routed to provider.   Reason for Disposition   Patient wants to be seen    Additional Information   Negative: Difficult to awaken or acting confused (e.g., disoriented, slurred speech)   Negative: Weakness of the face, arm or leg on one side of the body and new-onset   Negative: Numbness of the face, arm or leg on one side of the body and new-onset   Negative: Loss of speech or garbled speech and new-onset   Negative: Passed out (e.g., fainted, lost consciousness, blacked out and was not responding)   Negative: Sounds like a life-threatening emergency to the triager   Negative: Unable to walk without falling   Negative: Stiff neck (can't touch chin to chest)   Negative: Other family members (or people in same household) with headaches and possibility of carbon monoxide exposure   Negative: SEVERE headache, states 'worst headache' of life   Negative: SEVERE headache, sudden-onset (i.e., reaching maximum intensity within seconds to 1 hour)   Negative: Severe pain in one eye   Negative: Loss of vision or double vision (Exception: Same as previously diagnosed migraines.)   Negative: Patient sounds very sick or weak to the triager   Negative: Fever > 103 F (39.4 C)   Negative: Fever > 100 F (37.8 C) and has diabetes mellitus or a weak immune system (e.g., HIV positive, cancer chemotherapy, organ transplant, splenectomy, chronic steroids)   Negative: SEVERE headache (e.g., excruciating) and has had severe headaches before   Negative: SEVERE headache and not relieved by pain meds   Negative: SEVERE headache and vomiting   Negative: SEVERE headache and fever   Negative: New-onset headache and weak immune system (e.g., HIV positive, cancer chemo, splenectomy, organ  transplant, chronic steroids)   Negative: Fever present > 3 days (72 hours)    Protocols used: Headache-A-[place]

## 2025-08-08 ENCOUNTER — TELEPHONE (OUTPATIENT)
Dept: INTERNAL MEDICINE | Facility: CLINIC | Age: 45
End: 2025-08-08
Payer: COMMERCIAL

## 2025-08-11 ENCOUNTER — OFFICE VISIT (OUTPATIENT)
Dept: INTERNAL MEDICINE | Facility: CLINIC | Age: 45
End: 2025-08-11
Payer: COMMERCIAL

## 2025-08-11 VITALS
WEIGHT: 298.94 LBS | DIASTOLIC BLOOD PRESSURE: 70 MMHG | BODY MASS INDEX: 44.15 KG/M2 | SYSTOLIC BLOOD PRESSURE: 130 MMHG | HEART RATE: 108 BPM | OXYGEN SATURATION: 98 %

## 2025-08-11 DIAGNOSIS — I15.2 HYPERTENSION ASSOCIATED WITH DIABETES: ICD-10-CM

## 2025-08-11 DIAGNOSIS — E78.5 HYPERLIPIDEMIA ASSOCIATED WITH TYPE 2 DIABETES MELLITUS: ICD-10-CM

## 2025-08-11 DIAGNOSIS — E66.9 DIABETES MELLITUS TYPE 2 IN OBESE: Primary | ICD-10-CM

## 2025-08-11 DIAGNOSIS — E11.59 HYPERTENSION ASSOCIATED WITH DIABETES: ICD-10-CM

## 2025-08-11 DIAGNOSIS — E11.69 HYPERLIPIDEMIA ASSOCIATED WITH TYPE 2 DIABETES MELLITUS: ICD-10-CM

## 2025-08-11 DIAGNOSIS — R80.9 MICROALBUMINURIA DUE TO TYPE 2 DIABETES MELLITUS: ICD-10-CM

## 2025-08-11 DIAGNOSIS — E11.69 DIABETES MELLITUS TYPE 2 IN OBESE: Primary | ICD-10-CM

## 2025-08-11 DIAGNOSIS — E11.29 MICROALBUMINURIA DUE TO TYPE 2 DIABETES MELLITUS: ICD-10-CM

## 2025-08-11 PROBLEM — D64.9 NORMOCYTIC ANEMIA: Status: RESOLVED | Noted: 2018-01-31 | Resolved: 2025-08-11

## 2025-08-11 PROBLEM — D75.839 THROMBOCYTOSIS: Status: RESOLVED | Noted: 2018-01-31 | Resolved: 2025-08-11

## 2025-08-11 LAB
BACTERIA #/AREA URNS AUTO: ABNORMAL /HPF
BILIRUB SERPL-MCNC: NORMAL MG/DL
BILIRUB UR QL STRIP.AUTO: NEGATIVE
BLOOD, POC UA: NEGATIVE
CLARITY UR: ABNORMAL
COLOR UR AUTO: YELLOW
GLUCOSE UR QL STRIP: ABNORMAL
GLUCOSE UR QL STRIP: NEGATIVE
HGB UR QL STRIP: NEGATIVE
HYALINE CASTS UR QL AUTO: 8 /LPF (ref 0–1)
KETONES UR QL STRIP: ABNORMAL
KETONES UR QL STRIP: NORMAL
LEUKOCYTE ESTERASE UR QL STRIP: NEGATIVE
LEUKOCYTE ESTERASE URINE, POC: NEGATIVE
MICROSCOPIC COMMENT: ABNORMAL
NITRITE UR QL STRIP: NEGATIVE
NITRITE, POC UA: NEGATIVE
PH UR STRIP: 7 [PH]
PH, POC UA: 6.5
PROT UR QL STRIP: ABNORMAL
PROTEIN, POC: NORMAL
RBC #/AREA URNS AUTO: 1 /HPF (ref 0–4)
SP GR UR STRIP: 1.02
SPECIFIC GRAVITY, POC UA: >=1.03
SQUAMOUS #/AREA URNS AUTO: 1 /HPF
UROBILINOGEN UR STRIP-ACNC: NEGATIVE EU/DL
UROBILINOGEN, POC UA: NORMAL
WBC #/AREA URNS AUTO: 4 /HPF (ref 0–5)

## 2025-08-11 PROCEDURE — 1159F MED LIST DOCD IN RCRD: CPT | Mod: CPTII,S$GLB,, | Performed by: STUDENT IN AN ORGANIZED HEALTH CARE EDUCATION/TRAINING PROGRAM

## 2025-08-11 PROCEDURE — 3078F DIAST BP <80 MM HG: CPT | Mod: CPTII,S$GLB,, | Performed by: STUDENT IN AN ORGANIZED HEALTH CARE EDUCATION/TRAINING PROGRAM

## 2025-08-11 PROCEDURE — 3061F NEG MICROALBUMINURIA REV: CPT | Mod: CPTII,S$GLB,, | Performed by: STUDENT IN AN ORGANIZED HEALTH CARE EDUCATION/TRAINING PROGRAM

## 2025-08-11 PROCEDURE — G2211 COMPLEX E/M VISIT ADD ON: HCPCS | Mod: S$GLB,,, | Performed by: STUDENT IN AN ORGANIZED HEALTH CARE EDUCATION/TRAINING PROGRAM

## 2025-08-11 PROCEDURE — 3066F NEPHROPATHY DOC TX: CPT | Mod: CPTII,S$GLB,, | Performed by: STUDENT IN AN ORGANIZED HEALTH CARE EDUCATION/TRAINING PROGRAM

## 2025-08-11 PROCEDURE — 3008F BODY MASS INDEX DOCD: CPT | Mod: CPTII,S$GLB,, | Performed by: STUDENT IN AN ORGANIZED HEALTH CARE EDUCATION/TRAINING PROGRAM

## 2025-08-11 PROCEDURE — 99999 PR PBB SHADOW E&M-EST. PATIENT-LVL V: CPT | Mod: PBBFAC,,, | Performed by: STUDENT IN AN ORGANIZED HEALTH CARE EDUCATION/TRAINING PROGRAM

## 2025-08-11 PROCEDURE — 3075F SYST BP GE 130 - 139MM HG: CPT | Mod: CPTII,S$GLB,, | Performed by: STUDENT IN AN ORGANIZED HEALTH CARE EDUCATION/TRAINING PROGRAM

## 2025-08-11 PROCEDURE — 1160F RVW MEDS BY RX/DR IN RCRD: CPT | Mod: CPTII,S$GLB,, | Performed by: STUDENT IN AN ORGANIZED HEALTH CARE EDUCATION/TRAINING PROGRAM

## 2025-08-11 PROCEDURE — 81003 URINALYSIS AUTO W/O SCOPE: CPT | Performed by: STUDENT IN AN ORGANIZED HEALTH CARE EDUCATION/TRAINING PROGRAM

## 2025-08-11 PROCEDURE — 99214 OFFICE O/P EST MOD 30 MIN: CPT | Mod: S$GLB,,, | Performed by: STUDENT IN AN ORGANIZED HEALTH CARE EDUCATION/TRAINING PROGRAM

## 2025-08-11 PROCEDURE — 81003 URINALYSIS AUTO W/O SCOPE: CPT | Mod: QW,S$GLB,, | Performed by: STUDENT IN AN ORGANIZED HEALTH CARE EDUCATION/TRAINING PROGRAM

## 2025-08-11 PROCEDURE — 4010F ACE/ARB THERAPY RXD/TAKEN: CPT | Mod: CPTII,S$GLB,, | Performed by: STUDENT IN AN ORGANIZED HEALTH CARE EDUCATION/TRAINING PROGRAM

## 2025-08-11 PROCEDURE — 3046F HEMOGLOBIN A1C LEVEL >9.0%: CPT | Mod: CPTII,S$GLB,, | Performed by: STUDENT IN AN ORGANIZED HEALTH CARE EDUCATION/TRAINING PROGRAM

## 2025-08-11 RX ORDER — TIRZEPATIDE 2.5 MG/.5ML
2.5 INJECTION, SOLUTION SUBCUTANEOUS
Qty: 2 ML | Refills: 0 | Status: SHIPPED | OUTPATIENT
Start: 2025-08-11 | End: 2025-09-08

## 2025-08-11 RX ORDER — BLOOD-GLUCOSE TRANSMITTER
1 EACH MISCELLANEOUS DAILY
Qty: 1 EACH | Refills: 1 | Status: SHIPPED | OUTPATIENT
Start: 2025-08-11 | End: 2026-08-11

## 2025-08-11 RX ORDER — METFORMIN HYDROCHLORIDE 500 MG/1
1000 TABLET, EXTENDED RELEASE ORAL 2 TIMES DAILY WITH MEALS
Qty: 360 TABLET | Refills: 3 | Status: SHIPPED | OUTPATIENT
Start: 2025-08-11 | End: 2026-08-11

## 2025-08-11 RX ORDER — BLOOD-GLUCOSE SENSOR
1 EACH MISCELLANEOUS DAILY
Qty: 1 EACH | Refills: 1 | Status: SHIPPED | OUTPATIENT
Start: 2025-08-11 | End: 2026-08-11

## 2025-08-11 RX ORDER — BLOOD-GLUCOSE,RECEIVER,CONT
1 EACH MISCELLANEOUS DAILY
Qty: 1 EACH | Refills: 1 | Status: SHIPPED | OUTPATIENT
Start: 2025-08-11 | End: 2026-08-11

## 2025-08-12 LAB — HOLD SPECIMEN: NORMAL

## 2025-08-24 ENCOUNTER — NURSE TRIAGE (OUTPATIENT)
Dept: ADMINISTRATIVE | Facility: CLINIC | Age: 45
End: 2025-08-24
Payer: COMMERCIAL

## 2025-08-24 ENCOUNTER — HOSPITAL ENCOUNTER (EMERGENCY)
Facility: HOSPITAL | Age: 45
Discharge: HOME | End: 2025-08-24
Attending: EMERGENCY MEDICINE
Payer: COMMERCIAL

## 2025-08-24 VITALS
WEIGHT: 290 LBS | OXYGEN SATURATION: 100 % | HEART RATE: 101 BPM | BODY MASS INDEX: 42.95 KG/M2 | SYSTOLIC BLOOD PRESSURE: 152 MMHG | RESPIRATION RATE: 18 BRPM | DIASTOLIC BLOOD PRESSURE: 92 MMHG | TEMPERATURE: 99 F | HEIGHT: 69 IN

## 2025-08-24 DIAGNOSIS — I10 UNCONTROLLED HYPERTENSION: Primary | ICD-10-CM

## 2025-08-24 DIAGNOSIS — Z86.39 HISTORY OF UNCONTROLLED DIABETES: ICD-10-CM

## 2025-08-24 DIAGNOSIS — E78.00 HYPERCHOLESTEROLEMIA: ICD-10-CM

## 2025-08-24 PROCEDURE — 99281 EMR DPT VST MAYX REQ PHY/QHP: CPT

## 2025-08-26 ENCOUNTER — OFFICE VISIT (OUTPATIENT)
Dept: INTERNAL MEDICINE | Facility: CLINIC | Age: 45
End: 2025-08-26
Payer: COMMERCIAL

## 2025-08-26 VITALS
WEIGHT: 297.63 LBS | BODY MASS INDEX: 44.08 KG/M2 | HEIGHT: 69 IN | HEART RATE: 87 BPM | DIASTOLIC BLOOD PRESSURE: 80 MMHG | SYSTOLIC BLOOD PRESSURE: 138 MMHG | OXYGEN SATURATION: 98 %

## 2025-08-26 DIAGNOSIS — E11.29 MICROALBUMINURIA DUE TO TYPE 2 DIABETES MELLITUS: Primary | ICD-10-CM

## 2025-08-26 DIAGNOSIS — E66.9 DIABETES MELLITUS TYPE 2 IN OBESE: ICD-10-CM

## 2025-08-26 DIAGNOSIS — E11.59 HYPERTENSION ASSOCIATED WITH DIABETES: ICD-10-CM

## 2025-08-26 DIAGNOSIS — R80.9 MICROALBUMINURIA DUE TO TYPE 2 DIABETES MELLITUS: Primary | ICD-10-CM

## 2025-08-26 DIAGNOSIS — I15.2 HYPERTENSION ASSOCIATED WITH DIABETES: ICD-10-CM

## 2025-08-26 DIAGNOSIS — E11.69 DIABETES MELLITUS TYPE 2 IN OBESE: ICD-10-CM

## 2025-08-26 LAB
BILIRUB SERPL-MCNC: NEGATIVE MG/DL
BLOOD, POC UA: NEGATIVE
GLUCOSE SERPL-MCNC: 114 MG/DL (ref 70–110)
GLUCOSE UR QL STRIP: NORMAL
KETONES UR QL STRIP: NEGATIVE
LEUKOCYTE ESTERASE URINE, POC: NEGATIVE
NITRITE, POC UA: NEGATIVE
PH, POC UA: 7
PROTEIN, POC: NEGATIVE
SPECIFIC GRAVITY, POC UA: 1.01
UROBILINOGEN, POC UA: NORMAL

## 2025-08-26 PROCEDURE — 1159F MED LIST DOCD IN RCRD: CPT | Mod: CPTII,S$GLB,, | Performed by: STUDENT IN AN ORGANIZED HEALTH CARE EDUCATION/TRAINING PROGRAM

## 2025-08-26 PROCEDURE — 4010F ACE/ARB THERAPY RXD/TAKEN: CPT | Mod: CPTII,S$GLB,, | Performed by: STUDENT IN AN ORGANIZED HEALTH CARE EDUCATION/TRAINING PROGRAM

## 2025-08-26 PROCEDURE — 3008F BODY MASS INDEX DOCD: CPT | Mod: CPTII,S$GLB,, | Performed by: STUDENT IN AN ORGANIZED HEALTH CARE EDUCATION/TRAINING PROGRAM

## 2025-08-26 PROCEDURE — 3079F DIAST BP 80-89 MM HG: CPT | Mod: CPTII,S$GLB,, | Performed by: STUDENT IN AN ORGANIZED HEALTH CARE EDUCATION/TRAINING PROGRAM

## 2025-08-26 PROCEDURE — 3066F NEPHROPATHY DOC TX: CPT | Mod: CPTII,S$GLB,, | Performed by: STUDENT IN AN ORGANIZED HEALTH CARE EDUCATION/TRAINING PROGRAM

## 2025-08-26 PROCEDURE — 99214 OFFICE O/P EST MOD 30 MIN: CPT | Mod: S$GLB,,, | Performed by: STUDENT IN AN ORGANIZED HEALTH CARE EDUCATION/TRAINING PROGRAM

## 2025-08-26 PROCEDURE — 81003 URINALYSIS AUTO W/O SCOPE: CPT | Mod: QW,S$GLB,, | Performed by: STUDENT IN AN ORGANIZED HEALTH CARE EDUCATION/TRAINING PROGRAM

## 2025-08-26 PROCEDURE — 3046F HEMOGLOBIN A1C LEVEL >9.0%: CPT | Mod: CPTII,S$GLB,, | Performed by: STUDENT IN AN ORGANIZED HEALTH CARE EDUCATION/TRAINING PROGRAM

## 2025-08-26 PROCEDURE — 82962 GLUCOSE BLOOD TEST: CPT | Mod: S$GLB,,, | Performed by: STUDENT IN AN ORGANIZED HEALTH CARE EDUCATION/TRAINING PROGRAM

## 2025-08-26 PROCEDURE — 3075F SYST BP GE 130 - 139MM HG: CPT | Mod: CPTII,S$GLB,, | Performed by: STUDENT IN AN ORGANIZED HEALTH CARE EDUCATION/TRAINING PROGRAM

## 2025-08-26 PROCEDURE — 1160F RVW MEDS BY RX/DR IN RCRD: CPT | Mod: CPTII,S$GLB,, | Performed by: STUDENT IN AN ORGANIZED HEALTH CARE EDUCATION/TRAINING PROGRAM

## 2025-08-26 PROCEDURE — 99999 PR PBB SHADOW E&M-EST. PATIENT-LVL V: CPT | Mod: PBBFAC,,, | Performed by: STUDENT IN AN ORGANIZED HEALTH CARE EDUCATION/TRAINING PROGRAM

## 2025-08-26 PROCEDURE — G2211 COMPLEX E/M VISIT ADD ON: HCPCS | Mod: S$GLB,,, | Performed by: STUDENT IN AN ORGANIZED HEALTH CARE EDUCATION/TRAINING PROGRAM

## 2025-08-26 PROCEDURE — 3061F NEG MICROALBUMINURIA REV: CPT | Mod: CPTII,S$GLB,, | Performed by: STUDENT IN AN ORGANIZED HEALTH CARE EDUCATION/TRAINING PROGRAM

## 2025-09-01 DIAGNOSIS — E11.69 DIABETES MELLITUS TYPE 2 IN OBESE: ICD-10-CM

## 2025-09-01 DIAGNOSIS — E66.9 DIABETES MELLITUS TYPE 2 IN OBESE: ICD-10-CM

## 2025-09-01 RX ORDER — TIRZEPATIDE 2.5 MG/.5ML
2.5 INJECTION, SOLUTION SUBCUTANEOUS
Qty: 2 ML | Refills: 0 | Status: CANCELLED | OUTPATIENT
Start: 2025-09-01 | End: 2025-09-29

## 2025-09-02 ENCOUNTER — PATIENT MESSAGE (OUTPATIENT)
Dept: INTERNAL MEDICINE | Facility: CLINIC | Age: 45
End: 2025-09-02
Payer: COMMERCIAL

## 2025-09-02 DIAGNOSIS — E11.69 DIABETES MELLITUS TYPE 2 IN OBESE: ICD-10-CM

## 2025-09-02 DIAGNOSIS — E66.9 DIABETES MELLITUS TYPE 2 IN OBESE: Primary | ICD-10-CM

## 2025-09-02 DIAGNOSIS — E11.69 DIABETES MELLITUS TYPE 2 IN OBESE: Primary | ICD-10-CM

## 2025-09-02 DIAGNOSIS — E66.9 DIABETES MELLITUS TYPE 2 IN OBESE: ICD-10-CM

## 2025-09-02 RX ORDER — TIRZEPATIDE 2.5 MG/.5ML
2.5 INJECTION, SOLUTION SUBCUTANEOUS
Qty: 2 ML | Refills: 0 | Status: CANCELLED | OUTPATIENT
Start: 2025-09-02 | End: 2025-09-30

## 2025-09-03 DIAGNOSIS — E11.69 DIABETES MELLITUS TYPE 2 IN OBESE: ICD-10-CM

## 2025-09-03 DIAGNOSIS — E66.9 DIABETES MELLITUS TYPE 2 IN OBESE: ICD-10-CM

## 2025-09-03 RX ORDER — TIRZEPATIDE 2.5 MG/.5ML
2.5 INJECTION, SOLUTION SUBCUTANEOUS
Qty: 2 ML | Refills: 0 | Status: CANCELLED | OUTPATIENT
Start: 2025-09-02 | End: 2025-09-30

## 2025-09-03 RX ORDER — TIRZEPATIDE 5 MG/.5ML
5 INJECTION, SOLUTION SUBCUTANEOUS
Qty: 2 ML | Refills: 0 | OUTPATIENT
Start: 2025-09-03

## 2025-09-04 ENCOUNTER — ON-DEMAND VIRTUAL (OUTPATIENT)
Dept: URGENT CARE | Facility: CLINIC | Age: 45
End: 2025-09-04
Payer: COMMERCIAL

## 2025-09-04 DIAGNOSIS — E66.9 DIABETES MELLITUS TYPE 2 IN OBESE: ICD-10-CM

## 2025-09-04 DIAGNOSIS — E11.69 DIABETES MELLITUS TYPE 2 IN OBESE: ICD-10-CM

## 2025-09-04 DIAGNOSIS — Z76.0 MEDICATION REFILL: Primary | ICD-10-CM

## 2025-09-04 RX ORDER — TIRZEPATIDE 2.5 MG/.5ML
2.5 INJECTION, SOLUTION SUBCUTANEOUS
Qty: 2 ML | Refills: 0 | Status: SHIPPED | OUTPATIENT
Start: 2025-09-04 | End: 2025-10-03

## 2025-09-04 RX ORDER — TIRZEPATIDE 2.5 MG/.5ML
2.5 INJECTION, SOLUTION SUBCUTANEOUS
Qty: 2 ML | Refills: 0 | Status: CANCELLED | OUTPATIENT
Start: 2025-09-02 | End: 2025-09-30

## 2025-09-04 RX ORDER — TIRZEPATIDE 2.5 MG/.5ML
2.5 INJECTION, SOLUTION SUBCUTANEOUS
Qty: 2 ML | Refills: 0 | Status: CANCELLED | OUTPATIENT
Start: 2025-09-04 | End: 2025-10-02

## (undated) DEVICE — SEE MEDLINE ITEM 156955

## (undated) DEVICE — TAPE ADH MEDIPORE 4 X 10YDS

## (undated) DEVICE — DRAPE ABDOMINAL TIBURON 14X11

## (undated) DEVICE — ELECTRODE REM PLYHSV RETURN 9

## (undated) DEVICE — SUT MCRYL PLUS 4-0 PS2 27IN

## (undated) DEVICE — ADHESIVE MASTISOL VIAL 48/BX

## (undated) DEVICE — NDL 18GA X1 1/2 REG BEVEL

## (undated) DEVICE — APPLICATOR CHLORAPREP ORN 26ML

## (undated) DEVICE — GAUZE SPONGE 4X4 12PLY

## (undated) DEVICE — SUT VICRYL 3-0 27 SH

## (undated) DEVICE — SEE MEDLINE ITEM 157116

## (undated) DEVICE — SUT 4-0 ETHILON 18 PS-2

## (undated) DEVICE — STAPLER ECHELON FLEX 60MM 44CM

## (undated) DEVICE — NDL HYPO REG 25G X 1 1/2

## (undated) DEVICE — SEE MEDLINE ITEM 157117

## (undated) DEVICE — CANNULA ENDOPATH XCEL 5X100MM

## (undated) DEVICE — COVER OVERHEAD SURG LT BLUE

## (undated) DEVICE — PAD ABDOMINAL 5X9 STERILE

## (undated) DEVICE — TROCAR ENDOPATH XCEL 12X100MM

## (undated) DEVICE — MANIFOLD 4 PORT

## (undated) DEVICE — TROCAR ENDOPATH XCEL 5X100MM

## (undated) DEVICE — RELOAD ECHELON FLEX BLU 60MM

## (undated) DEVICE — SEE MEDLINE ITEM 156902

## (undated) DEVICE — SYR 10CC LUER LOCK

## (undated) DEVICE — PACK BASIC

## (undated) DEVICE — SUT 0 VICRYL / UR6 (J603)

## (undated) DEVICE — SYR B-D DISP CONTROL 10CC100/C

## (undated) DEVICE — TROCAR ENDOPATH XCEL 12MM 10CM

## (undated) DEVICE — DRESSING XEROFORM FOIL PK 1X8

## (undated) DEVICE — RELOAD ECHELON FLEX GRN 60MM

## (undated) DEVICE — SEE MEDLINE ITEM 152487

## (undated) DEVICE — NDL 22GA X1 1/2 REG BEVEL

## (undated) DEVICE — SUT ETHILON 3-0 PS2 18 BLK

## (undated) DEVICE — TUBING HF INSUFFLATION W/ FLTR

## (undated) DEVICE — DRESSING XEROFORM GAUZE 5X9

## (undated) DEVICE — DRESSING TRANS 4X4 3/4

## (undated) DEVICE — SHEARS HARMONIC 5CM 36CM

## (undated) DEVICE — SOL NS 1000CC

## (undated) DEVICE — GLOVE BIOGEL ECLIPSE SZ 7.5